# Patient Record
Sex: MALE | Race: WHITE | Employment: UNEMPLOYED | URBAN - METROPOLITAN AREA
[De-identification: names, ages, dates, MRNs, and addresses within clinical notes are randomized per-mention and may not be internally consistent; named-entity substitution may affect disease eponyms.]

---

## 2019-09-11 RX ORDER — ESCITALOPRAM OXALATE 10 MG/1
10 TABLET ORAL EVERY MORNING
COMMUNITY

## 2019-09-11 RX ORDER — LISINOPRIL AND HYDROCHLOROTHIAZIDE 12.5; 2 MG/1; MG/1
1 TABLET ORAL 2 TIMES DAILY
COMMUNITY

## 2019-09-11 RX ORDER — PROCHLORPERAZINE MALEATE 10 MG
10 TABLET ORAL EVERY 6 HOURS PRN
Status: ON HOLD | COMMUNITY
End: 2019-09-12

## 2019-09-11 RX ORDER — ONDANSETRON 8 MG/1
8 TABLET, ORALLY DISINTEGRATING ORAL EVERY 8 HOURS PRN
COMMUNITY

## 2019-09-11 RX ORDER — HYDROXYCHLOROQUINE SULFATE 200 MG/1
200 TABLET, FILM COATED ORAL 2 TIMES DAILY
Status: ON HOLD | COMMUNITY
End: 2019-09-12

## 2019-09-11 RX ORDER — BUPROPION HYDROCHLORIDE 75 MG/1
75 TABLET ORAL 2 TIMES DAILY
COMMUNITY

## 2019-09-11 RX ORDER — LISINOPRIL 20 MG/1
20 TABLET ORAL DAILY
Status: ON HOLD | COMMUNITY
End: 2019-09-12

## 2019-09-11 RX ORDER — PREGABALIN 200 MG/1
200 CAPSULE ORAL 3 TIMES DAILY
Status: ON HOLD | COMMUNITY
End: 2019-09-12

## 2019-09-11 RX ORDER — ALBUTEROL SULFATE 90 UG/1
2 AEROSOL, METERED RESPIRATORY (INHALATION) 4 TIMES DAILY PRN
COMMUNITY

## 2019-09-12 ENCOUNTER — ANESTHESIA EVENT (OUTPATIENT)
Dept: SURGERY | Facility: CLINIC | Age: 49
DRG: 040 | End: 2019-09-12
Payer: OTHER MISCELLANEOUS

## 2019-09-12 ENCOUNTER — TRANSFERRED RECORDS (OUTPATIENT)
Dept: HEALTH INFORMATION MANAGEMENT | Facility: CLINIC | Age: 49
End: 2019-09-12

## 2019-09-12 ENCOUNTER — HOSPITAL ENCOUNTER (INPATIENT)
Facility: CLINIC | Age: 49
LOS: 14 days | Discharge: HOME HEALTH | DRG: 040 | End: 2019-09-26
Attending: ORTHOPAEDIC SURGERY | Admitting: ORTHOPAEDIC SURGERY
Payer: OTHER MISCELLANEOUS

## 2019-09-12 ENCOUNTER — ANESTHESIA (OUTPATIENT)
Dept: SURGERY | Facility: CLINIC | Age: 49
DRG: 040 | End: 2019-09-12
Payer: OTHER MISCELLANEOUS

## 2019-09-12 ENCOUNTER — APPOINTMENT (OUTPATIENT)
Dept: GENERAL RADIOLOGY | Facility: CLINIC | Age: 49
DRG: 040 | End: 2019-09-12
Attending: ORTHOPAEDIC SURGERY
Payer: OTHER MISCELLANEOUS

## 2019-09-12 DIAGNOSIS — D86.9 SARCOIDOSIS: ICD-10-CM

## 2019-09-12 DIAGNOSIS — B99.9 INFECTION: ICD-10-CM

## 2019-09-12 LAB
CREAT SERPL-MCNC: 0.96 MG/DL (ref 0.66–1.25)
GFR SERPL CREATININE-BSD FRML MDRD: >90 ML/MIN/{1.73_M2}
HGB BLD-MCNC: 15 G/DL (ref 13.3–17.7)
POTASSIUM SERPL-SCNC: 3.9 MMOL/L (ref 3.4–5.3)

## 2019-09-12 PROCEDURE — 25000128 H RX IP 250 OP 636: Performed by: ORTHOPAEDIC SURGERY

## 2019-09-12 PROCEDURE — 36000056 ZZH SURGERY LEVEL 3 1ST 30 MIN: Performed by: ORTHOPAEDIC SURGERY

## 2019-09-12 PROCEDURE — 71000012 ZZH RECOVERY PHASE 1 LEVEL 1 FIRST HR: Performed by: ORTHOPAEDIC SURGERY

## 2019-09-12 PROCEDURE — 36415 COLL VENOUS BLD VENIPUNCTURE: CPT | Performed by: ANESTHESIOLOGY

## 2019-09-12 PROCEDURE — 25000125 ZZHC RX 250: Performed by: NURSE ANESTHETIST, CERTIFIED REGISTERED

## 2019-09-12 PROCEDURE — 85018 HEMOGLOBIN: CPT | Performed by: ANESTHESIOLOGY

## 2019-09-12 PROCEDURE — 88307 TISSUE EXAM BY PATHOLOGIST: CPT | Mod: 26 | Performed by: ORTHOPAEDIC SURGERY

## 2019-09-12 PROCEDURE — 25000128 H RX IP 250 OP 636: Performed by: ANESTHESIOLOGY

## 2019-09-12 PROCEDURE — 25800030 ZZH RX IP 258 OP 636: Performed by: PHYSICIAN ASSISTANT

## 2019-09-12 PROCEDURE — 25800030 ZZH RX IP 258 OP 636: Performed by: NURSE ANESTHETIST, CERTIFIED REGISTERED

## 2019-09-12 PROCEDURE — 27210794 ZZH OR GENERAL SUPPLY STERILE: Performed by: ORTHOPAEDIC SURGERY

## 2019-09-12 PROCEDURE — 25000566 ZZH SEVOFLURANE, EA 15 MIN: Performed by: ORTHOPAEDIC SURGERY

## 2019-09-12 PROCEDURE — 37000009 ZZH ANESTHESIA TECHNICAL FEE, EACH ADDTL 15 MIN: Performed by: ORTHOPAEDIC SURGERY

## 2019-09-12 PROCEDURE — 25000125 ZZHC RX 250: Performed by: ORTHOPAEDIC SURGERY

## 2019-09-12 PROCEDURE — 0Y6J0Z3 DETACHMENT AT LEFT LOWER LEG, LOW, OPEN APPROACH: ICD-10-PCS | Performed by: ORTHOPAEDIC SURGERY

## 2019-09-12 PROCEDURE — 25000128 H RX IP 250 OP 636: Performed by: NURSE ANESTHETIST, CERTIFIED REGISTERED

## 2019-09-12 PROCEDURE — 37000008 ZZH ANESTHESIA TECHNICAL FEE, 1ST 30 MIN: Performed by: ORTHOPAEDIC SURGERY

## 2019-09-12 PROCEDURE — 12000000 ZZH R&B MED SURG/OB

## 2019-09-12 PROCEDURE — 84132 ASSAY OF SERUM POTASSIUM: CPT | Performed by: ANESTHESIOLOGY

## 2019-09-12 PROCEDURE — 25000128 H RX IP 250 OP 636: Performed by: PHYSICIAN ASSISTANT

## 2019-09-12 PROCEDURE — 40000170 ZZH STATISTIC PRE-PROCEDURE ASSESSMENT II: Performed by: ORTHOPAEDIC SURGERY

## 2019-09-12 PROCEDURE — 25800030 ZZH RX IP 258 OP 636: Performed by: ANESTHESIOLOGY

## 2019-09-12 PROCEDURE — 82565 ASSAY OF CREATININE: CPT | Performed by: ANESTHESIOLOGY

## 2019-09-12 PROCEDURE — 25000132 ZZH RX MED GY IP 250 OP 250 PS 637: Performed by: ANESTHESIOLOGY

## 2019-09-12 PROCEDURE — 88307 TISSUE EXAM BY PATHOLOGIST: CPT | Performed by: ORTHOPAEDIC SURGERY

## 2019-09-12 PROCEDURE — 36000058 ZZH SURGERY LEVEL 3 EA 15 ADDTL MIN: Performed by: ORTHOPAEDIC SURGERY

## 2019-09-12 PROCEDURE — 25000132 ZZH RX MED GY IP 250 OP 250 PS 637: Performed by: PHYSICIAN ASSISTANT

## 2019-09-12 PROCEDURE — 40000985 XR KNEE PORT LT 1/2 VW: Mod: LT

## 2019-09-12 RX ORDER — FENTANYL CITRATE 50 UG/ML
50-100 INJECTION, SOLUTION INTRAMUSCULAR; INTRAVENOUS
Status: DISCONTINUED | OUTPATIENT
Start: 2019-09-12 | End: 2019-09-12 | Stop reason: HOSPADM

## 2019-09-12 RX ORDER — PRAVASTATIN SODIUM 10 MG
10 TABLET ORAL EVERY EVENING
Status: DISCONTINUED | OUTPATIENT
Start: 2019-09-12 | End: 2019-09-16

## 2019-09-12 RX ORDER — HYDROMORPHONE HYDROCHLORIDE 1 MG/ML
.3-.5 INJECTION, SOLUTION INTRAMUSCULAR; INTRAVENOUS; SUBCUTANEOUS
Status: DISCONTINUED | OUTPATIENT
Start: 2019-09-12 | End: 2019-09-26 | Stop reason: HOSPADM

## 2019-09-12 RX ORDER — CEFAZOLIN SODIUM 1 G/3ML
1 INJECTION, POWDER, FOR SOLUTION INTRAMUSCULAR; INTRAVENOUS EVERY 8 HOURS
Status: DISCONTINUED | OUTPATIENT
Start: 2019-09-12 | End: 2019-09-12

## 2019-09-12 RX ORDER — ESCITALOPRAM OXALATE 10 MG/1
10 TABLET ORAL EVERY MORNING
Status: DISCONTINUED | OUTPATIENT
Start: 2019-09-13 | End: 2019-09-16

## 2019-09-12 RX ORDER — HYDROXYZINE HYDROCHLORIDE 25 MG/1
25 TABLET, FILM COATED ORAL EVERY 6 HOURS PRN
Status: DISCONTINUED | OUTPATIENT
Start: 2019-09-12 | End: 2019-09-13

## 2019-09-12 RX ORDER — PROCHLORPERAZINE MALEATE 10 MG
10 TABLET ORAL EVERY 6 HOURS PRN
Status: DISCONTINUED | OUTPATIENT
Start: 2019-09-12 | End: 2019-09-26 | Stop reason: HOSPADM

## 2019-09-12 RX ORDER — LIDOCAINE 40 MG/G
CREAM TOPICAL
Status: DISCONTINUED | OUTPATIENT
Start: 2019-09-12 | End: 2019-09-17

## 2019-09-12 RX ORDER — FENTANYL CITRATE 50 UG/ML
25-50 INJECTION, SOLUTION INTRAMUSCULAR; INTRAVENOUS
Status: DISCONTINUED | OUTPATIENT
Start: 2019-09-12 | End: 2019-09-12 | Stop reason: HOSPADM

## 2019-09-12 RX ORDER — NALOXONE HYDROCHLORIDE 0.4 MG/ML
.1-.4 INJECTION, SOLUTION INTRAMUSCULAR; INTRAVENOUS; SUBCUTANEOUS
Status: DISCONTINUED | OUTPATIENT
Start: 2019-09-12 | End: 2019-09-16

## 2019-09-12 RX ORDER — ACETAMINOPHEN 325 MG/1
650 TABLET ORAL EVERY 4 HOURS PRN
Status: DISCONTINUED | OUTPATIENT
Start: 2019-09-15 | End: 2019-09-16

## 2019-09-12 RX ORDER — BUPROPION HYDROCHLORIDE 75 MG/1
75 TABLET ORAL 2 TIMES DAILY
Status: DISCONTINUED | OUTPATIENT
Start: 2019-09-12 | End: 2019-09-16

## 2019-09-12 RX ORDER — ONDANSETRON 2 MG/ML
INJECTION INTRAMUSCULAR; INTRAVENOUS PRN
Status: DISCONTINUED | OUTPATIENT
Start: 2019-09-12 | End: 2019-09-12

## 2019-09-12 RX ORDER — CEFAZOLIN SODIUM 2 G/100ML
2 INJECTION, SOLUTION INTRAVENOUS EVERY 8 HOURS
Status: COMPLETED | OUTPATIENT
Start: 2019-09-12 | End: 2019-09-13

## 2019-09-12 RX ORDER — ALBUTEROL SULFATE 90 UG/1
2 AEROSOL, METERED RESPIRATORY (INHALATION) 4 TIMES DAILY PRN
Status: DISCONTINUED | OUTPATIENT
Start: 2019-09-12 | End: 2019-09-26 | Stop reason: HOSPADM

## 2019-09-12 RX ORDER — METOCLOPRAMIDE HYDROCHLORIDE 5 MG/ML
10 INJECTION INTRAMUSCULAR; INTRAVENOUS EVERY 6 HOURS PRN
Status: DISCONTINUED | OUTPATIENT
Start: 2019-09-12 | End: 2019-09-26 | Stop reason: HOSPADM

## 2019-09-12 RX ORDER — KETAMINE HYDROCHLORIDE 10 MG/ML
INJECTION, SOLUTION INTRAMUSCULAR; INTRAVENOUS PRN
Status: DISCONTINUED | OUTPATIENT
Start: 2019-09-12 | End: 2019-09-12

## 2019-09-12 RX ORDER — ONDANSETRON 2 MG/ML
4 INJECTION INTRAMUSCULAR; INTRAVENOUS EVERY 6 HOURS PRN
Status: DISCONTINUED | OUTPATIENT
Start: 2019-09-12 | End: 2019-09-26 | Stop reason: HOSPADM

## 2019-09-12 RX ORDER — DEXAMETHASONE SODIUM PHOSPHATE 4 MG/ML
INJECTION, SOLUTION INTRA-ARTICULAR; INTRALESIONAL; INTRAMUSCULAR; INTRAVENOUS; SOFT TISSUE PRN
Status: DISCONTINUED | OUTPATIENT
Start: 2019-09-12 | End: 2019-09-12

## 2019-09-12 RX ORDER — OXYCODONE HYDROCHLORIDE 5 MG/1
5-10 TABLET ORAL
Status: DISCONTINUED | OUTPATIENT
Start: 2019-09-12 | End: 2019-09-13

## 2019-09-12 RX ORDER — AMOXICILLIN 250 MG
2 CAPSULE ORAL 2 TIMES DAILY
Status: DISCONTINUED | OUTPATIENT
Start: 2019-09-12 | End: 2019-09-16

## 2019-09-12 RX ORDER — ACETAMINOPHEN 325 MG/1
975 TABLET ORAL EVERY 8 HOURS
Status: COMPLETED | OUTPATIENT
Start: 2019-09-12 | End: 2019-09-15

## 2019-09-12 RX ORDER — ASPIRIN 325 MG
325 TABLET ORAL DAILY
Status: DISCONTINUED | OUTPATIENT
Start: 2019-09-13 | End: 2019-09-16

## 2019-09-12 RX ORDER — PROPOFOL 10 MG/ML
INJECTION, EMULSION INTRAVENOUS PRN
Status: DISCONTINUED | OUTPATIENT
Start: 2019-09-12 | End: 2019-09-12

## 2019-09-12 RX ORDER — LISINOPRIL AND HYDROCHLOROTHIAZIDE 12.5; 2 MG/1; MG/1
1 TABLET ORAL 2 TIMES DAILY
Status: DISCONTINUED | OUTPATIENT
Start: 2019-09-12 | End: 2019-09-16

## 2019-09-12 RX ORDER — NALOXONE HYDROCHLORIDE 0.4 MG/ML
.1-.4 INJECTION, SOLUTION INTRAMUSCULAR; INTRAVENOUS; SUBCUTANEOUS
Status: DISCONTINUED | OUTPATIENT
Start: 2019-09-12 | End: 2019-09-12

## 2019-09-12 RX ORDER — METOCLOPRAMIDE 5 MG/1
10 TABLET ORAL EVERY 6 HOURS PRN
Status: DISCONTINUED | OUTPATIENT
Start: 2019-09-12 | End: 2019-09-26 | Stop reason: HOSPADM

## 2019-09-12 RX ORDER — HYDROMORPHONE HYDROCHLORIDE 1 MG/ML
.3-.5 INJECTION, SOLUTION INTRAMUSCULAR; INTRAVENOUS; SUBCUTANEOUS EVERY 5 MIN PRN
Status: DISCONTINUED | OUTPATIENT
Start: 2019-09-12 | End: 2019-09-12 | Stop reason: HOSPADM

## 2019-09-12 RX ORDER — ASCORBIC ACID 500 MG
500 TABLET ORAL 2 TIMES DAILY
Status: DISCONTINUED | OUTPATIENT
Start: 2019-09-12 | End: 2019-09-16

## 2019-09-12 RX ORDER — ONDANSETRON 4 MG/1
4 TABLET, ORALLY DISINTEGRATING ORAL EVERY 30 MIN PRN
Status: DISCONTINUED | OUTPATIENT
Start: 2019-09-12 | End: 2019-09-12 | Stop reason: HOSPADM

## 2019-09-12 RX ORDER — CEFAZOLIN SODIUM IN 0.9 % NACL 3 G/100 ML
INTRAVENOUS SOLUTION, PIGGYBACK (ML) INTRAVENOUS PRN
Status: DISCONTINUED | OUTPATIENT
Start: 2019-09-12 | End: 2019-09-12

## 2019-09-12 RX ORDER — ONDANSETRON 2 MG/ML
4 INJECTION INTRAMUSCULAR; INTRAVENOUS EVERY 30 MIN PRN
Status: DISCONTINUED | OUTPATIENT
Start: 2019-09-12 | End: 2019-09-12 | Stop reason: HOSPADM

## 2019-09-12 RX ORDER — BUPIVACAINE HYDROCHLORIDE 5 MG/ML
INJECTION, SOLUTION PERINEURAL PRN
Status: DISCONTINUED | OUTPATIENT
Start: 2019-09-12 | End: 2019-09-12 | Stop reason: HOSPADM

## 2019-09-12 RX ORDER — GABAPENTIN 300 MG/1
300 CAPSULE ORAL 3 TIMES DAILY
Status: COMPLETED | OUTPATIENT
Start: 2019-09-12 | End: 2019-09-15

## 2019-09-12 RX ORDER — ONDANSETRON 4 MG/1
4 TABLET, ORALLY DISINTEGRATING ORAL EVERY 6 HOURS PRN
Status: DISCONTINUED | OUTPATIENT
Start: 2019-09-12 | End: 2019-09-26 | Stop reason: HOSPADM

## 2019-09-12 RX ORDER — ACETAMINOPHEN 325 MG/1
975 TABLET ORAL ONCE
Status: COMPLETED | OUTPATIENT
Start: 2019-09-12 | End: 2019-09-12

## 2019-09-12 RX ORDER — CEFAZOLIN SODIUM 2 G/100ML
2 INJECTION, SOLUTION INTRAVENOUS
Status: DISCONTINUED | OUTPATIENT
Start: 2019-09-12 | End: 2019-09-12

## 2019-09-12 RX ORDER — LAMOTRIGINE 100 MG/1
100 TABLET ORAL 2 TIMES DAILY
Status: DISCONTINUED | OUTPATIENT
Start: 2019-09-12 | End: 2019-09-16

## 2019-09-12 RX ORDER — MULTIVIT WITH MINERALS/LUTEIN
1000 TABLET ORAL DAILY
COMMUNITY

## 2019-09-12 RX ORDER — KETOROLAC TROMETHAMINE 30 MG/ML
30 INJECTION, SOLUTION INTRAMUSCULAR; INTRAVENOUS EVERY 6 HOURS PRN
Status: DISCONTINUED | OUTPATIENT
Start: 2019-09-12 | End: 2019-09-16

## 2019-09-12 RX ORDER — CEFAZOLIN SODIUM IN 0.9 % NACL 3 G/100 ML
3 INTRAVENOUS SOLUTION, PIGGYBACK (ML) INTRAVENOUS
Status: COMPLETED | OUTPATIENT
Start: 2019-09-12 | End: 2019-09-12

## 2019-09-12 RX ORDER — SODIUM CHLORIDE, SODIUM LACTATE, POTASSIUM CHLORIDE, CALCIUM CHLORIDE 600; 310; 30; 20 MG/100ML; MG/100ML; MG/100ML; MG/100ML
INJECTION, SOLUTION INTRAVENOUS CONTINUOUS
Status: DISCONTINUED | OUTPATIENT
Start: 2019-09-12 | End: 2019-09-12 | Stop reason: HOSPADM

## 2019-09-12 RX ORDER — SODIUM CHLORIDE 9 MG/ML
INJECTION, SOLUTION INTRAVENOUS CONTINUOUS
Status: DISCONTINUED | OUTPATIENT
Start: 2019-09-12 | End: 2019-09-16

## 2019-09-12 RX ORDER — AMOXICILLIN 250 MG
1 CAPSULE ORAL 2 TIMES DAILY
Status: DISCONTINUED | OUTPATIENT
Start: 2019-09-12 | End: 2019-09-16

## 2019-09-12 RX ORDER — SUMATRIPTAN 20 MG/1
1 SPRAY NASAL
Status: DISCONTINUED | OUTPATIENT
Start: 2019-09-12 | End: 2019-09-16

## 2019-09-12 RX ORDER — MAGNESIUM HYDROXIDE 1200 MG/15ML
LIQUID ORAL PRN
Status: DISCONTINUED | OUTPATIENT
Start: 2019-09-12 | End: 2019-09-12 | Stop reason: HOSPADM

## 2019-09-12 RX ORDER — CEFAZOLIN SODIUM 1 G/3ML
1 INJECTION, POWDER, FOR SOLUTION INTRAMUSCULAR; INTRAVENOUS SEE ADMIN INSTRUCTIONS
Status: DISCONTINUED | OUTPATIENT
Start: 2019-09-12 | End: 2019-09-12 | Stop reason: HOSPADM

## 2019-09-12 RX ADMIN — PHENYLEPHRINE HYDROCHLORIDE 100 MCG: 10 INJECTION INTRAVENOUS at 12:31

## 2019-09-12 RX ADMIN — MIDAZOLAM 2 MG: 1 INJECTION INTRAMUSCULAR; INTRAVENOUS at 11:40

## 2019-09-12 RX ADMIN — Medication 3 G: at 11:51

## 2019-09-12 RX ADMIN — HYDROMORPHONE HYDROCHLORIDE 0.5 MG: 1 INJECTION, SOLUTION INTRAMUSCULAR; INTRAVENOUS; SUBCUTANEOUS at 22:34

## 2019-09-12 RX ADMIN — PHENYLEPHRINE HYDROCHLORIDE 200 MCG: 10 INJECTION INTRAVENOUS at 12:45

## 2019-09-12 RX ADMIN — GABAPENTIN 300 MG: 300 CAPSULE ORAL at 16:04

## 2019-09-12 RX ADMIN — Medication 3 G: at 11:24

## 2019-09-12 RX ADMIN — HYDROXYZINE HYDROCHLORIDE 25 MG: 25 TABLET ORAL at 16:04

## 2019-09-12 RX ADMIN — SODIUM CHLORIDE: 9 INJECTION, SOLUTION INTRAVENOUS at 16:04

## 2019-09-12 RX ADMIN — OXYCODONE HYDROCHLORIDE 5 MG: 5 TABLET ORAL at 17:07

## 2019-09-12 RX ADMIN — ACETAMINOPHEN 975 MG: 325 TABLET, FILM COATED ORAL at 10:55

## 2019-09-12 RX ADMIN — DEXMEDETOMIDINE HYDROCHLORIDE 20 MCG: 100 INJECTION, SOLUTION INTRAVENOUS at 11:59

## 2019-09-12 RX ADMIN — PROPOFOL 100 MG: 10 INJECTION, EMULSION INTRAVENOUS at 11:46

## 2019-09-12 RX ADMIN — MILNACIPRAN HYDROCHLORIDE 50 MG: 50 TABLET, FILM COATED ORAL at 20:34

## 2019-09-12 RX ADMIN — PRAVASTATIN SODIUM 10 MG: 10 TABLET ORAL at 19:34

## 2019-09-12 RX ADMIN — LAMOTRIGINE 100 MG: 100 TABLET ORAL at 20:34

## 2019-09-12 RX ADMIN — OXYCODONE HYDROCHLORIDE 5 MG: 5 TABLET ORAL at 18:10

## 2019-09-12 RX ADMIN — MIDAZOLAM HYDROCHLORIDE 4 MG: 1 INJECTION, SOLUTION INTRAMUSCULAR; INTRAVENOUS at 10:55

## 2019-09-12 RX ADMIN — HYDROXYZINE HYDROCHLORIDE 25 MG: 25 TABLET ORAL at 22:34

## 2019-09-12 RX ADMIN — DEXMEDETOMIDINE HYDROCHLORIDE 20 MCG: 100 INJECTION, SOLUTION INTRAVENOUS at 11:52

## 2019-09-12 RX ADMIN — CEFAZOLIN SODIUM 2 G: 2 INJECTION, SOLUTION INTRAVENOUS at 21:22

## 2019-09-12 RX ADMIN — GABAPENTIN 300 MG: 300 CAPSULE ORAL at 21:21

## 2019-09-12 RX ADMIN — PHENYLEPHRINE HYDROCHLORIDE 200 MCG: 10 INJECTION INTRAVENOUS at 12:42

## 2019-09-12 RX ADMIN — SENNOSIDES AND DOCUSATE SODIUM 2 TABLET: 8.6; 5 TABLET ORAL at 20:34

## 2019-09-12 RX ADMIN — PHENYLEPHRINE HYDROCHLORIDE 200 MCG: 10 INJECTION INTRAVENOUS at 13:00

## 2019-09-12 RX ADMIN — LISINOPRIL AND HYDROCHLOROTHIAZIDE 1 TABLET: 12.5; 2 TABLET ORAL at 20:34

## 2019-09-12 RX ADMIN — PROPOFOL 200 MG: 10 INJECTION, EMULSION INTRAVENOUS at 11:43

## 2019-09-12 RX ADMIN — SODIUM CHLORIDE, POTASSIUM CHLORIDE, SODIUM LACTATE AND CALCIUM CHLORIDE: 600; 310; 30; 20 INJECTION, SOLUTION INTRAVENOUS at 11:23

## 2019-09-12 RX ADMIN — HYDROMORPHONE HYDROCHLORIDE 0.5 MG: 1 INJECTION, SOLUTION INTRAMUSCULAR; INTRAVENOUS; SUBCUTANEOUS at 19:34

## 2019-09-12 RX ADMIN — ONDANSETRON 4 MG: 2 INJECTION INTRAMUSCULAR; INTRAVENOUS at 13:10

## 2019-09-12 RX ADMIN — ACETAMINOPHEN 975 MG: 325 TABLET ORAL at 18:11

## 2019-09-12 RX ADMIN — SODIUM CHLORIDE, POTASSIUM CHLORIDE, SODIUM LACTATE AND CALCIUM CHLORIDE: 600; 310; 30; 20 INJECTION, SOLUTION INTRAVENOUS at 12:52

## 2019-09-12 RX ADMIN — OXYCODONE HYDROCHLORIDE AND ACETAMINOPHEN 500 MG: 500 TABLET ORAL at 20:34

## 2019-09-12 RX ADMIN — OXYCODONE HYDROCHLORIDE 10 MG: 5 TABLET ORAL at 20:34

## 2019-09-12 RX ADMIN — Medication 30 MG: at 11:43

## 2019-09-12 RX ADMIN — DEXAMETHASONE SODIUM PHOSPHATE 8 MG: 4 INJECTION, SOLUTION INTRA-ARTICULAR; INTRALESIONAL; INTRAMUSCULAR; INTRAVENOUS; SOFT TISSUE at 11:52

## 2019-09-12 RX ADMIN — Medication 20 MG: at 12:49

## 2019-09-12 RX ADMIN — PHENYLEPHRINE HYDROCHLORIDE 100 MCG: 10 INJECTION INTRAVENOUS at 12:33

## 2019-09-12 RX ADMIN — BUPROPION HYDROCHLORIDE 75 MG: 75 TABLET, FILM COATED ORAL at 20:34

## 2019-09-12 ASSESSMENT — ACTIVITIES OF DAILY LIVING (ADL)
ADLS_ACUITY_SCORE: 15
ADLS_ACUITY_SCORE: 15

## 2019-09-12 ASSESSMENT — MIFFLIN-ST. JEOR: SCORE: 2138.18

## 2019-09-12 NOTE — ANESTHESIA PROCEDURE NOTES
Peripheral nerve/Neuraxial procedure note : Adductor canal (targeting saphenous nerve)  Pre-Procedure  Performed by Cali Avendaño MD  Location: pre-op      Pre-Anesthestic Checklist: patient identified, IV checked, site marked, risks and benefits discussed, informed consent, monitors and equipment checked, pre-op evaluation, at physician/surgeon's request and post-op pain management    Timeout  Correct Patient: Yes   Correct Procedure: Yes   Correct Site: Yes   Correct Laterality: Yes   Correct Position: Yes   Site Marked: Yes   .   Procedure Documentation    .    Procedure:  left  Adductor canal (targeting saphenous nerve).  Local skin infiltrated with 1 mL of 1% lidocaine.     Ultrasound used to identify targeted nerve, plexus, or vascular marker and placed a needle adjacent to it., Ultrasound was used to visualize the spread of the anesthetic in close proximity to the above stated nerve. A permanent image is entered into the patient's record.  Patient Prep;chlorhexidine gluconate and isopropyl alcohol.  .  Needle: insulated Needle Gauge: 21.  Needle Length (millimeters) 100  Insertion Method: Single Shot.       Assessment/Narrative  Paresthesias: No.  .  The placement was negative for: blood aspirated, painful injection and site bleeding.  Bolus given via needle..   Secured via.   Complications: none. Comments:  Bolus via needle, 15 ml of 0.5% bupivacaine with 1:400,000 epinephrine.  Patient tolerated well, was mildly sedated but communicative throughout the procedure.   The surgeon has given a verbal order transferring care of this patient to me for the performance of regional analgesia block for post op pain control. It is requested of me because I am uniquely trained and qualified to perform this block and the surgeon is neither trained nor qualified to perform this procedure.

## 2019-09-12 NOTE — ANESTHESIA PREPROCEDURE EVALUATION
Anesthesia Pre-Procedure Evaluation    Patient: Juancho Mohan   MRN: [id number] : 1970          Preoperative Diagnosis: left chronic foot and ankle pain, complex regional pain syndrome    Procedure(s):  LEFT BELOW KNEE AMPUTATION    Past Medical History:   Diagnosis Date     Alcohol abuse, episodic      Attention deficit disorder with hyperactivity(314.01)      Congenital spondylolisthesis     L5-S1     Depressive disorder, not elsewhere classified      Dyspnea on exertion      Essential hypertension, benign      Gastro-oesophageal reflux disease      Generalized anxiety disorder      Migraine, unspecified, with intractable migraine, so stated, without mention of status migrainosus 05    acts like a stroke, hospitalized, saw neurology     Pneumonia, organism [phone] 08    Admit. Discharged 08-[facility]     Reflux esophagitis      Renal disease     kidney stone     RSD (reflex sympathetic dystrophy)      Sarcoidoses 12    EBUS- FNA. Unable to do special stains     Sleep apnea 2009    no c pap usage     Past Surgical History:   Procedure Laterality Date     ARTHRODESIS ANKLE  2013    Procedure: ARTHRODESIS ANKLE;  LEFT SAPHENOUS NERVE BLOCK, LEFT REVISION SUBTALAR FUSION, POSTERIOR BONE BLOCK, TENDOACHILLES LENGTHENING, PLANTAR CALCANEAL EXCOSTECTOMY, LEFT SURAL NEURECTOMY  (FEMORAL HEAD ALLOGRAFT, ALLOSTEM STRIPS, SYNTHES 6.5 MM HEADLESS COMPRESSION SCREWS) ;  Surgeon: Amauri Mccoy MD;  Location:  OR     ARTHROSCOPY ANKLE Left 7/15/2015    Procedure: ARTHROSCOPY ANKLE;  Surgeon: Amauri Mccoy MD;  Location:  OR     BRONCHOSCOPY FLEXIBLE  2012    Procedure: BRONCHOSCOPY FLEXIBLE;  Flexible Bronchoscopy, Endobronchial Ultrasound with Biopsies;  Surgeon: Justice Dos Santos MD;  Location: [facility] OR     C LUMBAR SPINE FUSION,ANTER APPRCH       C NONSPECIFIC PROCEDURE      Multiple ortho injuries after 38 foot fall -  Fx x 3 right foot, Tibial plateau fx - with ORIF; Left heel fx - plate/screws.     EXCISE EXOSTOSIS FOOT  12/31/2013    Procedure: EXCISE EXOSTOSIS FOOT;  PLANTAR CALCANEOUS EXOSTECOMY;  Surgeon: Amauri Mccoy MD;  Location:  OR     EXERCISE STRESS TEST NL  Jan 2010    normal     GRAFT BONE FROM ILIAC CREST  12/31/2013    Procedure: GRAFT BONE FROM ILIAC CREST;  BONE MARROW  ASPIRATION FROM RIGHTILIAC CREST;  Surgeon: Amauri Mccoy MD;  Location: [facility] OR     HC ARTHROTOMY/EXPLORE/TREAT KNEE JOINT      x3 left; x1 right     HC REMOVAL DEEP IMPLANT  6/16/2009    Left foot. Open wedge osteotomy through the calcaneus. Fusion of left calcaneocuboid joint.     HC REPAIR OF NASAL SEPTUM  12/02/08    [facility]     IRRIGATION AND DEBRIDEMENT FOOT, COMBINED Left 7/15/2015    Procedure: COMBINED IRRIGATION AND DEBRIDEMENT FOOT;  Surgeon: Amauri Mccoy MD;  Location:  OR     IRRIGATION AND DEBRIDEMENT LOWER EXTREMITY, COMBINED Left 7/10/2015    Procedure: COMBINED IRRIGATION AND DEBRIDEMENT LOWER EXTREMITY;  Surgeon: Kirt Godwin MD;  Location:  OR     LENGTHEN TENDON ACHILLES  12/31/2013    Procedure: LENGTHEN TENDON ACHILLES;;  Surgeon: Amauri Mccoy MD;  Location:  OR       Anesthesia Evaluation     .             ROS/MED HX    ENT/Pulmonary:     (+)sleep apnea, , . .    Neurologic:     (+)migraines,     Cardiovascular:     (+) hypertension----. : . . . :. .       METS/Exercise Tolerance:     Hematologic:         Musculoskeletal:         GI/Hepatic:     (+) GERD       Renal/Genitourinary:     (+) chronic renal disease,       Endo:     (+) Obesity, .      Psychiatric:     (+) psychiatric history anxiety and depression      Infectious Disease:         Malignancy:         Other:    (+) H/O Chronic Pain,H/O chronic opiod use ,                         Physical Exam      Airway   Mallampati: II  TM distance: >3 FB  Neck ROM: full    Dental     Cardiovascular        Pulmonary             Lab Results   Component Value Date    WBC 7.5 08/11/2015    HGB 15.0 09/12/2019    HCT 37.5 (L) 08/11/2015     08/11/2015    CRP 69.0 (H) 08/10/2015    SED 49 (H) 08/10/2015     08/05/2015    POTASSIUM 3.9 08/05/2015    CHLORIDE 103 08/05/2015    CO2 25 08/05/2015    BUN 14 08/10/2015    CR 1.15 08/10/2015    GLC 91 08/05/2015    QUE 9.3 08/05/2015    PHOS 4.4 01/01/2014    MAG 2.0 01/01/2014    ALBUMIN 3.0 (L) 08/01/2015    PROTTOTAL 8.0 08/01/2015    ALT 23 08/01/2015    AST 19 08/10/2015    GGT 32 12/19/2002    ALKPHOS 235 (H) 08/01/2015    BILITOTAL 0.3 08/01/2015    BILIDIRECT 0.1 12/20/2002    LIPASE 76 05/18/2012    AMYLASE 49 01/03/2010    INR 0.99 06/06/2012    TSH 3.36 06/06/2008    T4 0.78 06/06/2008       Preop Vitals  BP Readings from Last 3 Encounters:   08/11/15 142/84   08/01/15 (!) 177/99   07/18/15 124/88    Pulse Readings from Last 3 Encounters:   08/01/15 84   07/17/15 102   10/12/12 65      Resp Readings from Last 3 Encounters:   08/01/15 20   07/18/15 16   01/02/14 16    SpO2 Readings from Last 3 Encounters:   08/11/15 100%   08/01/15 98%   07/18/15 95%      Temp Readings from Last 1 Encounters:   08/01/15 37.6  C (99.6  F) (Oral)    Ht Readings from Last 1 Encounters:   09/12/19 1.829 m (6')      Wt Readings from Last 1 Encounters:   09/12/19 122 kg (269 lb)    Estimated body mass index is 36.48 kg/m  as calculated from the following:    Height as of this encounter: 1.829 m (6').    Weight as of this encounter: 122 kg (269 lb).       Anesthesia Plan      History & Physical Review  History and physical reviewed and following examination; no interval change.    ASA Status:  3 .    NPO Status:  > 8 hours    Plan for General, Periph. Nerve Block for postop pain and LMA with Propofol induction. Maintenance will be Balanced.    PONV prophylaxis:  Ondansetron (or other 5HT-3) and Dexamethasone or Solumedrol       Postoperative Care  Postoperative pain  management:  IV analgesics.      Consents  Anesthetic plan, risks, benefits and alternatives discussed with:  Patient..                 Cali Avendaño MD

## 2019-09-12 NOTE — ANESTHESIA POSTPROCEDURE EVALUATION
Patient: Juancho Mohan    Procedure(s):  LEFT BELOW KNEE AMPUTATION    Diagnosis:left chronic foot and ankle pain, complex regional pain syndrome  Diagnosis Additional Information: No value filed.    Anesthesia Type:  General, Periph. Nerve Block for postop pain, LMA    Note:  Anesthesia Post Evaluation    Patient location during evaluation: PACU  Patient participation: Able to fully participate in evaluation  Level of consciousness: awake and alert  Pain management: adequate  Airway patency: patent  Cardiovascular status: acceptable and hemodynamically stable  Respiratory status: acceptable  Hydration status: euvolemic  PONV: none     Anesthetic complications: None          Last vitals:  Vitals:    09/12/19 1545 09/12/19 1600 09/12/19 1615   BP: 123/85  (!) 135/94   Pulse:      Resp: 15 16 16   Temp:      SpO2: 96%  96%         Electronically Signed By: Cali Avendaño MD  September 12, 2019  4:42 PM

## 2019-09-12 NOTE — ANESTHESIA PROCEDURE NOTES
Peripheral nerve/Neuraxial procedure note : Popliteal  Pre-Procedure  Performed by Cali Avendaño MD  Location: pre-op      Pre-Anesthestic Checklist: patient identified, IV checked, site marked, risks and benefits discussed, informed consent, monitors and equipment checked, pre-op evaluation, at physician/surgeon's request and post-op pain management    Timeout  Correct Patient: Yes   Correct Procedure: Yes   Correct Site: Yes   Correct Laterality: Yes   Correct Position: Yes   Site Marked: Yes   .   Procedure Documentation    .    Procedure:  left  Popliteal.  Local skin infiltrated with 1 mL of 1% lidocaine.     Ultrasound used to identify targeted nerve, plexus, or vascular marker and placed a needle adjacent to it., Ultrasound was used to visualize the spread of the anesthetic in close proximity to the above stated nerve. A permanent image is entered into the patient's record.  Patient Prep;povidone-iodine 7.5% surgical scrub.  .  Needle: insulated Needle Gauge: 21.    Needle Length (Inches) 3.13  Insertion Method: Single Shot.       Assessment/Narrative  Paresthesias: No.  .  The placement was negative for: blood aspirated, painful injection and site bleeding.  Bolus given via needle. No blood aspirated via catheter.   Secured via.   Complications:. Comments:  Bolus via needle, 25 ml of 0.5% bupivacaine with 1:400,000 epinephrine.  Patient tolerated well, was mildly sedated but communicative throughout the procedure.   The surgeon has given a verbal order transferring care of this patient to me for the performance of regional analgesia block for post op pain control. It is requested of me because I am uniquely trained and qualified to perform this block and the surgeon is neither trained nor qualified to perform this procedure.

## 2019-09-12 NOTE — PROGRESS NOTES
Admission medication history interview status for the 9/12/2019  admission is complete. See EPIC admission navigator for prior to admission medications     Medication history source reliability:Moderate    Medication history interview source(s):Patient    Medication history resources (including written lists, pill bottles, clinic record): written list    Primary pharmacy. [facility]    Additional medication history information not noted on PTA med list :None    Time spent in this activity: 45 mins    Prior to Admission medications    Medication Sig Last Dose Taking? Auth Provider   acetaminophen (TYLENOL) 325 MG tablet Take 2 tablets (650 mg) by mouth every 4 hours as needed 8/22/2019 at prn Yes Amauri Mccoy MD   albuterol (PROAIR HFA/PROVENTIL HFA/VENTOLIN HFA) 108 (90 Base) MCG/ACT inhaler Inhale 2 puffs into the lungs 4 times daily as needed for shortness of breath / dyspnea or wheezing 9/10/2019 at prn Yes Reported, Patient   buPROPion (WELLBUTRIN) 75 MG tablet Take 75 mg by mouth 2 times daily 9/11/2019 at 2130 Yes Reported, Patient   escitalopram (LEXAPRO) 10 MG tablet Take 10 mg by mouth every morning 9/11/2019 at 2130 Yes Reported, Patient   fluticasone-vilanterol (BREO ELLIPTA) 100-25 MCG/INH inhaler Inhale 1 puff into the lungs daily 9/12/2019 at 0700 Yes Reported, Patient   IMITREX 20 MG/ACT nasal spray SPRAY 1 SPRAY IN NOSTRIL AS NEEDED FOR MIGRAINE (MAY REPEAT AFTER 2 HRS IF NEEDED, MAX 2 SPRAYS IN 24 HRS.). 8/29/2019 at prn Yes Trish Krishnan MD   lamoTRIgine (LAMICTAL) 200 MG tablet Take 100 mg by mouth 2 times daily  9/11/2019 at 2130 Yes Reported, Patient   lisinopril-hydrochlorothiazide (PRINZIDE/ZESTORETIC) 20-12.5 MG tablet Take 1 tablet by mouth 2 times daily 9/11/2019 at 2130 Yes Reported, Patient   milnacipran (SAVELLA) 50 MG TABS Take 50 mg by mouth 2 times daily. 9/11/2019 at 2130 Yes Reported, Patient   ondansetron (ZOFRAN-ODT) 8 MG ODT tab Take 8 mg by mouth  every 8 hours as needed for nausea 9/5/2019 at prn Yes Reported, Patient   pravastatin (PRAVACHOL) 10 MG tablet Take 10 mg by mouth every evening  9/11/2019 at 2130 Yes Unknown, Entered By History   vitamin C (ASCORBIC ACID) 1000 MG TABS Take 1,000 mg by mouth daily 9/11/2019 at 2130 Yes Reported, Patient   ORDER FOR DME Equipment being ordered: Walker Wheels (), Walker () and Crutches ()  Treatment Diagnosis: Decreased functional mobility   Amauri Mccoy MD   ORDER FOR DME Equipment being ordered: Walker Wheels (), Walker () and Crutches ()  Treatment Diagnosis: decreased functional mobility   Amauri Mccoy MD

## 2019-09-12 NOTE — ANESTHESIA CARE TRANSFER NOTE
Patient: Juancho Mohan    Procedure(s):  LEFT BELOW KNEE AMPUTATION    Diagnosis: left chronic foot and ankle pain, complex regional pain syndrome  Diagnosis Additional Information: No value filed.    Anesthesia Type:   General, Periph. Nerve Block for postop pain, LMA     Note:  Airway :Face Mask  Patient transferred to:PACU  Handoff Report: Identifed the Patient, Identified the Reponsible Provider, Reviewed the pertinent medical history, Discussed the surgical course, Reviewed Intra-OP anesthesia mangement and issues during anesthesia, Set expectations for post-procedure period and Allowed opportunity for questions and acknowledgement of understanding      Vitals: (Last set prior to Anesthesia Care Transfer)    CRNA VITALS  9/12/2019 1304 - 9/12/2019 1340      9/12/2019             SpO2:  94 %    Resp Rate (observed):      Resp Rate (set):  10                Electronically Signed By: JAVIER Brothers CRNA  September 12, 2019  1:40 PM

## 2019-09-12 NOTE — OP NOTE
PREOPERATIVE DIAGNOSIS: L chronic ankle and hindfoot pain, CRPS, s/p prior subtalar bone-block arthrodesis.    POSTOPERATIVE DIAGNOSIS: L chronic ankle and hindfoot pain, CRPS, s/p prior subtalar bone-block arthrodesis.    PROCEDURE(S): L below knee amputation.    ATTENDING SURGEON: Dr. Amauri Mccoy.    ASSISTANT SURGEON: Cali Tate PA-C.    ANESTHESIA: General w/ regional nerve block.    EBL: 25mL.    TOURNIQUET TIME: 30min.    SPECIMENS: L leg sent for permanent pathology.    COMPLICATIONS: None apparent.    A skilled surgical assistant, Cali Tate PA-C, was necessary and utilized during the case to assist with patient positioning, prepping and draping, completing the soft tissue dissection and bone cuts, wound closure, and dressing and splint application.  The assistant was utilized throughout the entire case.    INDICATIONS: Juancho is a pleasant 48 year-old gentleman well-known to my clinic after undergoing multiple procedures for his L foot.  The patient had sustained a calcaneus fracture in the past and ultimately developed chronic hindfoot pain, CRPS, and posttraumatic subtalar DJD.  The patient ultimately underwent a subtalar bone-block arthrodesis and subsequent hardware removal without substantial improvement in his chronic pain.  The patient has undergone extensive conservative treatment for his chronic pain with the [facility].  The benefits and risks of a below-knee amputation were reviewed in depth with the patient and the patient also underwent counseling with the prosthetics team.  The patient provided informed consent to proceed.    The patient was identified in the pre-operative holding area on the date of surgery.  The operative site was marked with indelible marker and the patient was brought back to the operating room and transferred to the operating table in a supine position.  All bony prominences were well-padded.  Anesthesia was administered without complication.  The left  lower extremity was prepped and draped in standard sterile fashion.  A pre-operative timeout was performed identifying the correct patient, procedure, operative site, antibiotic administration, and equipment necessary for the procedure.    Esmarch exsanguination was utilized proximal to the ankle and an upper thigh tourniquet was inflated.  A standard below-knee amputation incision was created leaving a generous posterior flap.  Soft tissue dissection was carefully carried down maintaining excellent hemostasis.  All four muscle compartments were identified and dissection was carried down through each of the muscle bellies with Bovie electrocautery to maintain hemostasis.  The saphenous, sural, and superficial peroneal nerves were identified and infiltrated with 0.5% Marcaine prior to transecting the nerves proximally within the proximal soft tissues.  Hemostasis was achieved of the associated vascular structures.  The anterior tibial artery and deep peroneal nerve were also identified.  The deep peroneal nerve was infiltrated with 0.5% Marcaine and then transected proximally within the soft tissues.  The anterior tibialis artery was cauterized and secured with a silk tie prior to transecting the artery.  An oscillating saw was utilized to transect the distal tibia and fibula.  An appropriate level of amputation was confirmed under fluoroscopic imaging.  A bevel was created across the anterior tibia to limit bony prominence at the end of the residual limb.  The amputation was then completed through the posterior compartments and the leg was sent off for permanent pathology.  The tibial nerve and posterior tibial artery were identified.  The tibial nerve was infiltrated with 0.5% Marcaine and then transected proximally within the soft tissues.  The posterior tibial artery was coagulated and secured with a silk tie prior to transecting the artery.  The tourniquet was released and excellent hemostasis was achieved.  The  wound was copiously irrigated.  Two drill holes were made through the tibial cortex and the deep fascia of the posterior compartment was secured to the tibia with #2 Ethibond suture.  The gastrocnemius fascia was reapproximated to the deep fascia of the anterior leg with interrupted 2-0 Vicryl suture.  Subcutaneous tissues and skin were reapproximated with interrupted 3-0 Monocryl and a running 3-0 nylon suture respectively, taking care to eliminate any dog ears along the ends of the incision.  Xeroform and sterile dressings were applied.  The patient was placed in a long leg splint maintaining the knee in full extension.  The patient was extubated and brought to the PACU in stable condition for further postoperative care.    Postoperatively the patient will remain strictly nonweightbearing on the left lower extremity.  The patient will be placed on ASA for DVT prophylaxis.  The patient will undergo a dressing change in approximately three days with conversion to a [employer] brace.  The patient will return to clinic for follow-up as an outpatient at three weeks postoperatively for wound check and suture removal.    Of note, all counts were correct at the conclusion of the case.

## 2019-09-13 ENCOUNTER — APPOINTMENT (OUTPATIENT)
Dept: PHYSICAL THERAPY | Facility: CLINIC | Age: 49
DRG: 040 | End: 2019-09-13
Attending: PHYSICIAN ASSISTANT
Payer: OTHER MISCELLANEOUS

## 2019-09-13 LAB — GLUCOSE SERPL-MCNC: 209 MG/DL (ref 70–99)

## 2019-09-13 PROCEDURE — 25000128 H RX IP 250 OP 636: Performed by: ORTHOPAEDIC SURGERY

## 2019-09-13 PROCEDURE — 97110 THERAPEUTIC EXERCISES: CPT | Mod: GP | Performed by: PHYSICAL THERAPIST

## 2019-09-13 PROCEDURE — 12000000 ZZH R&B MED SURG/OB

## 2019-09-13 PROCEDURE — 97116 GAIT TRAINING THERAPY: CPT | Mod: GP | Performed by: PHYSICAL THERAPIST

## 2019-09-13 PROCEDURE — 25000128 H RX IP 250 OP 636: Performed by: PHYSICIAN ASSISTANT

## 2019-09-13 PROCEDURE — 25000132 ZZH RX MED GY IP 250 OP 250 PS 637: Performed by: ORTHOPAEDIC SURGERY

## 2019-09-13 PROCEDURE — 36415 COLL VENOUS BLD VENIPUNCTURE: CPT | Performed by: ORTHOPAEDIC SURGERY

## 2019-09-13 PROCEDURE — 97530 THERAPEUTIC ACTIVITIES: CPT | Mod: GP | Performed by: PHYSICAL THERAPIST

## 2019-09-13 PROCEDURE — 82947 ASSAY GLUCOSE BLOOD QUANT: CPT | Performed by: ORTHOPAEDIC SURGERY

## 2019-09-13 PROCEDURE — 25000132 ZZH RX MED GY IP 250 OP 250 PS 637: Performed by: PHYSICIAN ASSISTANT

## 2019-09-13 PROCEDURE — 97161 PT EVAL LOW COMPLEX 20 MIN: CPT | Mod: GP | Performed by: PHYSICAL THERAPIST

## 2019-09-13 RX ORDER — DIPHENHYDRAMINE HCL 25 MG
25 CAPSULE ORAL EVERY 6 HOURS PRN
Status: DISCONTINUED | OUTPATIENT
Start: 2019-09-13 | End: 2019-09-26 | Stop reason: HOSPADM

## 2019-09-13 RX ORDER — CYCLOBENZAPRINE HCL 10 MG
10 TABLET ORAL 3 TIMES DAILY PRN
Status: DISCONTINUED | OUTPATIENT
Start: 2019-09-13 | End: 2019-09-26 | Stop reason: HOSPADM

## 2019-09-13 RX ORDER — HYDROMORPHONE HYDROCHLORIDE 2 MG/1
2-4 TABLET ORAL
Status: DISCONTINUED | OUTPATIENT
Start: 2019-09-13 | End: 2019-09-26 | Stop reason: HOSPADM

## 2019-09-13 RX ORDER — CYCLOBENZAPRINE HCL 10 MG
10 TABLET ORAL 3 TIMES DAILY
Status: DISCONTINUED | OUTPATIENT
Start: 2019-09-13 | End: 2019-09-13

## 2019-09-13 RX ADMIN — LAMOTRIGINE 100 MG: 100 TABLET ORAL at 19:54

## 2019-09-13 RX ADMIN — HYDROMORPHONE HYDROCHLORIDE 0.5 MG: 1 INJECTION, SOLUTION INTRAMUSCULAR; INTRAVENOUS; SUBCUTANEOUS at 07:14

## 2019-09-13 RX ADMIN — PRAVASTATIN SODIUM 10 MG: 10 TABLET ORAL at 19:55

## 2019-09-13 RX ADMIN — KETOROLAC TROMETHAMINE 30 MG: 30 INJECTION, SOLUTION INTRAMUSCULAR at 19:59

## 2019-09-13 RX ADMIN — OXYCODONE HYDROCHLORIDE 10 MG: 5 TABLET ORAL at 00:13

## 2019-09-13 RX ADMIN — LISINOPRIL AND HYDROCHLOROTHIAZIDE 1 TABLET: 12.5; 2 TABLET ORAL at 19:54

## 2019-09-13 RX ADMIN — BUPROPION HYDROCHLORIDE 75 MG: 75 TABLET, FILM COATED ORAL at 08:13

## 2019-09-13 RX ADMIN — HYDROMORPHONE HYDROCHLORIDE 4 MG: 2 TABLET ORAL at 11:24

## 2019-09-13 RX ADMIN — GABAPENTIN 300 MG: 300 CAPSULE ORAL at 08:13

## 2019-09-13 RX ADMIN — ASPIRIN 325 MG ORAL TABLET 325 MG: 325 PILL ORAL at 08:13

## 2019-09-13 RX ADMIN — BUPROPION HYDROCHLORIDE 75 MG: 75 TABLET, FILM COATED ORAL at 19:55

## 2019-09-13 RX ADMIN — HYDROMORPHONE HYDROCHLORIDE 4 MG: 2 TABLET ORAL at 17:44

## 2019-09-13 RX ADMIN — HYDROMORPHONE HYDROCHLORIDE 0.5 MG: 1 INJECTION, SOLUTION INTRAMUSCULAR; INTRAVENOUS; SUBCUTANEOUS at 01:14

## 2019-09-13 RX ADMIN — HYDROMORPHONE HYDROCHLORIDE 0.5 MG: 1 INJECTION, SOLUTION INTRAMUSCULAR; INTRAVENOUS; SUBCUTANEOUS at 03:19

## 2019-09-13 RX ADMIN — CYCLOBENZAPRINE HYDROCHLORIDE 10 MG: 10 TABLET, FILM COATED ORAL at 22:56

## 2019-09-13 RX ADMIN — ACETAMINOPHEN 975 MG: 325 TABLET ORAL at 02:46

## 2019-09-13 RX ADMIN — SENNOSIDES AND DOCUSATE SODIUM 1 TABLET: 8.6; 5 TABLET ORAL at 19:54

## 2019-09-13 RX ADMIN — CYCLOBENZAPRINE HYDROCHLORIDE 10 MG: 10 TABLET, FILM COATED ORAL at 06:09

## 2019-09-13 RX ADMIN — HYDROMORPHONE HYDROCHLORIDE 4 MG: 2 TABLET ORAL at 04:38

## 2019-09-13 RX ADMIN — KETOROLAC TROMETHAMINE 30 MG: 30 INJECTION, SOLUTION INTRAMUSCULAR at 00:13

## 2019-09-13 RX ADMIN — SENNOSIDES AND DOCUSATE SODIUM 1 TABLET: 8.6; 5 TABLET ORAL at 08:13

## 2019-09-13 RX ADMIN — ACETAMINOPHEN 975 MG: 325 TABLET ORAL at 11:24

## 2019-09-13 RX ADMIN — OXYCODONE HYDROCHLORIDE AND ACETAMINOPHEN 500 MG: 500 TABLET ORAL at 19:54

## 2019-09-13 RX ADMIN — HYDROMORPHONE HYDROCHLORIDE 4 MG: 2 TABLET ORAL at 08:13

## 2019-09-13 RX ADMIN — OXYCODONE HYDROCHLORIDE AND ACETAMINOPHEN 500 MG: 500 TABLET ORAL at 08:13

## 2019-09-13 RX ADMIN — GABAPENTIN 300 MG: 300 CAPSULE ORAL at 19:54

## 2019-09-13 RX ADMIN — HYDROMORPHONE HYDROCHLORIDE 4 MG: 2 TABLET ORAL at 14:24

## 2019-09-13 RX ADMIN — HYDROMORPHONE HYDROCHLORIDE 4 MG: 2 TABLET ORAL at 21:13

## 2019-09-13 RX ADMIN — DIPHENHYDRAMINE HYDROCHLORIDE 25 MG: 25 CAPSULE ORAL at 21:13

## 2019-09-13 RX ADMIN — MILNACIPRAN HYDROCHLORIDE 50 MG: 50 TABLET, FILM COATED ORAL at 19:53

## 2019-09-13 RX ADMIN — LISINOPRIL AND HYDROCHLOROTHIAZIDE 1 TABLET: 12.5; 2 TABLET ORAL at 08:13

## 2019-09-13 RX ADMIN — HYDROMORPHONE HYDROCHLORIDE 0.5 MG: 1 INJECTION, SOLUTION INTRAMUSCULAR; INTRAVENOUS; SUBCUTANEOUS at 19:59

## 2019-09-13 RX ADMIN — LAMOTRIGINE 100 MG: 100 TABLET ORAL at 08:14

## 2019-09-13 RX ADMIN — KETOROLAC TROMETHAMINE 30 MG: 30 INJECTION, SOLUTION INTRAMUSCULAR at 12:16

## 2019-09-13 RX ADMIN — CEFAZOLIN SODIUM 2 G: 2 INJECTION, SOLUTION INTRAVENOUS at 04:40

## 2019-09-13 RX ADMIN — FLUTICASONE FUROATE AND VILANTEROL TRIFENATATE 1 PUFF: 100; 25 POWDER RESPIRATORY (INHALATION) at 08:17

## 2019-09-13 RX ADMIN — CYCLOBENZAPRINE HYDROCHLORIDE 10 MG: 10 TABLET, FILM COATED ORAL at 14:24

## 2019-09-13 RX ADMIN — ACETAMINOPHEN 975 MG: 325 TABLET ORAL at 19:55

## 2019-09-13 RX ADMIN — ESCITALOPRAM OXALATE 10 MG: 10 TABLET ORAL at 08:13

## 2019-09-13 RX ADMIN — HYDROMORPHONE HYDROCHLORIDE 0.5 MG: 1 INJECTION, SOLUTION INTRAMUSCULAR; INTRAVENOUS; SUBCUTANEOUS at 13:23

## 2019-09-13 RX ADMIN — KETOROLAC TROMETHAMINE 30 MG: 30 INJECTION, SOLUTION INTRAMUSCULAR at 06:09

## 2019-09-13 RX ADMIN — GABAPENTIN 300 MG: 300 CAPSULE ORAL at 16:27

## 2019-09-13 RX ADMIN — MILNACIPRAN HYDROCHLORIDE 50 MG: 50 TABLET, FILM COATED ORAL at 08:13

## 2019-09-13 ASSESSMENT — ACTIVITIES OF DAILY LIVING (ADL)
ADLS_ACUITY_SCORE: 15
ADLS_ACUITY_SCORE: 17

## 2019-09-13 NOTE — PROGRESS NOTES
09/13/19 1300   Quick Adds   Type of Visit Initial PT Evaluation   Living Environment   Lives With spouse;child(andrew), dependent   Living Arrangements house   Home Accessibility stairs to enter home   Number of Stairs, Main Entrance 2   Stair Railings, Main Entrance railings safe and in good condition   Transportation Anticipated family or friend will provide   Self-Care   Usual Activity Tolerance good   Current Activity Tolerance poor   Regular Exercise Yes   Activity/Exercise Type strength training   Exercise Amount/Frequency 3-5 times/wk   Equipment Currently Used at Home cane, straight  (uses occassionally)   Functional Level Prior   Ambulation 0-->independent   Transferring 0-->independent   Fall history within last six months no   Which of the above functional risks had a recent onset or change? ambulation   General Information   Onset of Illness/Injury or Date of Surgery - Date 09/12/19   Referring Physician Prabhu Mccoy MD   Patient/Family Goals Statement Return home   Pertinent History of Current Problem (include personal factors and/or comorbidities that impact the POC) POD #1 L BKA due to L chronic ankle and hindfoot pain, CRPS s/p prior subtalar bone block arthrodesis.    Precautions/Limitations fall precautions   Weight-Bearing Status - LLE nonweight-bearing   Weight-Bearing Status - RLE full weight-bearing   General Observations Supine in bed with L LE elevated.   Cognitive Status Examination   Orientation orientation to person, place and time   Level of Consciousness alert   Follows Commands and Answers Questions 100% of the time   Personal Safety and Judgment intact   Pain Assessment   Patient Currently in Pain Yes, see Vital Sign flowsheet  (8/10 L LE)   Integumentary/Edema   Integumentary/Edema Comments L LE surgical site covered with ace wrap and bandages, CDI.   Posture    Posture Forward head position;Protracted shoulders   Range of Motion (ROM)   ROM Comment R LE WFL. L LE hip flexion  "WFL, unable to assess further due to brace.   Strength   Strength Comments R LE WFL. L LE did not formally assess, able to perform 10 jorge lifts.   Bed Mobility   Bed Mobility Comments IND in all bed mobility including supine<>sit.   Transfer Skills   Transfer Comments Sit<>stand with FWW and CGA.   Gait   Gait Comments Pt ambulated with FWW in room for 10' with hop to gait pattern.   Balance   Balance Comments Seated balance unsupported at EOB with SBA. Standing balance with FWW and CGA.   Sensory Examination   Sensory Perception Comments Pt states feeling ankle and foot sensation in amputated L LE.   General Therapy Interventions   Planned Therapy Interventions balance training;fine motor coordination training;neuromuscular re-education;ROM;strengthening;transfer training   Clinical Impression   Criteria for Skilled Therapeutic Intervention yes, treatment indicated   PT Diagnosis Impaired gait   Influenced by the following impairments POD #1 L BKA   Functional limitations due to impairments Decreased independence with transfers and ambulation.   Clinical Presentation Stable/Uncomplicated   Clinical Presentation Rationale Medically stable   Clinical Decision Making (Complexity) Low complexity   Therapy Frequency Daily   Predicted Duration of Therapy Intervention (days/wks) 3 days   Anticipated Equipment Needs at Discharge wheelchair   Anticipated Discharge Disposition Home with Assist   Risk & Benefits of therapy have been explained Yes   Patient, Family & other staff in agreement with plan of care Yes   [facility] AM-PAC TM \"6 Clicks\"   2016, Trustees of [facility], under license to [employer].  All rights reserved.   6 Clicks Short Forms Basic Mobility Inpatient Short Form   [facility] AM-PAC  \"6 Clicks\" V.2 Basic Mobility Inpatient Short Form   1. Turning from your back to your side while in a flat bed without using bedrails? 4 - None   2. Moving from lying on your back to sitting on the " side of a flat bed without using bedrails? 4 - None   3. Moving to and from a bed to a chair (including a wheelchair)? 3 - A Little   4. Standing up from a chair using your arms (e.g., wheelchair, or bedside chair)? 3 - A Little   5. To walk in hospital room? 3 - A Little   6. Climbing 3-5 steps with a railing? 2 - A Lot   Basic Mobility Raw Score (Score out of 24.Lower scores equate to lower levels of function) 19   Total Evaluation Time   Total Evaluation Time (Minutes) 12

## 2019-09-13 NOTE — CONSULTS
Care Transition Initial Assessment - KRISTA     Met with: Patient and spouse    Active Problems:    Below knee amputation status, left (H)       DATA  Lives With: spouse   Living Arrangements: house  Quality of Family Relationships: involved, supportive  Description of Support System: Involved, Supportive  Who is your support system?: Wife  Support Assessment: Adequate family and caregiver support.   Identified issues/concerns regarding health management: Pt's goal is discharge home. Wife has taken the next week off work to care for him. He has a work comp claim through the [place] [employer]. His worker is Chance Pretty, [phone], fax [phone]. Pt has pre-arranged with Chance for a knee scooter and wheelchair. After discussion with PT, pt would also like a bath chair and 3 garb bars for his bathroom. KRISTA called [facility] and spoke with Merna, [phone], fax [phone]. Ordered the WC, bath chair and grab bars. PT will order the knee scooter and issue to pt at discharge. Orders, face sheet and H&P faxed to [facility]. KRISTA spoke with Chance. He will also call [facility] and give them billing information and prescription for WC. Orders faxed to Chance for bath chair and grab bars. Pt has a friend that can install the garb bars for him. Discussed home care and offered choice of agency. Pt would like to use [facility]. Referral emailed by RN CM.      Quality of Family Relationships: involved, supportive  Transportation Anticipated: family or friend will provide    ASSESSMENT  Cognitive Status: Appears alert and oriented.  Concerns to be addressed: On-going discharge planning.     PLAN  Financial costs for the patient includes: None.  Patient given options and choices for discharge: Yes.  Patient/family is agreeable to the plan? YES  Patient Goals and Preferences: discharge home.  Patient anticipates discharging to: home with home care.    MILAGRO Lubin,  [facility]  [phone]

## 2019-09-13 NOTE — PLAN OF CARE
Came from PACU around 1500.  VSS, RA.  CMS intact.  Dressing CDI, elevated on pillow.  Pain managed with oxy and atarax.  Voiding adequate amount in urinal.  Due to dangle.

## 2019-09-13 NOTE — PLAN OF CARE
Discharge Planner PT   Patient plan for discharge: Home with family  Current status: Orders received, eval completed, treatment initiated. Pt is a 48 year old male POD #1 L BKA with history of L chronic ankle and hindfoot pain, CRPS, s/p prior subtalar bone block arthrodesis. Pt is worker's comp. Pt supine in bed on arrival with supportive family present. Requesting information on issuing w/c and knee scooter, pt educated knee scooter would not be appropriate for around 6 weeks as he is not to WB on L LE. Pt insists on getting all possible assistive equipment while here in hospital to go home with to reduce future hassle. SW and unit care coordinator consulted by PT. Pt is IND with all bed mobility. Sit<>stand with FWW and CGA. Pt ambulated 150' (75' FWW and 75' crutches) and CGA. Pt performs with hop to pattern and is very comfortable on crutches. Pt participated in BKA HEP and given handout for home use.    Barriers to return to prior living situation: Stairs not yet done.  Recommendations for discharge: Home with assist from spouse and sons for mobility, especially longer distances, and including stairs and transportation. Recommend w/c for longer distances. Patient already has crutches and a ww.   Rationale for recommendations: Pt will benefit from returning home with assist from family for above activities. Anticipate patient will progress towards independence with functional mobility. Order placed for knee scooter as well. Discussed this with patient and educated him he would not be able to use the knee scooter now due NWB on left. He still wants the knee scooter now as he feels he plans on having it purchased and plans on using it later on if his residual limb gets infected and for some reason can't use his prosthesis.        Entered by: Kirt Fine 09/13/2019 3:23 PM

## 2019-09-13 NOTE — PLAN OF CARE
Patient and oriented, stable vitals, stump clean, dry and intact. Patient had pain issues, iv dilaudid, oxycodone, sched Tylenol, and Atarax with no good effect, I called on call MD, get order for Toradol 30mg every 6 hours, pt continue to report pain, staff called MD for order to change oxycodone to po dilaudid, and change atarax to flexeril. Patient rates pain 7-9 out of 10. Voids adequately per urinal. Will continue to monitor.

## 2019-09-13 NOTE — PROGRESS NOTES
[facility]  Orthopaedics/Foot and Ankle Surgery  Daily Post-Op Note    09/13/2019          Assessment and Plan:    Assessment:   Post-operative day #1  Procedure(s):  LEFT BELOW KNEE AMPUTATION (Left)     No immediate surgical complications identified.  No excessive bleeding      Plan:   1. NWB LLE.  May keep elevated while at rest to limit swelling and pain.  2. Continue with updated pain regimen.  Under appropriate control at this time.  3. Benadryl added to help with itching and sleep, as hydroxyzine was D/C'd.  4. PT/OT to eval  5. ASA, SCDs for DVT prophy.  6. Plan d/c home pending PT eval and continue pain control in 2-3 days, following application of FloTech brace.              Interval History:   Stable.  Doing well.  Improving slowly.  Pain is reasonably controlled since switch from oxycodone to PO dilaudid, addition of Toradol and change from hydroxyzine to flexeril.  No fevers. The patient states that he was not able to sleep through the night. The patient states that he had done well previously with utilization of benadryl at night for sleep.              Physical Exam:   Blood pressure 114/64, pulse 108, temperature 98.2  F (36.8  C), temperature source Oral, resp. rate 16, height 1.829 m (6'), weight 122 kg (269 lb), SpO2 91 %.  I/O last 3 completed shifts:  In: 1640 [P.O.:240; I.V.:1400]  Out: 3875 [Urine:3850; Blood:25]    Dressing is clean dry and intact. Splint in in appropriate position with knee placed in full extension. Denies phantom pain in his left lower extremity.            Data:   All laboratory data related to this surgery reviewed  Recent Labs   Lab Test 09/12/19  1013 08/11/15  1408 08/10/15  1215 08/01/15  2150 07/29/15  1020   HGB 15.0 12.6* 8.3* 12.0* 12.0*     Recent Labs   Lab Test 06/06/12  0853   INR 0.99      Recent Labs   Lab Test 09/12/19  1013 08/11/15  1408   WBC  --  7.5   PLT  --  225   POTASSIUM 3.9  --    CR 0.96  --

## 2019-09-13 NOTE — PLAN OF CARE
Pain issues throughout  the day.  Po dilaudid, toradol, flexeril for pain.  Benedryl ordered for sleep.  Voiding in urinal.  LLE elevated.

## 2019-09-14 ENCOUNTER — APPOINTMENT (OUTPATIENT)
Dept: PHYSICAL THERAPY | Facility: CLINIC | Age: 49
DRG: 040 | End: 2019-09-14
Attending: ORTHOPAEDIC SURGERY
Payer: OTHER MISCELLANEOUS

## 2019-09-14 LAB
GLUCOSE BLDC GLUCOMTR-MCNC: 172 MG/DL (ref 70–99)
GLUCOSE BLDC GLUCOMTR-MCNC: 95 MG/DL (ref 70–99)
LACTATE BLD-SCNC: 1.3 MMOL/L (ref 0.7–2)

## 2019-09-14 PROCEDURE — 36415 COLL VENOUS BLD VENIPUNCTURE: CPT | Performed by: ORTHOPAEDIC SURGERY

## 2019-09-14 PROCEDURE — 00000146 ZZHCL STATISTIC GLUCOSE BY METER IP

## 2019-09-14 PROCEDURE — 25000132 ZZH RX MED GY IP 250 OP 250 PS 637: Performed by: ORTHOPAEDIC SURGERY

## 2019-09-14 PROCEDURE — 83605 ASSAY OF LACTIC ACID: CPT | Performed by: ORTHOPAEDIC SURGERY

## 2019-09-14 PROCEDURE — 25000128 H RX IP 250 OP 636: Performed by: ORTHOPAEDIC SURGERY

## 2019-09-14 PROCEDURE — 97116 GAIT TRAINING THERAPY: CPT | Mod: GP | Performed by: PHYSICAL THERAPIST

## 2019-09-14 PROCEDURE — 12000000 ZZH R&B MED SURG/OB

## 2019-09-14 PROCEDURE — 25000128 H RX IP 250 OP 636: Performed by: PHYSICIAN ASSISTANT

## 2019-09-14 PROCEDURE — 25000132 ZZH RX MED GY IP 250 OP 250 PS 637: Performed by: PHYSICIAN ASSISTANT

## 2019-09-14 RX ORDER — ASPIRIN 325 MG
325 TABLET ORAL DAILY
Qty: 42 TABLET | Refills: 0 | Status: SHIPPED | OUTPATIENT
Start: 2019-09-15

## 2019-09-14 RX ORDER — SENNOSIDES 8.6 MG
2 TABLET ORAL 2 TIMES DAILY PRN
Qty: 30 TABLET | Refills: 0 | Status: SHIPPED | OUTPATIENT
Start: 2019-09-14

## 2019-09-14 RX ORDER — CYCLOBENZAPRINE HCL 10 MG
10 TABLET ORAL 3 TIMES DAILY PRN
Qty: 30 TABLET | Refills: 0 | Status: SHIPPED | OUTPATIENT
Start: 2019-09-14

## 2019-09-14 RX ORDER — POLYETHYLENE GLYCOL 3350 17 G/17G
1 POWDER, FOR SOLUTION ORAL DAILY PRN
COMMUNITY
Start: 2019-09-14

## 2019-09-14 RX ORDER — DIPHENHYDRAMINE HCL 25 MG
25 CAPSULE ORAL EVERY 6 HOURS PRN
COMMUNITY
Start: 2019-09-14

## 2019-09-14 RX ORDER — HYDROMORPHONE HYDROCHLORIDE 2 MG/1
2-4 TABLET ORAL
Qty: 30 TABLET | Refills: 0 | Status: SHIPPED | OUTPATIENT
Start: 2019-09-14

## 2019-09-14 RX ADMIN — ACETAMINOPHEN 975 MG: 325 TABLET ORAL at 03:17

## 2019-09-14 RX ADMIN — HYDROMORPHONE HYDROCHLORIDE 4 MG: 2 TABLET ORAL at 21:21

## 2019-09-14 RX ADMIN — HYDROMORPHONE HYDROCHLORIDE 0.5 MG: 1 INJECTION, SOLUTION INTRAMUSCULAR; INTRAVENOUS; SUBCUTANEOUS at 02:02

## 2019-09-14 RX ADMIN — LAMOTRIGINE 100 MG: 100 TABLET ORAL at 08:04

## 2019-09-14 RX ADMIN — ACETAMINOPHEN 975 MG: 325 TABLET ORAL at 10:00

## 2019-09-14 RX ADMIN — HYDROMORPHONE HYDROCHLORIDE 0.5 MG: 1 INJECTION, SOLUTION INTRAMUSCULAR; INTRAVENOUS; SUBCUTANEOUS at 08:52

## 2019-09-14 RX ADMIN — ACETAMINOPHEN 975 MG: 325 TABLET ORAL at 18:28

## 2019-09-14 RX ADMIN — KETOROLAC TROMETHAMINE 30 MG: 30 INJECTION, SOLUTION INTRAMUSCULAR at 15:50

## 2019-09-14 RX ADMIN — HYDROMORPHONE HYDROCHLORIDE 4 MG: 2 TABLET ORAL at 13:38

## 2019-09-14 RX ADMIN — HYDROMORPHONE HYDROCHLORIDE 0.5 MG: 1 INJECTION, SOLUTION INTRAMUSCULAR; INTRAVENOUS; SUBCUTANEOUS at 19:38

## 2019-09-14 RX ADMIN — BUPROPION HYDROCHLORIDE 75 MG: 75 TABLET, FILM COATED ORAL at 20:22

## 2019-09-14 RX ADMIN — ASPIRIN 325 MG ORAL TABLET 325 MG: 325 PILL ORAL at 08:03

## 2019-09-14 RX ADMIN — SENNOSIDES AND DOCUSATE SODIUM 2 TABLET: 8.6; 5 TABLET ORAL at 08:03

## 2019-09-14 RX ADMIN — OXYCODONE HYDROCHLORIDE AND ACETAMINOPHEN 500 MG: 500 TABLET ORAL at 20:22

## 2019-09-14 RX ADMIN — GABAPENTIN 300 MG: 300 CAPSULE ORAL at 15:44

## 2019-09-14 RX ADMIN — HYDROMORPHONE HYDROCHLORIDE 4 MG: 2 TABLET ORAL at 00:09

## 2019-09-14 RX ADMIN — OXYCODONE HYDROCHLORIDE AND ACETAMINOPHEN 500 MG: 500 TABLET ORAL at 08:04

## 2019-09-14 RX ADMIN — HYDROMORPHONE HYDROCHLORIDE 4 MG: 2 TABLET ORAL at 03:17

## 2019-09-14 RX ADMIN — GABAPENTIN 300 MG: 300 CAPSULE ORAL at 08:04

## 2019-09-14 RX ADMIN — SENNOSIDES AND DOCUSATE SODIUM 2 TABLET: 8.6; 5 TABLET ORAL at 20:22

## 2019-09-14 RX ADMIN — GABAPENTIN 300 MG: 300 CAPSULE ORAL at 21:21

## 2019-09-14 RX ADMIN — ESCITALOPRAM OXALATE 10 MG: 10 TABLET ORAL at 08:04

## 2019-09-14 RX ADMIN — PRAVASTATIN SODIUM 10 MG: 10 TABLET ORAL at 20:22

## 2019-09-14 RX ADMIN — DIPHENHYDRAMINE HYDROCHLORIDE 25 MG: 25 CAPSULE ORAL at 18:28

## 2019-09-14 RX ADMIN — HYDROMORPHONE HYDROCHLORIDE 4 MG: 2 TABLET ORAL at 10:00

## 2019-09-14 RX ADMIN — KETOROLAC TROMETHAMINE 30 MG: 30 INJECTION, SOLUTION INTRAMUSCULAR at 22:26

## 2019-09-14 RX ADMIN — DIPHENHYDRAMINE HYDROCHLORIDE 25 MG: 25 CAPSULE ORAL at 12:21

## 2019-09-14 RX ADMIN — HYDROMORPHONE HYDROCHLORIDE 4 MG: 2 TABLET ORAL at 06:16

## 2019-09-14 RX ADMIN — LAMOTRIGINE 100 MG: 100 TABLET ORAL at 20:22

## 2019-09-14 RX ADMIN — KETOROLAC TROMETHAMINE 30 MG: 30 INJECTION, SOLUTION INTRAMUSCULAR at 08:00

## 2019-09-14 RX ADMIN — KETOROLAC TROMETHAMINE 30 MG: 30 INJECTION, SOLUTION INTRAMUSCULAR at 02:02

## 2019-09-14 RX ADMIN — LISINOPRIL AND HYDROCHLOROTHIAZIDE 1 TABLET: 12.5; 2 TABLET ORAL at 20:22

## 2019-09-14 RX ADMIN — FLUTICASONE FUROATE AND VILANTEROL TRIFENATATE 1 PUFF: 100; 25 POWDER RESPIRATORY (INHALATION) at 08:04

## 2019-09-14 RX ADMIN — BUPROPION HYDROCHLORIDE 75 MG: 75 TABLET, FILM COATED ORAL at 08:03

## 2019-09-14 RX ADMIN — LISINOPRIL AND HYDROCHLOROTHIAZIDE 1 TABLET: 12.5; 2 TABLET ORAL at 08:04

## 2019-09-14 RX ADMIN — CYCLOBENZAPRINE HYDROCHLORIDE 10 MG: 10 TABLET, FILM COATED ORAL at 08:00

## 2019-09-14 RX ADMIN — MILNACIPRAN HYDROCHLORIDE 50 MG: 50 TABLET, FILM COATED ORAL at 08:02

## 2019-09-14 RX ADMIN — CYCLOBENZAPRINE HYDROCHLORIDE 10 MG: 10 TABLET, FILM COATED ORAL at 15:44

## 2019-09-14 RX ADMIN — MILNACIPRAN HYDROCHLORIDE 50 MG: 50 TABLET, FILM COATED ORAL at 20:22

## 2019-09-14 RX ADMIN — HYDROMORPHONE HYDROCHLORIDE 4 MG: 2 TABLET ORAL at 16:41

## 2019-09-14 ASSESSMENT — ACTIVITIES OF DAILY LIVING (ADL)
ADLS_ACUITY_SCORE: 17
ADLS_ACUITY_SCORE: 17
ADLS_ACUITY_SCORE: 16
ADLS_ACUITY_SCORE: 17
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
## 2019-09-14 NOTE — PLAN OF CARE
Pt rates pain between 7-8 pre and post pain medication on (L) leg. Is on tylenol, po and IV dilaudid, toradol, and flexeril. C/O numbness and tingling on (L) leg. Up with one assist. Is slightly tachy, low 100's.  Dressing on (L) leg is CDI. Will cont to monitor.

## 2019-09-14 NOTE — PLAN OF CARE
Pt A&O X4. VSS on RA. CMS intact, dressing CDI. Up with A1, voiding adequately in urinal. Managing pain with PO dilaudid, flexeril, and toradol, IV dilaudid X1 for breakthrough pain. Possibly discharging tomorrow if pain managed. Continue to monitor.

## 2019-09-14 NOTE — PLAN OF CARE
Discharge Planner PT   Patient plan for discharge: Home with wife  Current status: Patient able to demonstrate independence with bed mobility, sit to stand and amb with crutches on level surface 150 feet times 2. Able to go up and down 3 steps with one rail and one crutch without assist. Discussed needs for home. Noted SW ordered w/c,, shower chair and grab bars for bathroom. Therapist placed order for roll-a-bout and called [facility] to place the order.   Barriers to return to prior living situation: none  Recommendations for discharge: Home with HHPT and OT to address needs in home setting.   Rationale for recommendations: Patient is demonstrating independence in hospital setting but with new amputation would benefit from HHPT and OT eval to address needs in home setting. Anticipate they will only see him 1-2 times.     Physical Therapy Discharge Summary    Reason for therapy discharge:    All goals and outcomes met, no further needs identified.    Progress towards therapy goal(s). See goals on Care Plan in [facility] electronic health record for goal details.  Goals met    Therapy recommendation(s):    Continued therapy is recommended.  Rationale/Recommendations:  Patient would benefit from home PT and OT to address needs in home setting due to patient being a new amputee. .           Entered by: Snow Cordero 09/14/2019 3:56 PM

## 2019-09-14 NOTE — PLAN OF CARE
Pt A&O x4. VSS on Ra. Dressing to left leg CDI. Numbness and tingling to stump. Rating pain 7-8/10. Taking po and iv dilaudid, flexeril, toradol, and tylenol for pain. Slightly tachy, low 100s. Up w/ A1. Voiding in urinal. Will continue to monitor.

## 2019-09-15 PROCEDURE — L5999 LOWR EXTREMITY PROSTHES NOS: HCPCS

## 2019-09-15 PROCEDURE — L5688 BK WAIST BELT WEBBING: HCPCS

## 2019-09-15 PROCEDURE — 25000132 ZZH RX MED GY IP 250 OP 250 PS 637: Performed by: ORTHOPAEDIC SURGERY

## 2019-09-15 PROCEDURE — 25000132 ZZH RX MED GY IP 250 OP 250 PS 637: Performed by: PHYSICIAN ASSISTANT

## 2019-09-15 PROCEDURE — L8420 PROSTHETIC SOCK MULTI PLY BK: HCPCS

## 2019-09-15 PROCEDURE — L5450 POSTOP APP NON-WGT BEAR DSG: HCPCS

## 2019-09-15 PROCEDURE — 25000128 H RX IP 250 OP 636: Performed by: PHYSICIAN ASSISTANT

## 2019-09-15 PROCEDURE — 12000000 ZZH R&B MED SURG/OB

## 2019-09-15 PROCEDURE — L8440 SHRINKER BELOW KNEE: HCPCS

## 2019-09-15 PROCEDURE — 25000128 H RX IP 250 OP 636: Performed by: ORTHOPAEDIC SURGERY

## 2019-09-15 RX ORDER — GABAPENTIN 300 MG/1
300 CAPSULE ORAL 3 TIMES DAILY
Status: DISCONTINUED | OUTPATIENT
Start: 2019-09-15 | End: 2019-09-16

## 2019-09-15 RX ADMIN — ESCITALOPRAM OXALATE 10 MG: 10 TABLET ORAL at 08:15

## 2019-09-15 RX ADMIN — DIPHENHYDRAMINE HYDROCHLORIDE 25 MG: 25 CAPSULE ORAL at 20:26

## 2019-09-15 RX ADMIN — LAMOTRIGINE 100 MG: 100 TABLET ORAL at 20:19

## 2019-09-15 RX ADMIN — SENNOSIDES AND DOCUSATE SODIUM 2 TABLET: 8.6; 5 TABLET ORAL at 20:19

## 2019-09-15 RX ADMIN — HYDROMORPHONE HYDROCHLORIDE 4 MG: 2 TABLET ORAL at 10:36

## 2019-09-15 RX ADMIN — ASPIRIN 325 MG ORAL TABLET 325 MG: 325 PILL ORAL at 08:14

## 2019-09-15 RX ADMIN — HYDROMORPHONE HYDROCHLORIDE 0.5 MG: 1 INJECTION, SOLUTION INTRAMUSCULAR; INTRAVENOUS; SUBCUTANEOUS at 20:19

## 2019-09-15 RX ADMIN — ACETAMINOPHEN 975 MG: 325 TABLET ORAL at 10:36

## 2019-09-15 RX ADMIN — GABAPENTIN 300 MG: 300 CAPSULE ORAL at 16:24

## 2019-09-15 RX ADMIN — HYDROMORPHONE HYDROCHLORIDE 4 MG: 2 TABLET ORAL at 22:36

## 2019-09-15 RX ADMIN — PRAVASTATIN SODIUM 10 MG: 10 TABLET ORAL at 20:18

## 2019-09-15 RX ADMIN — GABAPENTIN 300 MG: 300 CAPSULE ORAL at 22:36

## 2019-09-15 RX ADMIN — KETOROLAC TROMETHAMINE 30 MG: 30 INJECTION, SOLUTION INTRAMUSCULAR at 17:38

## 2019-09-15 RX ADMIN — BUPROPION HYDROCHLORIDE 75 MG: 75 TABLET, FILM COATED ORAL at 08:15

## 2019-09-15 RX ADMIN — LISINOPRIL AND HYDROCHLOROTHIAZIDE 1 TABLET: 12.5; 2 TABLET ORAL at 20:18

## 2019-09-15 RX ADMIN — HYDROMORPHONE HYDROCHLORIDE 4 MG: 2 TABLET ORAL at 19:22

## 2019-09-15 RX ADMIN — HYDROMORPHONE HYDROCHLORIDE 4 MG: 2 TABLET ORAL at 00:22

## 2019-09-15 RX ADMIN — OXYCODONE HYDROCHLORIDE AND ACETAMINOPHEN 500 MG: 500 TABLET ORAL at 20:18

## 2019-09-15 RX ADMIN — MILNACIPRAN HYDROCHLORIDE 50 MG: 50 TABLET, FILM COATED ORAL at 08:15

## 2019-09-15 RX ADMIN — GABAPENTIN 300 MG: 300 CAPSULE ORAL at 08:15

## 2019-09-15 RX ADMIN — HYDROMORPHONE HYDROCHLORIDE 0.5 MG: 1 INJECTION, SOLUTION INTRAMUSCULAR; INTRAVENOUS; SUBCUTANEOUS at 18:08

## 2019-09-15 RX ADMIN — OXYCODONE HYDROCHLORIDE AND ACETAMINOPHEN 500 MG: 500 TABLET ORAL at 08:15

## 2019-09-15 RX ADMIN — HYDROMORPHONE HYDROCHLORIDE 4 MG: 2 TABLET ORAL at 07:41

## 2019-09-15 RX ADMIN — HYDROMORPHONE HYDROCHLORIDE 0.5 MG: 1 INJECTION, SOLUTION INTRAMUSCULAR; INTRAVENOUS; SUBCUTANEOUS at 08:50

## 2019-09-15 RX ADMIN — HYDROMORPHONE HYDROCHLORIDE 4 MG: 2 TABLET ORAL at 16:25

## 2019-09-15 RX ADMIN — FLUTICASONE FUROATE AND VILANTEROL TRIFENATATE 1 PUFF: 100; 25 POWDER RESPIRATORY (INHALATION) at 08:16

## 2019-09-15 RX ADMIN — DIPHENHYDRAMINE HYDROCHLORIDE 25 MG: 25 CAPSULE ORAL at 13:38

## 2019-09-15 RX ADMIN — ACETAMINOPHEN 975 MG: 325 TABLET ORAL at 04:47

## 2019-09-15 RX ADMIN — LAMOTRIGINE 100 MG: 100 TABLET ORAL at 08:15

## 2019-09-15 RX ADMIN — BUPROPION HYDROCHLORIDE 75 MG: 75 TABLET, FILM COATED ORAL at 20:18

## 2019-09-15 RX ADMIN — KETOROLAC TROMETHAMINE 30 MG: 30 INJECTION, SOLUTION INTRAMUSCULAR at 05:22

## 2019-09-15 RX ADMIN — HYDROMORPHONE HYDROCHLORIDE 4 MG: 2 TABLET ORAL at 13:34

## 2019-09-15 RX ADMIN — KETOROLAC TROMETHAMINE 30 MG: 30 INJECTION, SOLUTION INTRAMUSCULAR at 11:19

## 2019-09-15 RX ADMIN — CYCLOBENZAPRINE HYDROCHLORIDE 10 MG: 10 TABLET, FILM COATED ORAL at 00:22

## 2019-09-15 RX ADMIN — LISINOPRIL AND HYDROCHLOROTHIAZIDE 1 TABLET: 12.5; 2 TABLET ORAL at 08:15

## 2019-09-15 RX ADMIN — CYCLOBENZAPRINE HYDROCHLORIDE 10 MG: 10 TABLET, FILM COATED ORAL at 16:24

## 2019-09-15 RX ADMIN — SENNOSIDES AND DOCUSATE SODIUM 2 TABLET: 8.6; 5 TABLET ORAL at 08:14

## 2019-09-15 RX ADMIN — CYCLOBENZAPRINE HYDROCHLORIDE 10 MG: 10 TABLET, FILM COATED ORAL at 07:41

## 2019-09-15 RX ADMIN — HYDROMORPHONE HYDROCHLORIDE 4 MG: 2 TABLET ORAL at 04:46

## 2019-09-15 RX ADMIN — MILNACIPRAN HYDROCHLORIDE 50 MG: 50 TABLET, FILM COATED ORAL at 20:18

## 2019-09-15 ASSESSMENT — ACTIVITIES OF DAILY LIVING (ADL)
ADLS_ACUITY_SCORE: 13
ADLS_ACUITY_SCORE: 14
ADLS_ACUITY_SCORE: 14
ADLS_ACUITY_SCORE: 13

## 2019-09-15 NOTE — PLAN OF CARE
A&Ox4. VSS on RA ex tachy. IV SL. Pain managed with IV and PO dilaudid, toradol, flexeril and sched tylenol. CMS intact. BS+, +gas, no BM. Voiding adequately in urinal or BR. Up A1/SBA with walker or crutches. Dressing CDI. Denies nausea, SOB. Continue to monitor and manage pain.

## 2019-09-15 NOTE — PLAN OF CARE
Pt A&O X4, anxious at times. VSS on RA. CMS intact, stump dressing CDI. Voiding in BR. Up with standby assist, pt walked with wife in hallway 2x this shift. Pt C/o 8/10 pain most of the day. Pt taking Po dilaudid, flexeril, toradol and IV dilaudid X1. Orthotics removed splint and fitted pt for FloTech brace. Plan to discharge tomorrow if pain managed. Continue to monitor.

## 2019-09-15 NOTE — PROGRESS NOTES
SW:  D:  Patient is interested in a roll about scooter.  Patient states he is wanting this for later when his weight bearing restrictions are lifted.  At that time patient may no longer need the roll about.  Will follow up with ortho and the care coordinator tomorrow regarding the roll about as it is difficult to address roll abouts on the weekend and it is ortho on-call.  P:  Will continue to follow.

## 2019-09-16 ENCOUNTER — APPOINTMENT (OUTPATIENT)
Dept: ULTRASOUND IMAGING | Facility: CLINIC | Age: 49
DRG: 040 | End: 2019-09-16
Attending: NURSE PRACTITIONER
Payer: OTHER MISCELLANEOUS

## 2019-09-16 ENCOUNTER — APPOINTMENT (OUTPATIENT)
Dept: GENERAL RADIOLOGY | Facility: CLINIC | Age: 49
DRG: 040 | End: 2019-09-16
Attending: NURSE PRACTITIONER
Payer: OTHER MISCELLANEOUS

## 2019-09-16 ENCOUNTER — ANESTHESIA EVENT (OUTPATIENT)
Dept: INTENSIVE CARE | Facility: CLINIC | Age: 49
DRG: 040 | End: 2019-09-16
Payer: OTHER MISCELLANEOUS

## 2019-09-16 ENCOUNTER — APPOINTMENT (OUTPATIENT)
Dept: CARDIOLOGY | Facility: CLINIC | Age: 49
DRG: 040 | End: 2019-09-16
Attending: INTERNAL MEDICINE
Payer: OTHER MISCELLANEOUS

## 2019-09-16 LAB
ALBUMIN SERPL-MCNC: 1.4 G/DL (ref 3.4–5)
ALBUMIN SERPL-MCNC: 2.8 G/DL (ref 3.4–5)
ALP SERPL-CCNC: 100 U/L (ref 40–150)
ALP SERPL-CCNC: 50 U/L (ref 40–150)
ALT SERPL W P-5'-P-CCNC: 14 U/L (ref 0–70)
ALT SERPL W P-5'-P-CCNC: 30 U/L (ref 0–70)
ANION GAP SERPL CALCULATED.3IONS-SCNC: 16 MMOL/L (ref 3–14)
ANION GAP SERPL CALCULATED.3IONS-SCNC: 5 MMOL/L (ref 3–14)
AST SERPL W P-5'-P-CCNC: 20 U/L (ref 0–45)
AST SERPL W P-5'-P-CCNC: 46 U/L (ref 0–45)
BASE DEFICIT BLDA-SCNC: 0.3 MMOL/L
BASE EXCESS BLDA CALC-SCNC: 0.8 MMOL/L
BASOPHILS # BLD AUTO: 0 10E9/L (ref 0–0.2)
BASOPHILS NFR BLD AUTO: 0.4 %
BILIRUB SERPL-MCNC: 0.5 MG/DL (ref 0.2–1.3)
BILIRUB SERPL-MCNC: 1 MG/DL (ref 0.2–1.3)
BUN SERPL-MCNC: 16 MG/DL (ref 7–30)
BUN SERPL-MCNC: 8 MG/DL (ref 7–30)
CALCIUM SERPL-MCNC: 7.5 MG/DL (ref 8.5–10.1)
CALCIUM SERPL-MCNC: 8.5 MG/DL (ref 8.5–10.1)
CHLORIDE SERPL-SCNC: 104 MMOL/L (ref 94–109)
CHLORIDE SERPL-SCNC: 107 MMOL/L (ref 94–109)
CO2 SERPL-SCNC: 17 MMOL/L (ref 20–32)
CO2 SERPL-SCNC: 31 MMOL/L (ref 20–32)
COPATH REPORT: NORMAL
CREAT SERPL-MCNC: 0.73 MG/DL (ref 0.66–1.25)
CREAT SERPL-MCNC: 1.77 MG/DL (ref 0.66–1.25)
DIFFERENTIAL METHOD BLD: ABNORMAL
EOSINOPHIL # BLD AUTO: 0.3 10E9/L (ref 0–0.7)
EOSINOPHIL NFR BLD AUTO: 4.1 %
ERYTHROCYTE [DISTWIDTH] IN BLOOD BY AUTOMATED COUNT: 12.8 % (ref 10–15)
GFR SERPL CREATININE-BSD FRML MDRD: 45 ML/MIN/{1.73_M2}
GFR SERPL CREATININE-BSD FRML MDRD: >90 ML/MIN/{1.73_M2}
GLUCOSE BLDC GLUCOMTR-MCNC: 100 MG/DL (ref 70–99)
GLUCOSE BLDC GLUCOMTR-MCNC: 125 MG/DL (ref 70–99)
GLUCOSE BLDC GLUCOMTR-MCNC: 94 MG/DL (ref 70–99)
GLUCOSE SERPL-MCNC: 108 MG/DL (ref 70–99)
GLUCOSE SERPL-MCNC: 67 MG/DL (ref 70–99)
HCO3 BLD-SCNC: 27 MMOL/L (ref 21–28)
HCO3 BLD-SCNC: 28 MMOL/L (ref 21–28)
HCT VFR BLD AUTO: 27.7 % (ref 40–53)
HGB BLD-MCNC: 9.2 G/DL (ref 13.3–17.7)
IMM GRANULOCYTES # BLD: 0 10E9/L (ref 0–0.4)
IMM GRANULOCYTES NFR BLD: 0.6 %
LACTATE BLD-SCNC: 2.7 MMOL/L (ref 0.7–2)
LACTATE BLD-SCNC: 3.1 MMOL/L (ref 0.7–2)
LACTATE BLD-SCNC: 3.6 MMOL/L (ref 0.7–2)
LYMPHOCYTES # BLD AUTO: 0.2 10E9/L (ref 0.8–5.3)
LYMPHOCYTES NFR BLD AUTO: 3.2 %
MAGNESIUM SERPL-MCNC: 0.7 MG/DL (ref 1.6–2.3)
MAGNESIUM SERPL-MCNC: 1.8 MG/DL (ref 1.6–2.3)
MCH RBC QN AUTO: 29.1 PG (ref 26.5–33)
MCHC RBC AUTO-ENTMCNC: 33.2 G/DL (ref 31.5–36.5)
MCV RBC AUTO: 88 FL (ref 78–100)
MONOCYTES # BLD AUTO: 0.3 10E9/L (ref 0–1.3)
MONOCYTES NFR BLD AUTO: 3.6 %
NEUTROPHILS # BLD AUTO: 6.1 10E9/L (ref 1.6–8.3)
NEUTROPHILS NFR BLD AUTO: 88.1 %
NRBC # BLD AUTO: 0 10*3/UL
NRBC BLD AUTO-RTO: 0 /100
O2/TOTAL GAS SETTING VFR VENT: ABNORMAL %
OXYHGB MFR BLD: 81 % (ref 92–100)
OXYHGB MFR BLD: 97 % (ref 92–100)
PCO2 BLD: 51 MM HG (ref 35–45)
PCO2 BLD: 52 MM HG (ref 35–45)
PH BLD: 7.33 PH (ref 7.35–7.45)
PH BLD: 7.34 PH (ref 7.35–7.45)
PHOSPHATE SERPL-MCNC: 1.3 MG/DL (ref 2.5–4.5)
PHOSPHATE SERPL-MCNC: 4.2 MG/DL (ref 2.5–4.5)
PLATELET # BLD AUTO: 195 10E9/L (ref 150–450)
PO2 BLD: 107 MM HG (ref 80–105)
PO2 BLD: 49 MM HG (ref 80–105)
POTASSIUM SERPL-SCNC: 4.1 MMOL/L (ref 3.4–5.3)
POTASSIUM SERPL-SCNC: 4.4 MMOL/L (ref 3.4–5.3)
PROCALCITONIN SERPL-MCNC: 93.56 NG/ML
PROT SERPL-MCNC: 3 G/DL (ref 6.8–8.8)
PROT SERPL-MCNC: 6.3 G/DL (ref 6.8–8.8)
RBC # BLD AUTO: 3.16 10E12/L (ref 4.4–5.9)
SODIUM SERPL-SCNC: 140 MMOL/L (ref 133–144)
SODIUM SERPL-SCNC: 140 MMOL/L (ref 133–144)
TROPONIN I SERPL-MCNC: 0.02 UG/L (ref 0–0.04)
WBC # BLD AUTO: 6.9 10E9/L (ref 4–11)

## 2019-09-16 PROCEDURE — 36415 COLL VENOUS BLD VENIPUNCTURE: CPT | Performed by: NURSE PRACTITIONER

## 2019-09-16 PROCEDURE — 84484 ASSAY OF TROPONIN QUANT: CPT | Performed by: NURSE PRACTITIONER

## 2019-09-16 PROCEDURE — 99292 CRITICAL CARE ADDL 30 MIN: CPT | Mod: GC | Performed by: INTERNAL MEDICINE

## 2019-09-16 PROCEDURE — 40000983 ZZH STATISTIC HFNC ADULT NON-CPAP

## 2019-09-16 PROCEDURE — 82805 BLOOD GASES W/O2 SATURATION: CPT | Performed by: NURSE PRACTITIONER

## 2019-09-16 PROCEDURE — 25000128 H RX IP 250 OP 636: Performed by: STUDENT IN AN ORGANIZED HEALTH CARE EDUCATION/TRAINING PROGRAM

## 2019-09-16 PROCEDURE — 25800030 ZZH RX IP 258 OP 636: Performed by: ORTHOPAEDIC SURGERY

## 2019-09-16 PROCEDURE — 27210338 ZZH CIRCUIT HUMID FACE/TRACH MSK

## 2019-09-16 PROCEDURE — 25000131 ZZH RX MED GY IP 250 OP 636 PS 637: Performed by: PHYSICIAN ASSISTANT

## 2019-09-16 PROCEDURE — 20000003 ZZH R&B ICU

## 2019-09-16 PROCEDURE — 93321 DOPPLER ECHO F-UP/LMTD STD: CPT | Mod: 26 | Performed by: INTERNAL MEDICINE

## 2019-09-16 PROCEDURE — 25000128 H RX IP 250 OP 636

## 2019-09-16 PROCEDURE — 99291 CRITICAL CARE FIRST HOUR: CPT | Performed by: INTERNAL MEDICINE

## 2019-09-16 PROCEDURE — 93005 ELECTROCARDIOGRAM TRACING: CPT

## 2019-09-16 PROCEDURE — 93325 DOPPLER ECHO COLOR FLOW MAPG: CPT | Mod: 26 | Performed by: INTERNAL MEDICINE

## 2019-09-16 PROCEDURE — 40000257 ZZH STATISTIC CONSULT NO CHARGE VASC ACCESS

## 2019-09-16 PROCEDURE — L8440 SHRINKER BELOW KNEE: HCPCS

## 2019-09-16 PROCEDURE — 87186 SC STD MICRODIL/AGAR DIL: CPT | Performed by: NURSE PRACTITIONER

## 2019-09-16 PROCEDURE — 87040 BLOOD CULTURE FOR BACTERIA: CPT | Performed by: NURSE PRACTITIONER

## 2019-09-16 PROCEDURE — 25000132 ZZH RX MED GY IP 250 OP 250 PS 637: Performed by: PHYSICIAN ASSISTANT

## 2019-09-16 PROCEDURE — 82805 BLOOD GASES W/O2 SATURATION: CPT | Performed by: INTERNAL MEDICINE

## 2019-09-16 PROCEDURE — 25000128 H RX IP 250 OP 636: Performed by: NURSE ANESTHETIST, CERTIFIED REGISTERED

## 2019-09-16 PROCEDURE — 87077 CULTURE AEROBIC IDENTIFY: CPT | Performed by: NURSE PRACTITIONER

## 2019-09-16 PROCEDURE — 25000132 ZZH RX MED GY IP 250 OP 250 PS 637: Performed by: ORTHOPAEDIC SURGERY

## 2019-09-16 PROCEDURE — L5688 BK WAIST BELT WEBBING: HCPCS

## 2019-09-16 PROCEDURE — 94660 CPAP INITIATION&MGMT: CPT

## 2019-09-16 PROCEDURE — 27210339 ZZH CIRCUIT HUMIDITY W/CPAP BIP

## 2019-09-16 PROCEDURE — 25000132 ZZH RX MED GY IP 250 OP 250 PS 637: Performed by: NURSE PRACTITIONER

## 2019-09-16 PROCEDURE — 80053 COMPREHEN METABOLIC PANEL: CPT | Performed by: NURSE PRACTITIONER

## 2019-09-16 PROCEDURE — 99291 CRITICAL CARE FIRST HOUR: CPT | Performed by: NURSE PRACTITIONER

## 2019-09-16 PROCEDURE — 36415 COLL VENOUS BLD VENIPUNCTURE: CPT | Performed by: ORTHOPAEDIC SURGERY

## 2019-09-16 PROCEDURE — 83605 ASSAY OF LACTIC ACID: CPT | Performed by: NURSE PRACTITIONER

## 2019-09-16 PROCEDURE — 00000146 ZZHCL STATISTIC GLUCOSE BY METER IP

## 2019-09-16 PROCEDURE — L5999 LOWR EXTREMITY PROSTHES NOS: HCPCS

## 2019-09-16 PROCEDURE — 85025 COMPLETE CBC W/AUTO DIFF WBC: CPT | Performed by: NURSE PRACTITIONER

## 2019-09-16 PROCEDURE — 5A1935Z RESPIRATORY VENTILATION, LESS THAN 24 CONSECUTIVE HOURS: ICD-10-PCS | Performed by: ORTHOPAEDIC SURGERY

## 2019-09-16 PROCEDURE — 40000008 ZZH STATISTIC AIRWAY CARE

## 2019-09-16 PROCEDURE — 25000125 ZZHC RX 250: Performed by: NURSE PRACTITIONER

## 2019-09-16 PROCEDURE — 93010 ELECTROCARDIOGRAM REPORT: CPT | Performed by: INTERNAL MEDICINE

## 2019-09-16 PROCEDURE — 36569 INSJ PICC 5 YR+ W/O IMAGING: CPT

## 2019-09-16 PROCEDURE — 83605 ASSAY OF LACTIC ACID: CPT | Performed by: ORTHOPAEDIC SURGERY

## 2019-09-16 PROCEDURE — 36600 WITHDRAWAL OF ARTERIAL BLOOD: CPT

## 2019-09-16 PROCEDURE — 25800030 ZZH RX IP 258 OP 636: Performed by: NURSE PRACTITIONER

## 2019-09-16 PROCEDURE — 84145 PROCALCITONIN (PCT): CPT | Performed by: NURSE PRACTITIONER

## 2019-09-16 PROCEDURE — L5450 POSTOP APP NON-WGT BEAR DSG: HCPCS

## 2019-09-16 PROCEDURE — 93970 EXTREMITY STUDY: CPT

## 2019-09-16 PROCEDURE — 84100 ASSAY OF PHOSPHORUS: CPT | Performed by: NURSE PRACTITIONER

## 2019-09-16 PROCEDURE — 94002 VENT MGMT INPAT INIT DAY: CPT

## 2019-09-16 PROCEDURE — L8420 PROSTHETIC SOCK MULTI PLY BK: HCPCS

## 2019-09-16 PROCEDURE — 36415 COLL VENOUS BLD VENIPUNCTURE: CPT | Performed by: STUDENT IN AN ORGANIZED HEALTH CARE EDUCATION/TRAINING PROGRAM

## 2019-09-16 PROCEDURE — 25000128 H RX IP 250 OP 636: Performed by: ORTHOPAEDIC SURGERY

## 2019-09-16 PROCEDURE — 83605 ASSAY OF LACTIC ACID: CPT | Performed by: STUDENT IN AN ORGANIZED HEALTH CARE EDUCATION/TRAINING PROGRAM

## 2019-09-16 PROCEDURE — 93308 TTE F-UP OR LMTD: CPT

## 2019-09-16 PROCEDURE — 40000986 XR CHEST PORT 1 VW

## 2019-09-16 PROCEDURE — 25000128 H RX IP 250 OP 636: Performed by: NURSE PRACTITIONER

## 2019-09-16 PROCEDURE — 25000125 ZZHC RX 250: Performed by: NURSE ANESTHETIST, CERTIFIED REGISTERED

## 2019-09-16 PROCEDURE — 83735 ASSAY OF MAGNESIUM: CPT | Performed by: NURSE PRACTITIONER

## 2019-09-16 PROCEDURE — 40000275 ZZH STATISTIC RCP TIME EA 10 MIN

## 2019-09-16 PROCEDURE — 93308 TTE F-UP OR LMTD: CPT | Mod: 26 | Performed by: INTERNAL MEDICINE

## 2019-09-16 PROCEDURE — 3E043XZ INTRODUCTION OF VASOPRESSOR INTO CENTRAL VEIN, PERCUTANEOUS APPROACH: ICD-10-PCS | Performed by: ORTHOPAEDIC SURGERY

## 2019-09-16 PROCEDURE — 40000671 ZZH STATISTIC ANESTHESIA CASE

## 2019-09-16 PROCEDURE — 27210222 ZZH KIT SHRLOCK 6FR POWER PICC

## 2019-09-16 PROCEDURE — 87800 DETECT AGNT MULT DNA DIREC: CPT | Performed by: NURSE PRACTITIONER

## 2019-09-16 PROCEDURE — 31500 INSERT EMERGENCY AIRWAY: CPT

## 2019-09-16 PROCEDURE — 25800030 ZZH RX IP 258 OP 636: Performed by: NURSE ANESTHETIST, CERTIFIED REGISTERED

## 2019-09-16 RX ORDER — GABAPENTIN 300 MG/1
300 CAPSULE ORAL 3 TIMES DAILY
Qty: 60 CAPSULE | Refills: 0 | Status: SHIPPED | OUTPATIENT
Start: 2019-09-16 | End: 2019-09-19

## 2019-09-16 RX ORDER — AMOXICILLIN 250 MG
2 CAPSULE ORAL 2 TIMES DAILY
Status: DISCONTINUED | OUTPATIENT
Start: 2019-09-16 | End: 2019-09-26 | Stop reason: HOSPADM

## 2019-09-16 RX ORDER — HEPARIN SODIUM 5000 [USP'U]/.5ML
5000 INJECTION, SOLUTION INTRAVENOUS; SUBCUTANEOUS EVERY 8 HOURS
Status: DISCONTINUED | OUTPATIENT
Start: 2019-09-16 | End: 2019-09-26 | Stop reason: HOSPADM

## 2019-09-16 RX ORDER — ETOMIDATE 2 MG/ML
INJECTION INTRAVENOUS PRN
Status: DISCONTINUED | OUTPATIENT
Start: 2019-09-16 | End: 2019-09-16

## 2019-09-16 RX ORDER — NALOXONE HYDROCHLORIDE 0.4 MG/ML
.1-.4 INJECTION, SOLUTION INTRAMUSCULAR; INTRAVENOUS; SUBCUTANEOUS
Status: DISCONTINUED | OUTPATIENT
Start: 2019-09-16 | End: 2019-09-26 | Stop reason: HOSPADM

## 2019-09-16 RX ORDER — ESCITALOPRAM OXALATE 10 MG/1
10 TABLET ORAL EVERY MORNING
Status: DISCONTINUED | OUTPATIENT
Start: 2019-09-17 | End: 2019-09-16

## 2019-09-16 RX ORDER — AMOXICILLIN 250 MG
1 CAPSULE ORAL 2 TIMES DAILY
Status: DISCONTINUED | OUTPATIENT
Start: 2019-09-16 | End: 2019-09-26 | Stop reason: HOSPADM

## 2019-09-16 RX ORDER — NOREPINEPHRINE BITARTRATE 0.06 MG/ML
0.03-0.4 INJECTION, SOLUTION INTRAVENOUS CONTINUOUS
Status: DISCONTINUED | OUTPATIENT
Start: 2019-09-16 | End: 2019-09-17

## 2019-09-16 RX ORDER — CHLORHEXIDINE GLUCONATE ORAL RINSE 1.2 MG/ML
15 SOLUTION DENTAL EVERY 12 HOURS
Status: DISCONTINUED | OUTPATIENT
Start: 2019-09-16 | End: 2019-09-17

## 2019-09-16 RX ORDER — ASPIRIN 325 MG
325 TABLET ORAL DAILY
Status: DISCONTINUED | OUTPATIENT
Start: 2019-09-17 | End: 2019-09-16

## 2019-09-16 RX ORDER — PROPOFOL 10 MG/ML
10-20 INJECTION, EMULSION INTRAVENOUS EVERY 30 MIN PRN
Status: DISCONTINUED | OUTPATIENT
Start: 2019-09-16 | End: 2019-09-17

## 2019-09-16 RX ORDER — POLYETHYLENE GLYCOL 3350 17 G/17G
17 POWDER, FOR SOLUTION ORAL DAILY
Status: DISCONTINUED | OUTPATIENT
Start: 2019-09-16 | End: 2019-09-16

## 2019-09-16 RX ORDER — PIPERACILLIN SODIUM, TAZOBACTAM SODIUM 4; .5 G/20ML; G/20ML
4.5 INJECTION, POWDER, LYOPHILIZED, FOR SOLUTION INTRAVENOUS EVERY 6 HOURS
Status: DISCONTINUED | OUTPATIENT
Start: 2019-09-16 | End: 2019-09-19

## 2019-09-16 RX ORDER — PROPOFOL 10 MG/ML
5-75 INJECTION, EMULSION INTRAVENOUS CONTINUOUS
Status: DISCONTINUED | OUTPATIENT
Start: 2019-09-16 | End: 2019-09-17

## 2019-09-16 RX ORDER — ACETAMINOPHEN 325 MG/1
650 TABLET ORAL EVERY 4 HOURS PRN
Status: DISCONTINUED | OUTPATIENT
Start: 2019-09-16 | End: 2019-09-20

## 2019-09-16 RX ORDER — PROPOFOL 10 MG/ML
INJECTION, EMULSION INTRAVENOUS
Status: COMPLETED
Start: 2019-09-16 | End: 2019-09-16

## 2019-09-16 RX ADMIN — ESCITALOPRAM OXALATE 10 MG: 10 TABLET ORAL at 08:37

## 2019-09-16 RX ADMIN — HYDROMORPHONE HYDROCHLORIDE 4 MG: 2 TABLET ORAL at 08:38

## 2019-09-16 RX ADMIN — KETOROLAC TROMETHAMINE 30 MG: 30 INJECTION, SOLUTION INTRAMUSCULAR at 00:45

## 2019-09-16 RX ADMIN — SODIUM CHLORIDE, POTASSIUM CHLORIDE, SODIUM LACTATE AND CALCIUM CHLORIDE 1000 ML: 600; 310; 30; 20 INJECTION, SOLUTION INTRAVENOUS at 14:17

## 2019-09-16 RX ADMIN — PROPOFOL 20 MCG/KG/MIN: 10 INJECTION, EMULSION INTRAVENOUS at 16:30

## 2019-09-16 RX ADMIN — LAMOTRIGINE 100 MG: 100 TABLET ORAL at 08:38

## 2019-09-16 RX ADMIN — HYDROMORPHONE HYDROCHLORIDE 4 MG: 2 TABLET ORAL at 11:55

## 2019-09-16 RX ADMIN — VANCOMYCIN HYDROCHLORIDE 1500 MG: 5 INJECTION, POWDER, LYOPHILIZED, FOR SOLUTION INTRAVENOUS at 22:55

## 2019-09-16 RX ADMIN — LISINOPRIL AND HYDROCHLOROTHIAZIDE 1 TABLET: 12.5; 2 TABLET ORAL at 08:38

## 2019-09-16 RX ADMIN — PHENYLEPHRINE HYDROCHLORIDE 100 MCG: 10 INJECTION INTRAVENOUS at 16:32

## 2019-09-16 RX ADMIN — GABAPENTIN 300 MG: 300 CAPSULE ORAL at 08:38

## 2019-09-16 RX ADMIN — PROPOFOL 50 MCG/KG/MIN: 10 INJECTION, EMULSION INTRAVENOUS at 23:35

## 2019-09-16 RX ADMIN — CHLORHEXIDINE GLUCONATE 15 ML: 1.2 RINSE ORAL at 20:24

## 2019-09-16 RX ADMIN — NOREPINEPHRINE BITARTRATE 0.03 MCG/KG/MIN: 1 INJECTION INTRAVENOUS at 14:14

## 2019-09-16 RX ADMIN — HYDROMORPHONE HYDROCHLORIDE 4 MG: 2 TABLET ORAL at 01:49

## 2019-09-16 RX ADMIN — BUPROPION HYDROCHLORIDE 75 MG: 75 TABLET, FILM COATED ORAL at 08:37

## 2019-09-16 RX ADMIN — DIPHENHYDRAMINE HYDROCHLORIDE 25 MG: 25 CAPSULE ORAL at 02:32

## 2019-09-16 RX ADMIN — POLYETHYLENE GLYCOL 3350 17 G: 17 POWDER, FOR SOLUTION ORAL at 08:38

## 2019-09-16 RX ADMIN — SENNOSIDES AND DOCUSATE SODIUM 2 TABLET: 8.6; 5 TABLET ORAL at 08:37

## 2019-09-16 RX ADMIN — KETOROLAC TROMETHAMINE 30 MG: 30 INJECTION, SOLUTION INTRAMUSCULAR at 06:59

## 2019-09-16 RX ADMIN — SODIUM CHLORIDE, POTASSIUM CHLORIDE, SODIUM LACTATE AND CALCIUM CHLORIDE 2000 ML: 600; 310; 30; 20 INJECTION, SOLUTION INTRAVENOUS at 16:11

## 2019-09-16 RX ADMIN — ETOMIDATE 8 MG: 2 INJECTION, SOLUTION INTRAVENOUS at 16:27

## 2019-09-16 RX ADMIN — SODIUM CHLORIDE, POTASSIUM CHLORIDE, SODIUM LACTATE AND CALCIUM CHLORIDE 1000 ML: 600; 310; 30; 20 INJECTION, SOLUTION INTRAVENOUS at 14:00

## 2019-09-16 RX ADMIN — PIPERACILLIN AND TAZOBACTAM 4.5 G: 4; .5 INJECTION, POWDER, FOR SOLUTION INTRAVENOUS at 16:06

## 2019-09-16 RX ADMIN — VANCOMYCIN HYDROCHLORIDE 1500 MG: 5 INJECTION, POWDER, LYOPHILIZED, FOR SOLUTION INTRAVENOUS at 16:05

## 2019-09-16 RX ADMIN — ONDANSETRON 4 MG: 4 TABLET, ORALLY DISINTEGRATING ORAL at 09:17

## 2019-09-16 RX ADMIN — DIPHENHYDRAMINE HYDROCHLORIDE 25 MG: 25 CAPSULE ORAL at 11:55

## 2019-09-16 RX ADMIN — CYCLOBENZAPRINE HYDROCHLORIDE 10 MG: 10 TABLET, FILM COATED ORAL at 00:45

## 2019-09-16 RX ADMIN — HEPARIN SODIUM 5000 UNITS: 5000 INJECTION, SOLUTION INTRAVENOUS; SUBCUTANEOUS at 20:10

## 2019-09-16 RX ADMIN — CYCLOBENZAPRINE HYDROCHLORIDE 10 MG: 10 TABLET, FILM COATED ORAL at 09:15

## 2019-09-16 RX ADMIN — SODIUM CHLORIDE, POTASSIUM CHLORIDE, SODIUM LACTATE AND CALCIUM CHLORIDE 1000 ML: 600; 310; 30; 20 INJECTION, SOLUTION INTRAVENOUS at 14:38

## 2019-09-16 RX ADMIN — PROPOFOL 40 MCG/KG/MIN: 10 INJECTION, EMULSION INTRAVENOUS at 20:09

## 2019-09-16 RX ADMIN — ETOMIDATE 8 MG: 2 INJECTION, SOLUTION INTRAVENOUS at 16:23

## 2019-09-16 RX ADMIN — FLUTICASONE FUROATE AND VILANTEROL TRIFENATATE 1 PUFF: 100; 25 POWDER RESPIRATORY (INHALATION) at 11:55

## 2019-09-16 RX ADMIN — HYDROMORPHONE HYDROCHLORIDE 4 MG: 2 TABLET ORAL at 05:30

## 2019-09-16 RX ADMIN — OXYCODONE HYDROCHLORIDE AND ACETAMINOPHEN 500 MG: 500 TABLET ORAL at 08:37

## 2019-09-16 RX ADMIN — MILNACIPRAN HYDROCHLORIDE 50 MG: 50 TABLET, FILM COATED ORAL at 08:37

## 2019-09-16 RX ADMIN — ASPIRIN 325 MG ORAL TABLET 325 MG: 325 PILL ORAL at 08:37

## 2019-09-16 RX ADMIN — PIPERACILLIN AND TAZOBACTAM 4.5 G: 4; .5 INJECTION, POWDER, FOR SOLUTION INTRAVENOUS at 20:10

## 2019-09-16 RX ADMIN — PHENYLEPHRINE HYDROCHLORIDE 200 MCG: 10 INJECTION INTRAVENOUS at 16:22

## 2019-09-16 RX ADMIN — HYDROCORTISONE SODIUM SUCCINATE 50 MG: 100 INJECTION, POWDER, FOR SOLUTION INTRAMUSCULAR; INTRAVENOUS at 20:10

## 2019-09-16 ASSESSMENT — ACTIVITIES OF DAILY LIVING (ADL)
ADLS_ACUITY_SCORE: 13
ADLS_ACUITY_SCORE: 13
ADLS_ACUITY_SCORE: 14
ADLS_ACUITY_SCORE: 14
ADLS_ACUITY_SCORE: 13
ADLS_ACUITY_SCORE: 13

## 2019-09-16 ASSESSMENT — MIFFLIN-ST. JEOR: SCORE: 2218

## 2019-09-16 NOTE — CODE/RAPID RESPONSE
"[facility]    RRT Note  9/16/2019   Time Called: 1321    Code Status: Full Code    I was called to evaluate Juancho Mohan, who is a 48 year old male who was admitted on 9/12/2019 for left BKA, now POD #4. PMH includes sarcoidosis, melanoma, opiate dependence.    Assessment & Plan   Acute Hypoxic Respiratory Failure   Suspect distributive vs obstructive shock 2/2 ?Surgical site infection vs PE: On initial exam, the patient is lethargic, skin is cold profoundly diaphoretic, RLE mottled, awakes to voice and is alert and oriented x 3 but immediately drifts to sleep. BP 70s/30s-80s/40s,  (SR w RBBB), oral temp 100.2 F, oxygen sats 82-83% on 6L (increased to 87% on 15L oxymask). Per nursing/family, the patient was at baseline until about 10am when nursing noted he was mildly diaphoretic. At this time, he was fortunately asymptomatic and actually reported he felt \"better\" compared to yesterday. He adamantly denies chest pain/pressure, nausea/vomiting, abnormal bleeding, is passing gas, up walking/ambualting until last night. He has been afebrile, denies any urinary symptoms, cough, fever or chills. He endorses some mild dizziness when lying flat. Minimal oral intake today, per chart review he is net negative 6+ L (nursing does not recall the pt urinating today). RLE incision site is erythematous, warm with mild swelling.     DDx: shock (distributive 2/2 obstructive vs hypovolemic); PE? Has some PE criteria on EKG, profoundly hypoxic, PNA seems less likely given his lack of respiratory symptoms; ACS less likely - no EKG changes, trop negative (echo pending);     INTERVENTIONS:  -CMP, CBC, troponin  -CT PE r/o   -CT head w/o contrast (AMS, hx of malignant melanoma)  -sepsis coverage- zosyn/vanco, 4L LR, repeat lactic acid   -ABG  -BiPAP 100% 15/8, adequate volume 500cc, RR ~15-17  -serial trops   -norepinephrine infusion  -bilateral LE ultrasounds   -glucose   -bernard   -EKG (no s wave in lead I, " q in lead III? Has known RBBB)  -intensivist consult   -discontinue lisinopril/HCTZ   -NPO     Discussed with and defer further cares to Dr. Grace (Intensivist) and ortho surgery Cali Martinez PA-C has been updated of change        JAVIER Correia CNP  Hospitalist Service  House Officer  Pager: [phone] (7a - 6p)      Physical Exam   Vital Signs with Ranges:  Temp:  [95.9  F (35.5  C)-100.6  F (38.1  C)] 100.6  F (38.1  C)  Pulse:  [104-147] 131  Heart Rate:  [] 135  Resp:  [16-36] 19  BP: ()/(54-89) 97/65  FiO2 (%):  [100 %] 100 %  SpO2:  [81 %-98 %] 96 %  I/O last 3 completed shifts:  In: 480 [P.O.:480]  Out: 1650 [Urine:1650]    Constitutional: lethargic, alert to voice, cooperative, no apparent distress.  Respiratory: diminished/clear to auscultation bilaterally, no crackles or wheezing. Shallow respirations  Cardiovascular: Irregular rate and regular rhythm, normal S1 and S2, and no murmur noted.  GI: Soft, non-distended, non-tender, normal bowel sounds.  Skin: pale, cold, diaphoretic, RLE mottled   Neurologic: oriented A & O x 3, drifts to sleep quickly,     Data     EKG:  Interpreted by JAVIER Correia CNP  Time reviewed: 1333  Symptoms at time of EKG: hypotension   Rhythm: sinus tachycardia  Rate: 140-150  Axis: normal Axis Deviation  Ectopy: none  Conduction: normal  ST Segments/ T Waves: No ST-T wave changes and No acute ischemic changes  Q Waves: none  Comparison to prior: Unchanged    Clinical Impression: no acute changes and non-specific EKG    ABG:  -  Recent Labs   Lab 09/16/19  1335   PH 7.34*   PCO2 52*   PO2 49*   HCO3 28     Troponin:    Recent Labs   Lab Test 09/16/19  1345   TROPI 0.022     CBC with Diff:  Recent Labs   Lab Test 09/16/19  1345  06/06/12  0853   WBC 6.9   < > 5.3   HGB 9.2*   < > 14.6   MCV 88   < > 81      < > 240   INR  --   --  0.99    < > = values in this interval not displayed.      Lactic Acid:    Lab Results   Component Value Date    LACT  2.7 09/16/2019         Lactate for Sepsis Protocol   Date Value Ref Range Status   09/14/2019 1.3 0.7 - 2.0 mmol/L Final     Comprehensive Metabolic Panel:  Recent Labs   Lab 09/16/19  1345      POTASSIUM 4.1   CHLORIDE 107   CO2 17*   ANIONGAP 16*   GLC 67*   BUN 8   CR 0.73   GFRESTIMATED >90   GFRESTBLACK >90   QUE 7.5*   PROTTOTAL 3.0*   ALBUMIN 1.4*   BILITOTAL 0.5   ALKPHOS 50   AST 20   ALT 14       Time Spent on this Encounter   I spent 60 minutes (1329 - 1421) of critical care time on the unit/floor managing the care of Juancho Mohan. Upon evaluation, this patient had a high probability of imminent or life-threatening deterioration due to acute respiratory failure and hemodynamic instability which required my direct attention, intervention, and personal management. 100% of my time was spent at the bedside counseling the patient and/or coordinating care regarding services listed in this note.

## 2019-09-16 NOTE — PROGRESS NOTES
Rapid response was called, pt was put on 15Litres Oxymask, Arterial Blood gas was drawn and collected.(PH 7.34,PACO2 52, PAO2 49, HCO3 28) Pt Transferred to ICU and currently on BIPAP IPAP14, EPAP 7,BUR 12, [id number]% Sats 96%, Meplix in place for skin Integrity.    Will continue to follow    Miller Radford. RT    09/16/19

## 2019-09-16 NOTE — PROGRESS NOTES
Patient transferred to ICU from 5th floor.  ABG drawn during RRT:  Recent Labs   Lab 09/16/19  1335   PH 7.34*   PCO2 52*   PO2 49*   HCO3 28     Placed on BiPAP 14/7 100% shortly after arriving to ICU.   Settings adjusted: IPAP increased to 15, EPAP increased to 8. SpO2 93%. Discussed with Dr. Grace.  Plan to repeat ABG in 30 to 60 minutes.

## 2019-09-16 NOTE — PLAN OF CARE
Patient is A&O, VSS on RA, CMS intact, regular diet, up with standby assist, and dressing CDI. He is taking anything available for pain, benadryl given for itchiness, and voiding using urinal. Will continue to monitor

## 2019-09-16 NOTE — PROGRESS NOTES
[facility]  Orthopaedics/Foot and Ankle Surgery  Daily Post-Op Note    09/16/2019          Assessment and Plan:    Assessment:   Post-operative day #4  Procedure(s):  LEFT BELOW KNEE AMPUTATION (Left)     No immediate surgical complications identified.  No excessive bleeding  Pain is controlled currently on oral pain meds.      Plan:   1. NWB LLE.  May keep elevated while at rest to limit swelling and pain.  2. Continue with updated pain regimen.  Under appropriate control at this time.  3. PT  4. ASA, SCDs for DVT prophy.  5. Plan d/c home today pending continued pain management.            Interval History:   Stable.  Doing well.  Improving.  Pain is controlled with PO dilaudid, Toradol and flexeril at this time. Patient states he had a flair of pain yesterday following dressing change, that required IV dilaudid.  No fevers. The patient states that he had done well previously with utilization of benadryl at night for sleep. The patient states that he has not had a bowel movement at this time, he denies any N/V or abdominal pain.               Physical Exam:   Blood pressure 129/76, pulse 106, temperature 98.9  F (37.2  C), temperature source Oral, resp. rate 16, height 1.829 m (6'), weight 122 kg (269 lb), SpO2 94 %.  I/O last 3 completed shifts:  In: 480 [P.O.:480]  Out: 1650 [Urine:1650]    Dressing is clean dry and intact. Flow tech brace is off. There is no surrounding erythema, drainage or concern for infection. Popliteal pulse is intact.            Data:   All laboratory data related to this surgery reviewed  Recent Labs   Lab Test 09/12/19  1013 08/11/15  1408 08/10/15  1215 08/01/15  2150 07/29/15  1020   HGB 15.0 12.6* 8.3* 12.0* 12.0*     Recent Labs   Lab Test 06/06/12  0853   INR 0.99      Recent Labs   Lab Test 09/12/19  1013 08/11/15  1408   WBC  --  7.5   PLT  --  225   POTASSIUM 3.9  --    CR 0.96  --

## 2019-09-16 NOTE — PLAN OF CARE
Pt A&O x4. VSS on Ra. Up w/ SBA and crutches. Voiding in urinal overnight. Pain managed w/ prn dilaudid, toradol, and flexeril. CMS intact. Dressing to stump CDI. Itching to stump, prn benadryl given. Plan to discharge to home today. Will continue to monitor.

## 2019-09-16 NOTE — PROGRESS NOTES
"9/15/19, 4PM, [place] [address] [facility] [id number], male, Height: 6' 0\", Weight: 269 lbs 0 oz, Ordered by: Amauri Mccoy MD, Dx: Below knee amputation status, left (H) (Z89.512); Chronic pain (G89.29); , MRN #: [id number].    S:  Patient was seen today at the hospital for the application of a Ishmael-Tech size #1821 rigid removable dressing for the left transtibial limb. Patient presents today as pleasant.  All aspects of the initial evaluation were completed including inspection of the condition of the residual limb and measurements.  Health History & Progression: Patient had a left BK amputation done recently. Patient had chronic pain in foot due to work comp injury that occurred 16 years ago.     O:  Plaster cast was removed to redress outer bandaging.  There was no undue heat, pain, redness, swelling, discharge at this time. The suture line appears healthy and vascularized.      A:  A rigid removable dressing with post op socks was applied to patient's residual limb with suspension belt.  The application procedure was explained in detail to the patient and their caregiver, including the importance of maintaining the proper position of the residual limb in relaxed extension throughout the entire application process.    All supplies were provided to the patient including 2 compression BKA socks grade level 3, Ishmael-Tech Belt, Donning Tube and 4 post-op socks for a fresh rotation to maintain hygiene. Instruction sheets were also provided.  There were no complications with the application of the device and patient acknowledged and signed for delivery of the device.    Patient signed the delivery ticket and was given the [place] receipt information sheet.    Goal:   Provide an environment for protecting the newly amputated residuum post-op with minimal compression until the wound is healed. The compression socks provided will assist in control of fluctuations in " edema and mobilizing residuum scar tissue for preparing for prosthetic device usage after the wound is closed and healed.    P:   Will follow-up with patient as PRN. Patient will be discharged from the hospital at a later time as determined by hospital staff. Hospital staff was made aware of the successful application of the device and all of their questions were answered as well.    Electronically signed by Beto Baez CPO, LPO, MSPO.

## 2019-09-16 NOTE — PHARMACY-VANCOMYCIN DOSING SERVICE
Pharmacy Vancomycin Initial Note  Date of Service 2019  Patient's  1970  48 year old, male    Indication: Sepsis, SSTI    Current estimated CrCl = Estimated Creatinine Clearance: 170.6 mL/min (based on SCr of 0.73 mg/dL).    Creatinine for last 3 days  2019:  1:45 PM Creatinine 0.73 mg/dL           Nephrotoxins and other renal medications (From now, onward)    Start     Dose/Rate Route Frequency Ordered Stop    19 1445  vancomycin 1500 mg in 0.9% NaCl 250 ml intermittent infusion 1,500 mg      1,500 mg  over 90 Minutes Intravenous EVERY 8 HOURS 19 1433      19 1430  piperacillin-tazobactam (ZOSYN) 4.5 g vial to attach to  mL bag      4.5 g  over 30 Minutes Intravenous EVERY 6 HOURS 19 1427      19 1415  norepinephrine (LEVOPHED) 16 mg in  mL infusion      0.03-0.4 mcg/kg/min × 122 kg  3.4-45.8 mL/hr  Intravenous CONTINUOUS 19 1407      19 2332  ketorolac (TORADOL) injection 30 mg      30 mg Intravenous EVERY 6 HOURS PRN 19 2337 19 2331          Contrast Orders - past 72 hours (72h ago, onward)    None                Plan:  1.  Start vancomycin  1500 mg IV q8h.   2.  Goal Trough Level: 15-20 mg/L   3.  Pharmacy will check trough levels as appropriate in 1-3 Days.    4. Serum creatinine levels will be ordered daily for the first week of therapy and at least twice weekly for subsequent weeks.    5. [place] method utilized to dose vancomycin therapy: Method 1    Grace Tellez, RositaD

## 2019-09-16 NOTE — H&P
[facility]    History and Physical  [facility] Intensive Care     Date of Admission:  2019    Assessment & Plan   Juancho Mohan is a 48 year old male who presents with hypotension & respiratory distress on POD 4 following left lower extremity below knee amputation.     Neurology/Pain/Sedation:  #Opioid dependence  #Acute pain post-operatively   #Lethargy   Plan:  Tylenol prn for now  Monitor neurologic status  Low threshold for intubation      Cardiovascular:  #Hypotension/Shock - distributive vs. Septic  EKG showing S wave in lead 1, Q in lead II with RBBB r/o PE; on ASA 325mg for DVT ppx   ECHO bedside showed normal RV function with trace tricusp regurg (official final reading pending)  Febrile at bedside Tm 38.8F, lactate 2.7, stump warm and erythematous r/o septic shock  Plan:  F/up ECHO report   30cc/kg IVF bolus given with improvement in MAP  PICC line for central access & antibiotics.   LE duplex ordered  Blood cultures ordered & sent; broad spectrum antibiotics started  Discontinue aspirin 325-mg  Will start subcutaneous heparin   Continuous cardiac monitoring     Pulmonary:        #Acute hypoxemic respiratory failure    #Reported hx of RODRIGO  #Obesity   Came on BiPAP with O2 Sat % on 100% FiO2  Repeat AB.33/51/107/27 on 100% FiO2  Plan:  Monitor mental status, low threshold for intubation   B/L LE duplex   Possible CT PE protocol if no improvement in hemodynamics     Addendum:  Patient unresponsive with normal MAP & Sats in 90s around 16:30h.   GCS 7/16 (E1V1M4)  Intubated by anesthesia      Renal:  #Acute Kidney Injury   BUN/Cr 16/1.77  UO 650cc upon bernard insertion; -6L hospital during stay   K+ 4.4  Ca 8.5  No LE edema noted in right LE   Plan:  Avoid nephrotoxic drugs  Monitor I/Os  Bernard for accurate output monitoring      ID:   Febrile, elevated lactate, somnolent, low normal WBC (88% PMNs)  Plan:  Vanco/Zosyn following blood/urine  cultures  Ortho  evaluation of LE wound      GI/Nutrition:  #NPO status for now   Plan:  Bowel regimen      Endocrine:  Normoglycemia  Plan:  Monitor glucose levels (Goal 140-180)     Heme/Onc:  #Mild Anemia 9.2/27.7  Plt 195  Plan:   Monitor H/H   DVT ppx w/ Heparin SQ     Other:  PT/OT - daily rehab     DVT Prophylaxis: Heparin subcutaneous  GI Prophylaxis: Not indicated    Restraints: Restraints for medical healing needed: Not Applicable    Family update by me today: Yes     Davila Efrenstanislavjan Borrero      Code Status   Full Code    Primary Care Physician   [facility]    Chief Complaint   Difficulty breathining    History is obtained from the patient    History of Present Illness   Juancho Mohan is a 48 year old male who presents with from surgical floor after being found to be lethargic, cold and diaphoretic. He had a left BKA for chronic pain/infection by orthopedic surgery group. His initial vitals on the floor were BP 70s/30s-80s/40s,  (SR w RBBB), oral temp 100.2 F, oxygen sats 82-83% on 6L (increased to 87% on 15L oxymask). Per nursing/family, the patient was at baseline until about 10am when nursing noted that he was mildly diaphoretic. At this time, he denied symptoms. Upon arrival to unit, he was on BiPAP machine and denied chest pain/pressure, nausea/vomiting, abdominal pain, or significant pain in left lower extremity. He has been afebrile post-op, denies any urinary symptoms, cough, fever or chills. He had minimal oral intake today and is approximately 6L net negative since his operation.     Past Medical History    I have reviewed this patient's medical history and updated it with pertinent information if needed.   Past Medical History:   Diagnosis Date     Alcohol abuse, episodic      Attention deficit disorder with hyperactivity(314.01)      Congenital spondylolisthesis     L5-S1     Depressive disorder, not elsewhere classified      Dyspnea on exertion      Essential hypertension,  benign      Gastro-oesophageal reflux disease      Generalized anxiety disorder      Migraine, unspecified, with intractable migraine, so stated, without mention of status migrainosus 8/01/05    acts like a stroke, hospitalized, saw neurology     Pneumonia, organism unspecified(486) 09/21/08    Admit. Discharged 09/22/08-[facility]     Reflux esophagitis      Renal disease     kidney stone     RSD (reflex sympathetic dystrophy)      Sarcoidoses 6/27/12    EBUS- FNA. Unable to do special stains     Sleep apnea 6/11/2009    no c pap usage       Past Surgical History   I have reviewed this patient's surgical history and updated it with pertinent information if needed.  Past Surgical History:   Procedure Laterality Date     AMPUTATE LEG BELOW KNEE Left 9/12/2019    Procedure: LEFT BELOW KNEE AMPUTATION;  Surgeon: Amauri Mccoy MD;  Location:  OR     ARTHRODESIS ANKLE  12/31/2013    Procedure: ARTHRODESIS ANKLE;  LEFT SAPHENOUS NERVE BLOCK, LEFT REVISION SUBTALAR FUSION, POSTERIOR BONE BLOCK, TENDOACHILLES LENGTHENING, PLANTAR CALCANEAL EXCOSTECTOMY, LEFT SURAL NEURECTOMY  (FEMORAL HEAD ALLOGRAFT, ALLOSTEM STRIPS, SYNTHES 6.5 MM HEADLESS COMPRESSION SCREWS) ;  Surgeon: Amauri Mccoy MD;  Location:  OR     ARTHROSCOPY ANKLE Left 7/15/2015    Procedure: ARTHROSCOPY ANKLE;  Surgeon: Amauri Mccoy MD;  Location:  OR     BRONCHOSCOPY FLEXIBLE  6/27/2012    Procedure: BRONCHOSCOPY FLEXIBLE;  Flexible Bronchoscopy, Endobronchial Ultrasound with Biopsies;  Surgeon: Justice Dos Santos MD;  Location: [facility] OR     C LUMBAR SPINE FUSION,ANTER APPRCH  12/00     C NONSPECIFIC PROCEDURE  2/03    Multiple ortho injuries after 38 foot fall - Fx x 3 right foot, Tibial plateau fx - with ORIF; Left heel fx - plate/screws.     EXCISE EXOSTOSIS FOOT  12/31/2013    Procedure: EXCISE EXOSTOSIS FOOT;  PLANTAR CALCANEOUS EXOSTECOMY;  Surgeon: Amauri Mccoy MD;  Location:  OR     EXERCISE  STRESS TEST NL  Jan 2010    normal     GRAFT BONE FROM ILIAC CREST  12/31/2013    Procedure: GRAFT BONE FROM ILIAC CREST;  BONE MARROW  ASPIRATION FROM RIGHTILIAC CREST;  Surgeon: Amauri Mccoy MD;  Location: [facility] OR     HC ARTHROTOMY/EXPLORE/TREAT KNEE JOINT      x3 left; x1 right     HC REMOVAL DEEP IMPLANT  6/16/2009    Left foot. Open wedge osteotomy through the calcaneus. Fusion of left calcaneocuboid joint.     HC REPAIR OF NASAL SEPTUM  12/02/08    [facility]     IRRIGATION AND DEBRIDEMENT FOOT, COMBINED Left 7/15/2015    Procedure: COMBINED IRRIGATION AND DEBRIDEMENT FOOT;  Surgeon: Amauri Mccoy MD;  Location:  OR     IRRIGATION AND DEBRIDEMENT LOWER EXTREMITY, COMBINED Left 7/10/2015    Procedure: COMBINED IRRIGATION AND DEBRIDEMENT LOWER EXTREMITY;  Surgeon: Kirt Godwin MD;  Location: [facility] OR     LENGTHEN TENDON ACHILLES  12/31/2013    Procedure: LENGTHEN TENDON ACHILLES;;  Surgeon: Amauri Mccoy MD;  Location:  OR       Prior to Admission Medications   Prior to Admission Medications   Prescriptions Last Dose Informant Patient Reported? Taking?   IMITREX 20 MG/ACT nasal spray 8/29/2019 at prn Self No Yes   Sig: SPRAY 1 SPRAY IN NOSTRIL AS NEEDED FOR MIGRAINE (MAY REPEAT AFTER 2 HRS IF NEEDED, MAX 2 SPRAYS IN 24 HRS.).   ORDER FOR DME  Self No No   Sig: Equipment being ordered: Walker Wheels (), Walker () and Crutches ()  Treatment Diagnosis: decreased functional mobility   ORDER FOR DME  Self No No   Sig: Equipment being ordered: Walker Wheels (), Walker () and Crutches ()  Treatment Diagnosis: Decreased functional mobility   acetaminophen (TYLENOL) 325 MG tablet 8/22/2019 at prn Self No Yes   Sig: Take 2 tablets (650 mg) by mouth every 4 hours as needed   albuterol (PROAIR HFA/PROVENTIL HFA/VENTOLIN HFA) 108 (90 Base) MCG/ACT inhaler 9/10/2019 at prn Self Yes Yes   Sig: Inhale 2 puffs into the lungs 4 times daily as needed for  shortness of breath / dyspnea or wheezing   buPROPion (WELLBUTRIN) 75 MG tablet 9/11/2019 at 2130 Self Yes Yes   Sig: Take 75 mg by mouth 2 times daily   escitalopram (LEXAPRO) 10 MG tablet 9/11/2019 at 2130 Self Yes Yes   Sig: Take 10 mg by mouth every morning   fluticasone-vilanterol (BREO ELLIPTA) 100-25 MCG/INH inhaler 9/12/2019 at 0700 Self Yes Yes   Sig: Inhale 1 puff into the lungs daily   lamoTRIgine (LAMICTAL) 200 MG tablet 9/11/2019 at 2130 Self Yes Yes   Sig: Take 100 mg by mouth 2 times daily    lisinopril-hydrochlorothiazide (PRINZIDE/ZESTORETIC) 20-12.5 MG tablet 9/11/2019 at 2130 Self Yes Yes   Sig: Take 1 tablet by mouth 2 times daily   milnacipran (SAVELLA) 50 MG TABS 9/11/2019 at 2130 Self Yes Yes   Sig: Take 50 mg by mouth 2 times daily.   ondansetron (ZOFRAN-ODT) 8 MG ODT tab 9/5/2019 at prn Self Yes Yes   Sig: Take 8 mg by mouth every 8 hours as needed for nausea   pravastatin (PRAVACHOL) 10 MG tablet 9/11/2019 at 2130 Self Yes Yes   Sig: Take 10 mg by mouth every evening    vitamin C (ASCORBIC ACID) 1000 MG TABS 9/11/2019 at 2130 Self Yes Yes   Sig: Take 1,000 mg by mouth daily      Facility-Administered Medications: None     Allergies   Allergies   Allergen Reactions     Lansoprazole      prevacid-arm rash     Steri Strips      blisters       Social History   I have reviewed this patient's social history and updated it with pertinent information if needed. Juancho Mohan  reports that he has never smoked. His smokeless tobacco use includes chew. He reports that he has current or past drug history. Drugs: Methamphetamines and Cocaine. Frequency: 2.00 times per week. He reports that he does not drink alcohol.    Family History   I have reviewed this patient's family history and updated it with pertinent information if needed.   Family History   Problem Relation Age of Onset     Diabetes Mother         age 50s     Hypertension Mother      C.A.D. Mother         age 50s     Cerebrovascular  Disease Mother      Heart Disease Mother      Obesity Mother      Diabetes Father      Obesity Sister        Review of Systems   A Full review of systems not obtained due to patient factors:  He denies chest pain, abdominal pain, nausea/vomiting.     Physical Exam   Temp: 101.7  F (38.7  C) Temp src: Bladder Temp  Min: 95.9  F (35.5  C)  Max: 101.7  F (38.7  C) BP: (!) 87/72 Pulse: 129 Heart Rate: 126 Resp: (!) 7 SpO2: 92 % O2 Device: BiPAP/CPAP Oxygen Delivery: 15 LPM  Vital Signs with Ranges  Temp:  [95.9  F (35.5  C)-101.7  F (38.7  C)] 101.7  F (38.7  C)  Pulse:  [104-145] 129  Heart Rate:  [] 126  Resp:  [7-36] 7  BP: ()/(54-99) 87/72  FiO2 (%):  [100 %] 100 %  SpO2:  [81 %-99 %] 92 %  269 lbs 0 oz    Constitutional: patient is oriented x 3 but drowsy, once stimulated, he appear coherent fatigued  Eyes: Lids and lashes normal, pupils equal, round and reactive to light, extra ocular muscles intact, sclera clear, conjunctiva normal  ENT: Normocephalic, without obvious abnormality, atraumatic, sinuses nontender on palpation, external ears without lesions, oral pharynx with moist mucous membranes, tonsils without erythema or exudates, gums normal and good dentition.  Hematologic / Lymphatic: no cervical lymphadenopathy and no supraclavicular lymphadenopathy  Respiratory: tachypneic on BiPAP but tolerating; chest clear bilaterally  Cardiovascular: tachycardic with regular rhythm, normal S1 and S2 and no murmur noted  GI: No scars, normal bowel sounds, soft, non-distended, non-tender, no masses palpated, no hepatosplenomegally  Genitounirinary: phallus meatus circumcised  Skin: mottled, cold and clammy skin in all extremities except BKA stump which is warm to touch and erythematous   Musculoskeletal: there is some redness, warmth, or swelling of the BKA stump; he denies significant pain with palpation     Data   Results for orders placed or performed during the hospital encounter of 09/12/19 (from the  past 24 hour(s))   Lactic acid whole blood   Result Value Ref Range    Lactic Acid 2.7 (H) 0.7 - 2.0 mmol/L   EKG 12-lead, tracing only   Result Value Ref Range    Interpretation ECG Click View Image link to view waveform and result    Blood gas arterial with oxyhemoglobin (1200)   Result Value Ref Range    pH Arterial 7.34 (L) 7.35 - 7.45 pH    pCO2 Arterial 52 (H) 35 - 45 mm Hg    pO2 Arterial 49 (L) 80 - 105 mm Hg    Bicarbonate Arterial 28 21 - 28 mmol/L    Oxyhemoglobin Arterial 81 (L) 92 - 100 %    Base Excess Art 0.8 mmol/L   Glucose by meter   Result Value Ref Range    Glucose 125 (H) 70 - 99 mg/dL   CBC with platelets differential   Result Value Ref Range    WBC 6.9 4.0 - 11.0 10e9/L    RBC Count 3.16 (L) 4.4 - 5.9 10e12/L    Hemoglobin 9.2 (L) 13.3 - 17.7 g/dL    Hematocrit 27.7 (L) 40.0 - 53.0 %    MCV 88 78 - 100 fl    MCH 29.1 26.5 - 33.0 pg    MCHC 33.2 31.5 - 36.5 g/dL    RDW 12.8 10.0 - 15.0 %    Platelet Count 195 150 - 450 10e9/L    Diff Method Automated Method     % Neutrophils 88.1 %    % Lymphocytes 3.2 %    % Monocytes 3.6 %    % Eosinophils 4.1 %    % Basophils 0.4 %    % Immature Granulocytes 0.6 %    Nucleated RBCs 0 0 /100    Absolute Neutrophil 6.1 1.6 - 8.3 10e9/L    Absolute Lymphocytes 0.2 (L) 0.8 - 5.3 10e9/L    Absolute Monocytes 0.3 0.0 - 1.3 10e9/L    Absolute Eosinophils 0.3 0.0 - 0.7 10e9/L    Absolute Basophils 0.0 0.0 - 0.2 10e9/L    Abs Immature Granulocytes 0.0 0 - 0.4 10e9/L    Absolute Nucleated RBC 0.0    Troponin I   Result Value Ref Range    Troponin I ES 0.022 0.000 - 0.045 ug/L   Comprehensive metabolic panel   Result Value Ref Range    Sodium 140 133 - 144 mmol/L    Potassium 4.1 3.4 - 5.3 mmol/L    Chloride 107 94 - 109 mmol/L    Carbon Dioxide 17 (L) 20 - 32 mmol/L    Anion Gap 16 (H) 3 - 14 mmol/L    Glucose 67 (L) 70 - 99 mg/dL    Urea Nitrogen 8 7 - 30 mg/dL    Creatinine 0.73 0.66 - 1.25 mg/dL    GFR Estimate >90 >60 mL/min/[1.73_m2]    GFR Estimate If Black >90  >60 mL/min/[1.73_m2]    Calcium 7.5 (L) 8.5 - 10.1 mg/dL    Bilirubin Total 0.5 0.2 - 1.3 mg/dL    Albumin 1.4 (L) 3.4 - 5.0 g/dL    Protein Total 3.0 (L) 6.8 - 8.8 g/dL    Alkaline Phosphatase 50 40 - 150 U/L    ALT 14 0 - 70 U/L    AST 20 0 - 45 U/L   Magnesium   Result Value Ref Range    Magnesium 0.7 (LL) 1.6 - 2.3 mg/dL   Phosphorus   Result Value Ref Range    Phosphorus 1.3 (L) 2.5 - 4.5 mg/dL   Glucose by meter   Result Value Ref Range    Glucose 100 (H) 70 - 99 mg/dL

## 2019-09-16 NOTE — PLAN OF CARE
Pt mildly diaphoretic this am but asymptomatic otherwise, around 1300 wife came out to desk and stated that she just got here and pt was very lethargic. Assessment showed pt to be extremely diaphoretic, lethargic and 74% on room air, pt was denying SOB or chest pain. Orders for lactic acid were put in STAT and RRT was called.

## 2019-09-16 NOTE — PLAN OF CARE
Patient decompensating on Station 55.  Brought  to ICU around 1400 with increased work of breathing, sweating/clammy/diaphoretic, and tachycardia and hypotension.  Patient worked up for sepsis.  5L LR given. Vancomycin and Zosyn given. Norepi initiated and remains at 0.13.  Intubated around 1630 and remains on low dose of propofol.  Lungs course. Patient not responsive to stimuli. Bilateral wrist restraints on for prevention of removal of ET tube. PICC placed. Fitzgerald placed with immediately 650cc output.

## 2019-09-17 ENCOUNTER — APPOINTMENT (OUTPATIENT)
Dept: CT IMAGING | Facility: CLINIC | Age: 49
DRG: 040 | End: 2019-09-17
Attending: NURSE PRACTITIONER
Payer: OTHER MISCELLANEOUS

## 2019-09-17 ENCOUNTER — APPOINTMENT (OUTPATIENT)
Dept: GENERAL RADIOLOGY | Facility: CLINIC | Age: 49
DRG: 040 | End: 2019-09-17
Attending: INTERNAL MEDICINE
Payer: OTHER MISCELLANEOUS

## 2019-09-17 ENCOUNTER — APPOINTMENT (OUTPATIENT)
Dept: CT IMAGING | Facility: CLINIC | Age: 49
DRG: 040 | End: 2019-09-17
Attending: ORTHOPAEDIC SURGERY
Payer: OTHER MISCELLANEOUS

## 2019-09-17 PROBLEM — N17.9 ACUTE KIDNEY FAILURE, UNSPECIFIED (H): Status: ACTIVE | Noted: 2019-09-17

## 2019-09-17 LAB
ALBUMIN SERPL-MCNC: 2.4 G/DL (ref 3.4–5)
ALP SERPL-CCNC: 73 U/L (ref 40–150)
ALT SERPL W P-5'-P-CCNC: 33 U/L (ref 0–70)
ANION GAP SERPL CALCULATED.3IONS-SCNC: 6 MMOL/L (ref 3–14)
AST SERPL W P-5'-P-CCNC: 46 U/L (ref 0–45)
BILIRUB SERPL-MCNC: 1 MG/DL (ref 0.2–1.3)
BUN SERPL-MCNC: 18 MG/DL (ref 7–30)
CALCIUM SERPL-MCNC: 7.6 MG/DL (ref 8.5–10.1)
CHLORIDE SERPL-SCNC: 102 MMOL/L (ref 94–109)
CO2 SERPL-SCNC: 30 MMOL/L (ref 20–32)
CREAT SERPL-MCNC: 1.32 MG/DL (ref 0.66–1.25)
ERYTHROCYTE [DISTWIDTH] IN BLOOD BY AUTOMATED COUNT: 13.3 % (ref 10–15)
GFR SERPL CREATININE-BSD FRML MDRD: 64 ML/MIN/{1.73_M2}
GLUCOSE BLDC GLUCOMTR-MCNC: 109 MG/DL (ref 70–99)
GLUCOSE BLDC GLUCOMTR-MCNC: 148 MG/DL (ref 70–99)
GLUCOSE SERPL-MCNC: 150 MG/DL (ref 70–99)
HCT VFR BLD AUTO: 35.7 % (ref 40–53)
HGB BLD-MCNC: 12 G/DL (ref 13.3–17.7)
LACTATE BLD-SCNC: 1.7 MMOL/L (ref 0.7–2)
LACTATE BLD-SCNC: 2 MMOL/L (ref 0.7–2)
MAGNESIUM SERPL-MCNC: 2 MG/DL (ref 1.6–2.3)
MCH RBC QN AUTO: 29.2 PG (ref 26.5–33)
MCHC RBC AUTO-ENTMCNC: 33.6 G/DL (ref 31.5–36.5)
MCV RBC AUTO: 87 FL (ref 78–100)
PHOSPHATE SERPL-MCNC: 4.2 MG/DL (ref 2.5–4.5)
PLATELET # BLD AUTO: 231 10E9/L (ref 150–450)
POTASSIUM SERPL-SCNC: 4.6 MMOL/L (ref 3.4–5.3)
PROT SERPL-MCNC: 5.9 G/DL (ref 6.8–8.8)
RBC # BLD AUTO: 4.11 10E12/L (ref 4.4–5.9)
SODIUM SERPL-SCNC: 138 MMOL/L (ref 133–144)
VANCOMYCIN SERPL-MCNC: 16 MG/L
WBC # BLD AUTO: 9.6 10E9/L (ref 4–11)

## 2019-09-17 PROCEDURE — 25000128 H RX IP 250 OP 636: Performed by: ORTHOPAEDIC SURGERY

## 2019-09-17 PROCEDURE — 25800030 ZZH RX IP 258 OP 636: Performed by: ORTHOPAEDIC SURGERY

## 2019-09-17 PROCEDURE — 25000128 H RX IP 250 OP 636: Performed by: PHYSICIAN ASSISTANT

## 2019-09-17 PROCEDURE — 40000008 ZZH STATISTIC AIRWAY CARE

## 2019-09-17 PROCEDURE — 40000281 ZZH STATISTIC TRANSPORT TIME EA 15 MIN

## 2019-09-17 PROCEDURE — 25800030 ZZH RX IP 258 OP 636: Performed by: INTERNAL MEDICINE

## 2019-09-17 PROCEDURE — 99291 CRITICAL CARE FIRST HOUR: CPT | Mod: GC | Performed by: INTERNAL MEDICINE

## 2019-09-17 PROCEDURE — 85027 COMPLETE CBC AUTOMATED: CPT | Performed by: STUDENT IN AN ORGANIZED HEALTH CARE EDUCATION/TRAINING PROGRAM

## 2019-09-17 PROCEDURE — 25000125 ZZHC RX 250: Performed by: INTERNAL MEDICINE

## 2019-09-17 PROCEDURE — 12000000 ZZH R&B MED SURG/OB

## 2019-09-17 PROCEDURE — 25000132 ZZH RX MED GY IP 250 OP 250 PS 637: Performed by: ORTHOPAEDIC SURGERY

## 2019-09-17 PROCEDURE — 00000146 ZZHCL STATISTIC GLUCOSE BY METER IP

## 2019-09-17 PROCEDURE — 71045 X-RAY EXAM CHEST 1 VIEW: CPT

## 2019-09-17 PROCEDURE — 40000239 ZZH STATISTIC VAT ROUNDS

## 2019-09-17 PROCEDURE — 25000128 H RX IP 250 OP 636: Performed by: INTERNAL MEDICINE

## 2019-09-17 PROCEDURE — 87040 BLOOD CULTURE FOR BACTERIA: CPT | Performed by: HOSPITALIST

## 2019-09-17 PROCEDURE — 12000047 ZZH R&B IMCU

## 2019-09-17 PROCEDURE — 99223 1ST HOSP IP/OBS HIGH 75: CPT | Performed by: HOSPITALIST

## 2019-09-17 PROCEDURE — 99207 ZZC CONSULT E&M CHANGED TO INITIAL LEVEL: CPT | Performed by: HOSPITALIST

## 2019-09-17 PROCEDURE — 80202 ASSAY OF VANCOMYCIN: CPT | Performed by: ORTHOPAEDIC SURGERY

## 2019-09-17 PROCEDURE — 80053 COMPREHEN METABOLIC PANEL: CPT | Performed by: STUDENT IN AN ORGANIZED HEALTH CARE EDUCATION/TRAINING PROGRAM

## 2019-09-17 PROCEDURE — 25000128 H RX IP 250 OP 636: Performed by: STUDENT IN AN ORGANIZED HEALTH CARE EDUCATION/TRAINING PROGRAM

## 2019-09-17 PROCEDURE — 71275 CT ANGIOGRAPHY CHEST: CPT

## 2019-09-17 PROCEDURE — 25800030 ZZH RX IP 258 OP 636: Performed by: NURSE PRACTITIONER

## 2019-09-17 PROCEDURE — 25000132 ZZH RX MED GY IP 250 OP 250 PS 637: Performed by: HOSPITALIST

## 2019-09-17 PROCEDURE — 74177 CT ABD & PELVIS W/CONTRAST: CPT

## 2019-09-17 PROCEDURE — 25000125 ZZHC RX 250: Performed by: ORTHOPAEDIC SURGERY

## 2019-09-17 PROCEDURE — 83735 ASSAY OF MAGNESIUM: CPT | Performed by: STUDENT IN AN ORGANIZED HEALTH CARE EDUCATION/TRAINING PROGRAM

## 2019-09-17 PROCEDURE — 25000128 H RX IP 250 OP 636: Performed by: NURSE PRACTITIONER

## 2019-09-17 PROCEDURE — 84100 ASSAY OF PHOSPHORUS: CPT | Performed by: STUDENT IN AN ORGANIZED HEALTH CARE EDUCATION/TRAINING PROGRAM

## 2019-09-17 PROCEDURE — 70450 CT HEAD/BRAIN W/O DYE: CPT

## 2019-09-17 PROCEDURE — 36415 COLL VENOUS BLD VENIPUNCTURE: CPT | Performed by: HOSPITALIST

## 2019-09-17 PROCEDURE — 83605 ASSAY OF LACTIC ACID: CPT | Performed by: STUDENT IN AN ORGANIZED HEALTH CARE EDUCATION/TRAINING PROGRAM

## 2019-09-17 PROCEDURE — 94003 VENT MGMT INPAT SUBQ DAY: CPT

## 2019-09-17 PROCEDURE — 25000132 ZZH RX MED GY IP 250 OP 250 PS 637: Performed by: STUDENT IN AN ORGANIZED HEALTH CARE EDUCATION/TRAINING PROGRAM

## 2019-09-17 PROCEDURE — 40000275 ZZH STATISTIC RCP TIME EA 10 MIN

## 2019-09-17 PROCEDURE — 73700 CT LOWER EXTREMITY W/O DYE: CPT | Mod: LT

## 2019-09-17 PROCEDURE — 40000986 XR ABDOMEN PORT 1 VW

## 2019-09-17 RX ORDER — LIDOCAINE 40 MG/G
CREAM TOPICAL
Status: DISCONTINUED | OUTPATIENT
Start: 2019-09-17 | End: 2019-09-23 | Stop reason: CLARIF

## 2019-09-17 RX ORDER — BUPROPION HYDROCHLORIDE 75 MG/1
75 TABLET ORAL 2 TIMES DAILY
Status: DISCONTINUED | OUTPATIENT
Start: 2019-09-17 | End: 2019-09-17

## 2019-09-17 RX ORDER — PRAVASTATIN SODIUM 10 MG
10 TABLET ORAL EVERY EVENING
Status: DISCONTINUED | OUTPATIENT
Start: 2019-09-17 | End: 2019-09-17

## 2019-09-17 RX ORDER — BUPROPION HYDROCHLORIDE 75 MG/1
75 TABLET ORAL 2 TIMES DAILY
Status: DISCONTINUED | OUTPATIENT
Start: 2019-09-17 | End: 2019-09-26 | Stop reason: HOSPADM

## 2019-09-17 RX ORDER — LAMOTRIGINE 100 MG/1
100 TABLET ORAL 2 TIMES DAILY
Status: DISCONTINUED | OUTPATIENT
Start: 2019-09-17 | End: 2019-09-26 | Stop reason: HOSPADM

## 2019-09-17 RX ORDER — IOPAMIDOL 755 MG/ML
83 INJECTION, SOLUTION INTRAVASCULAR ONCE
Status: COMPLETED | OUTPATIENT
Start: 2019-09-17 | End: 2019-09-17

## 2019-09-17 RX ORDER — NITROGLYCERIN 0.4 MG/1
0.4 TABLET SUBLINGUAL EVERY 5 MIN PRN
Status: DISCONTINUED | OUTPATIENT
Start: 2019-09-17 | End: 2019-09-20

## 2019-09-17 RX ORDER — ESCITALOPRAM OXALATE 10 MG/1
10 TABLET ORAL DAILY
Status: DISCONTINUED | OUTPATIENT
Start: 2019-09-17 | End: 2019-09-17

## 2019-09-17 RX ORDER — PRAVASTATIN SODIUM 10 MG
10 TABLET ORAL EVERY EVENING
Status: DISCONTINUED | OUTPATIENT
Start: 2019-09-17 | End: 2019-09-26 | Stop reason: HOSPADM

## 2019-09-17 RX ORDER — POLYETHYLENE GLYCOL 3350 17 G/17G
17 POWDER, FOR SOLUTION ORAL 2 TIMES DAILY PRN
Status: DISCONTINUED | OUTPATIENT
Start: 2019-09-17 | End: 2019-09-26 | Stop reason: HOSPADM

## 2019-09-17 RX ORDER — HYDRALAZINE HYDROCHLORIDE 20 MG/ML
10 INJECTION INTRAMUSCULAR; INTRAVENOUS EVERY 4 HOURS PRN
Status: DISCONTINUED | OUTPATIENT
Start: 2019-09-17 | End: 2019-09-26 | Stop reason: HOSPADM

## 2019-09-17 RX ORDER — SODIUM CHLORIDE 9 MG/ML
INJECTION, SOLUTION INTRAVENOUS CONTINUOUS
Status: ACTIVE | OUTPATIENT
Start: 2019-09-17 | End: 2019-09-18

## 2019-09-17 RX ORDER — ESCITALOPRAM OXALATE 5 MG/1
10 TABLET ORAL EVERY MORNING
Status: DISCONTINUED | OUTPATIENT
Start: 2019-09-18 | End: 2019-09-26 | Stop reason: HOSPADM

## 2019-09-17 RX ADMIN — HEPARIN SODIUM 5000 UNITS: 5000 INJECTION, SOLUTION INTRAVENOUS; SUBCUTANEOUS at 04:18

## 2019-09-17 RX ADMIN — PRAVASTATIN SODIUM 10 MG: 10 TABLET ORAL at 21:26

## 2019-09-17 RX ADMIN — HYDROMORPHONE HYDROCHLORIDE 0.5 MG: 1 INJECTION, SOLUTION INTRAMUSCULAR; INTRAVENOUS; SUBCUTANEOUS at 04:25

## 2019-09-17 RX ADMIN — LAMOTRIGINE 100 MG: 100 TABLET ORAL at 21:26

## 2019-09-17 RX ADMIN — THIAMINE HYDROCHLORIDE 200 MG: 100 INJECTION, SOLUTION INTRAMUSCULAR; INTRAVENOUS at 11:48

## 2019-09-17 RX ADMIN — HYDROCORTISONE SODIUM SUCCINATE 50 MG: 100 INJECTION, POWDER, FOR SOLUTION INTRAMUSCULAR; INTRAVENOUS at 17:35

## 2019-09-17 RX ADMIN — THIAMINE HYDROCHLORIDE 200 MG: 100 INJECTION, SOLUTION INTRAMUSCULAR; INTRAVENOUS at 21:23

## 2019-09-17 RX ADMIN — FLUTICASONE FUROATE AND VILANTEROL TRIFENATATE 1 PUFF: 100; 25 POWDER RESPIRATORY (INHALATION) at 11:43

## 2019-09-17 RX ADMIN — HYDROMORPHONE HYDROCHLORIDE 2 MG: 2 TABLET ORAL at 14:23

## 2019-09-17 RX ADMIN — HYDROCORTISONE SODIUM SUCCINATE 50 MG: 100 INJECTION, POWDER, FOR SOLUTION INTRAMUSCULAR; INTRAVENOUS at 01:28

## 2019-09-17 RX ADMIN — ASCORBIC ACID 1500 MG: 500 INJECTION, SOLUTION INTRAMUSCULAR; INTRAVENOUS; SUBCUTANEOUS at 07:03

## 2019-09-17 RX ADMIN — HYDROMORPHONE HYDROCHLORIDE 4 MG: 2 TABLET ORAL at 11:34

## 2019-09-17 RX ADMIN — HYDROMORPHONE HYDROCHLORIDE 0.5 MG: 1 INJECTION, SOLUTION INTRAMUSCULAR; INTRAVENOUS; SUBCUTANEOUS at 09:02

## 2019-09-17 RX ADMIN — PROPOFOL 50 MCG/KG/MIN: 10 INJECTION, EMULSION INTRAVENOUS at 07:34

## 2019-09-17 RX ADMIN — SODIUM CHLORIDE 90 ML: 9 INJECTION, SOLUTION INTRAVENOUS at 05:06

## 2019-09-17 RX ADMIN — HYDROCORTISONE SODIUM SUCCINATE 50 MG: 100 INJECTION, POWDER, FOR SOLUTION INTRAMUSCULAR; INTRAVENOUS at 05:58

## 2019-09-17 RX ADMIN — PROPOFOL 50 MCG/KG/MIN: 10 INJECTION, EMULSION INTRAVENOUS at 05:15

## 2019-09-17 RX ADMIN — PIPERACILLIN AND TAZOBACTAM 4.5 G: 4; .5 INJECTION, POWDER, FOR SOLUTION INTRAVENOUS at 02:27

## 2019-09-17 RX ADMIN — ACETAMINOPHEN 650 MG: 325 TABLET, FILM COATED ORAL at 18:51

## 2019-09-17 RX ADMIN — IOPAMIDOL 83 ML: 755 INJECTION, SOLUTION INTRAVENOUS at 05:06

## 2019-09-17 RX ADMIN — PIPERACILLIN AND TAZOBACTAM 4.5 G: 4; .5 INJECTION, POWDER, FOR SOLUTION INTRAVENOUS at 13:38

## 2019-09-17 RX ADMIN — HYDROCORTISONE SODIUM SUCCINATE 50 MG: 100 INJECTION, POWDER, FOR SOLUTION INTRAMUSCULAR; INTRAVENOUS at 11:39

## 2019-09-17 RX ADMIN — CYCLOBENZAPRINE HYDROCHLORIDE 10 MG: 10 TABLET, FILM COATED ORAL at 21:47

## 2019-09-17 RX ADMIN — BUPROPION HYDROCHLORIDE 75 MG: 75 TABLET, FILM COATED ORAL at 21:26

## 2019-09-17 RX ADMIN — HYDROMORPHONE HYDROCHLORIDE 4 MG: 2 TABLET ORAL at 18:51

## 2019-09-17 RX ADMIN — ASCORBIC ACID 1500 MG: 500 INJECTION, SOLUTION INTRAMUSCULAR; INTRAVENOUS; SUBCUTANEOUS at 19:55

## 2019-09-17 RX ADMIN — HYDROMORPHONE HYDROCHLORIDE 4 MG: 2 TABLET ORAL at 21:47

## 2019-09-17 RX ADMIN — PIPERACILLIN AND TAZOBACTAM 4.5 G: 4; .5 INJECTION, POWDER, FOR SOLUTION INTRAVENOUS at 21:23

## 2019-09-17 RX ADMIN — HYDROMORPHONE HYDROCHLORIDE 0.5 MG: 1 INJECTION, SOLUTION INTRAMUSCULAR; INTRAVENOUS; SUBCUTANEOUS at 13:34

## 2019-09-17 RX ADMIN — MILNACIPRAN HYDROCHLORIDE 50 MG: 50 TABLET, FILM COATED ORAL at 21:26

## 2019-09-17 RX ADMIN — CHLORHEXIDINE GLUCONATE 15 ML: 1.2 RINSE ORAL at 10:10

## 2019-09-17 RX ADMIN — SENNOSIDES AND DOCUSATE SODIUM 1 TABLET: 8.6; 5 TABLET ORAL at 21:26

## 2019-09-17 RX ADMIN — HYDROMORPHONE HYDROCHLORIDE 0.5 MG: 1 INJECTION, SOLUTION INTRAMUSCULAR; INTRAVENOUS; SUBCUTANEOUS at 01:43

## 2019-09-17 RX ADMIN — HEPARIN SODIUM 5000 UNITS: 5000 INJECTION, SOLUTION INTRAVENOUS; SUBCUTANEOUS at 11:39

## 2019-09-17 RX ADMIN — PROPOFOL 50 MCG/KG/MIN: 10 INJECTION, EMULSION INTRAVENOUS at 02:25

## 2019-09-17 RX ADMIN — VANCOMYCIN HYDROCHLORIDE 2000 MG: 10 INJECTION, POWDER, LYOPHILIZED, FOR SOLUTION INTRAVENOUS at 19:55

## 2019-09-17 RX ADMIN — PIPERACILLIN AND TAZOBACTAM 4.5 G: 4; .5 INJECTION, POWDER, FOR SOLUTION INTRAVENOUS at 08:57

## 2019-09-17 RX ADMIN — ASCORBIC ACID 1500 MG: 500 INJECTION, SOLUTION INTRAMUSCULAR; INTRAVENOUS; SUBCUTANEOUS at 01:29

## 2019-09-17 RX ADMIN — ACETAMINOPHEN 650 MG: 325 TABLET, FILM COATED ORAL at 11:34

## 2019-09-17 RX ADMIN — HYDROMORPHONE HYDROCHLORIDE 0.5 MG: 1 INJECTION, SOLUTION INTRAMUSCULAR; INTRAVENOUS; SUBCUTANEOUS at 17:36

## 2019-09-17 RX ADMIN — HEPARIN SODIUM 5000 UNITS: 5000 INJECTION, SOLUTION INTRAVENOUS; SUBCUTANEOUS at 21:26

## 2019-09-17 RX ADMIN — ASCORBIC ACID 1500 MG: 500 INJECTION, SOLUTION INTRAMUSCULAR; INTRAVENOUS; SUBCUTANEOUS at 13:29

## 2019-09-17 RX ADMIN — VANCOMYCIN HYDROCHLORIDE 1500 MG: 5 INJECTION, POWDER, LYOPHILIZED, FOR SOLUTION INTRAVENOUS at 07:16

## 2019-09-17 ASSESSMENT — ACTIVITIES OF DAILY LIVING (ADL)
ADLS_ACUITY_SCORE: 15
ADLS_ACUITY_SCORE: 14
ADLS_ACUITY_SCORE: 15
ADLS_ACUITY_SCORE: 15
ADLS_ACUITY_SCORE: 14
ADLS_ACUITY_SCORE: 15

## 2019-09-17 ASSESSMENT — MIFFLIN-ST. JEOR: SCORE: 2215

## 2019-09-17 NOTE — H&P
[facility]    Hospitalist Consultation    Date of Admission:  9/12/2019    Assessment & Plan   Juancho Mohan is a 48 year old male who was admitted on 9/12/2019. I was asked to see the patient for transfer of care from ICU, orthopedic surgery remains primary.    S/p Elective left below knee amputation for chronic pain on 9/12/19  Septic shock- resolved  Metabolic encephalopathy secondary to sepsis with gram positive bacteremia- improved  Strep bacteremia (gram +), preliminary  Initially did well after OR, then had RRT 9/16 requiring initiation of sepsis protocol for likely infection. CT chest showed bilateral lower lobe opacities, blood cultures with gram + cocci in both bottles. Possible cellulitis developing along left lower extremity. He was encephalopathic initially, very unresponsive even after IVF, but has much improved today-does not remember most events of yesterday however. He is off all pressors and lactate normalized today. WBC yesterday was 6.9, today is 9.6. Lactate max was 3.6, now 1.7. Procalcitonin was 93.56. Tmax 102  - IMC status  - Post op wound care and pain management per ortho  - Repeat CT scan of left lower extremity pending for evaluation of knee effusion or abscess as his pain in uncontrolled  - Recheck blood cultures today  - Remove bernard in AM for good void trial  - Continue vitamin C and thiamine per protocols initiated in ICU, continue stress dose hydrocortisone (has hx steroid use)  - Continue vanc/zosyn, await sensitivities of blood cultures.  - Advance diet as tolerated  - PICC line placed on 9/16 while with gram + cocci in blood  - Trend fevers  - Recheck lactate 9/18    Acute hypoxic respiratory failure requiring intubation  Bilateral lower lobe opacities, atelectasis vs pneumonia  RRT called 9/16 for o2 sat 82-83% on 6L o2, was placed on bipap, but became unresponsive and required intubation. Transferred to ICU for ongoing cares. Extubated 9/17 AM  successfully to room air.  - Continue close monitoring on IMC status overnight with continuous pulse ox  - IV abx as above for possible pneumonia  - Encourage incentive spirometry    GERALD  Baseline creatinine near 0.7-0.9. Worsened to 1.77 on day of RRT due to shock. Had poor urine output, now improved after IVF resuscitation  - Continue IVF overnight, Cr improving to 1.32  - Repeat Cr in AM, hold nephrotoxic medications including combination lisinopril-hydrochlorothiazide.    Mild Anemia  Hgb 9.2 during RRT, improved to 12 this AM after fluid resuscitation. Did have 25ml EBL in OR   - Repeat CBC in AM    Hx Sarcoidosis  Possible Asthma  Continue PTA breo, prn albuterol inhaler    Hypertension  PTA lisinopril/hctz 20-12.5 BID, hold while with GERALD  - PRN hydralazine IV for SBP>160    Hyperlipidemia  PTA pravastatin 10mg, continue    Adjustment disorder with anxiety and depression  PTA wellbutrin, lamictal and lexapro, both continued while inpatient    Malignant melanoma of right thigh  Maintained on keytruda as outpatient    Hx migraines   Not currently with migraine, uses PRN imitrex at home.    Constipation  Has not had a BM all admission. Has senna-docusate scheduled, added PRN miralax BID    DVT Prophylaxis: Heparin SQ  Code Status: Full Code    Disposition: Expected discharge in per orthopedic surgery service    Karen Perez DO    Reason for Consult   Reason for consult: No longer requiring ICU care    Primary Care Physician   [facility]    Chief Complaint   Admitted    History is obtained from the patient and medical records    History of Present Illness   Juancho Mohan is a 48 year old male who presented on 9/12 for elective left below knee amputation with orthopedic surgery for chronic pain after workers comp injury many years ago. Initially was progressing well after surgery, had been transitioned to PO pain regimen and there were plans to set up home care. On 9/16 RRT was called and he  was found to be mottled, cool extremities, lethargic with fever, hypotension, tachycardia, and hypoxia on supplemental O2. He denied any complaints at that time other than some mild dizziness. He also had minimal urine output noted on 9/16. He was placed on bipap, transferred to ICU for monitoring and started on sepsis protocol with vanc/zosyn, IVF resuscitation. He was taken for CT head and CT PE chest to rule out obstructive shock with PE (no findings found). He was extubated this AM, weaned off levophed. He is now hypertensive and not requiring any supplemental O2. Blood cx found that he had gram + cocci in pairs and chains in both bottles, strep preliminarily.  Today he has no complaints other than pain in the left knee that is worse than it was the days prior. No chest pain, shortness of breath, nausea, vomiting, fevers, chills. Has not had a BM so far this admission.    Past Medical History    I have reviewed this patient's medical history and updated it with pertinent information if needed.   Past Medical History:   Diagnosis Date     Alcohol abuse, episodic      Attention deficit disorder with hyperactivity(314.01)      Congenital spondylolisthesis     L5-S1     Depressive disorder, not elsewhere classified      Dyspnea on exertion      Essential hypertension, benign      Gastro-oesophageal reflux disease      Generalized anxiety disorder      Migraine, unspecified, with intractable migraine, so stated, without mention of status migrainosus 8/01/05    acts like a stroke, hospitalized, saw neurology     Pneumonia, organism unspecified(486) 09/21/08    Admit. Discharged 09/22/08-[facility]     Reflux esophagitis      Renal disease     kidney stone     RSD (reflex sympathetic dystrophy)      Sarcoidoses 6/27/12    EBUS- FNA. Unable to do special stains     Sleep apnea 6/11/2009    no c pap usage       Past Surgical History   I have reviewed this patient's surgical history and updated it with pertinent  information if needed.  Past Surgical History:   Procedure Laterality Date     AMPUTATE LEG BELOW KNEE Left 9/12/2019    Procedure: LEFT BELOW KNEE AMPUTATION;  Surgeon: Amauri Mccoy MD;  Location:  OR     ARTHRODESIS ANKLE  12/31/2013    Procedure: ARTHRODESIS ANKLE;  LEFT SAPHENOUS NERVE BLOCK, LEFT REVISION SUBTALAR FUSION, POSTERIOR BONE BLOCK, TENDOACHILLES LENGTHENING, PLANTAR CALCANEAL EXCOSTECTOMY, LEFT SURAL NEURECTOMY  (FEMORAL HEAD ALLOGRAFT, ALLOSTEM STRIPS, SYNTHES 6.5 MM HEADLESS COMPRESSION SCREWS) ;  Surgeon: Amauri Mccoy MD;  Location:  OR     ARTHROSCOPY ANKLE Left 7/15/2015    Procedure: ARTHROSCOPY ANKLE;  Surgeon: Amauri Mccoy MD;  Location:  OR     BRONCHOSCOPY FLEXIBLE  6/27/2012    Procedure: BRONCHOSCOPY FLEXIBLE;  Flexible Bronchoscopy, Endobronchial Ultrasound with Biopsies;  Surgeon: Justice Dos Santos MD;  Location: [facility] OR     C LUMBAR SPINE FUSION,ANTER APPRCH  12/00     C NONSPECIFIC PROCEDURE  2/03    Multiple ortho injuries after 38 foot fall - Fx x 3 right foot, Tibial plateau fx - with ORIF; Left heel fx - plate/screws.     EXCISE EXOSTOSIS FOOT  12/31/2013    Procedure: EXCISE EXOSTOSIS FOOT;  PLANTAR CALCANEOUS EXOSTECOMY;  Surgeon: Amauri Mccoy MD;  Location:  OR     EXERCISE STRESS TEST NL  Jan 2010    normal     GRAFT BONE FROM ILIAC CREST  12/31/2013    Procedure: GRAFT BONE FROM ILIAC CREST;  BONE MARROW  ASPIRATION FROM RIGHTILIAC CREST;  Surgeon: Amauri Mccoy MD;  Location:  OR     HC ARTHROTOMY/EXPLORE/TREAT KNEE JOINT      x3 left; x1 right     HC REMOVAL DEEP IMPLANT  6/16/2009    Left foot. Open wedge osteotomy through the calcaneus. Fusion of left calcaneocuboid joint.     HC REPAIR OF NASAL SEPTUM  12/02/08    [facility]     IRRIGATION AND DEBRIDEMENT FOOT, COMBINED Left 7/15/2015    Procedure: COMBINED IRRIGATION AND DEBRIDEMENT FOOT;  Surgeon: Amauri Mccoy MD;  Location:   OR     IRRIGATION AND DEBRIDEMENT LOWER EXTREMITY, COMBINED Left 7/10/2015    Procedure: COMBINED IRRIGATION AND DEBRIDEMENT LOWER EXTREMITY;  Surgeon: Kirt Godwin MD;  Location: [facility] OR     LENGTHEN TENDON ACHILLES  12/31/2013    Procedure: LENGTHEN TENDON ACHILLES;;  Surgeon: Amauri Mccoy MD;  Location: [facility] OR       Prior to Admission Medications   Prior to Admission Medications   Prescriptions Last Dose Informant Patient Reported? Taking?   IMITREX 20 MG/ACT nasal spray 8/29/2019 at prn Self No Yes   Sig: SPRAY 1 SPRAY IN NOSTRIL AS NEEDED FOR MIGRAINE (MAY REPEAT AFTER 2 HRS IF NEEDED, MAX 2 SPRAYS IN 24 HRS.).   ORDER FOR DME  Self No No   Sig: Equipment being ordered: Walker Wheels (), Walker () and Crutches ()  Treatment Diagnosis: decreased functional mobility   ORDER FOR DME  Self No No   Sig: Equipment being ordered: Walker Wheels (), Walker () and Crutches ()  Treatment Diagnosis: Decreased functional mobility   acetaminophen (TYLENOL) 325 MG tablet 8/22/2019 at prn Self No Yes   Sig: Take 2 tablets (650 mg) by mouth every 4 hours as needed   albuterol (PROAIR HFA/PROVENTIL HFA/VENTOLIN HFA) 108 (90 Base) MCG/ACT inhaler 9/10/2019 at prn Self Yes Yes   Sig: Inhale 2 puffs into the lungs 4 times daily as needed for shortness of breath / dyspnea or wheezing   buPROPion (WELLBUTRIN) 75 MG tablet 9/11/2019 at 2130 Self Yes Yes   Sig: Take 75 mg by mouth 2 times daily   escitalopram (LEXAPRO) 10 MG tablet 9/11/2019 at 2130 Self Yes Yes   Sig: Take 10 mg by mouth every morning   fluticasone-vilanterol (BREO ELLIPTA) 100-25 MCG/INH inhaler 9/12/2019 at 0700 Self Yes Yes   Sig: Inhale 1 puff into the lungs daily   lamoTRIgine (LAMICTAL) 200 MG tablet 9/11/2019 at 2130 Self Yes Yes   Sig: Take 100 mg by mouth 2 times daily    lisinopril-hydrochlorothiazide (PRINZIDE/ZESTORETIC) 20-12.5 MG tablet 9/11/2019 at 2130 Self Yes Yes   Sig: Take 1 tablet by mouth 2 times daily    milnacipran (SAVELLA) 50 MG TABS 9/11/2019 at 2130 Self Yes Yes   Sig: Take 50 mg by mouth 2 times daily.   ondansetron (ZOFRAN-ODT) 8 MG ODT tab 9/5/2019 at prn Self Yes Yes   Sig: Take 8 mg by mouth every 8 hours as needed for nausea   pravastatin (PRAVACHOL) 10 MG tablet 9/11/2019 at 2130 Self Yes Yes   Sig: Take 10 mg by mouth every evening    vitamin C (ASCORBIC ACID) 1000 MG TABS 9/11/2019 at 2130 Self Yes Yes   Sig: Take 1,000 mg by mouth daily      Facility-Administered Medications: None     Allergies   Allergies   Allergen Reactions     Lansoprazole      prevacid-arm rash     Steri Strips      blisters       Social History   I have reviewed this patient's social history and updated it with pertinent information if needed. Juancho Mohan  reports that he has never smoked. His smokeless tobacco use includes chew. He reports that he has current or past drug history. Drugs: Methamphetamines and Cocaine. Frequency: 2.00 times per week. He reports that he does not drink alcohol.    Family History   I have reviewed this patient's family history and updated it with pertinent information if needed.   Family History   Problem Relation Age of Onset     Diabetes Mother         age 50s     Hypertension Mother      C.A.D. Mother         age 50s     Cerebrovascular Disease Mother      Heart Disease Mother      Obesity Mother      Diabetes Father      Obesity Sister        Review of Systems   The 10 point Review of Systems is negative other than noted in the HPI    Physical Exam   Temp: 98.42  F (36.9  C) Temp src: Bladder BP: (!) 157/95 Pulse: 100 Heart Rate: 104 Resp: 18 SpO2: 94 % O2 Device: None (Room air)    Vital Signs with Ranges  Temp:  [98.42  F (36.9  C)-102  F (38.9  C)] 98.42  F (36.9  C)  Pulse:  [] 100  Heart Rate:  [] 104  Resp:  [7-41] 18  BP: ()/(48-97) 157/95  FiO2 (%):  [40 %-70 %] 40 %  SpO2:  [89 %-100 %] 94 %  285 lbs 14.99 oz    Constitutional: Awake, alert, cooperative, no  apparent distress.  Eyes: Conjunctiva and pupils examined and normal.  HEENT: Moist mucous membranes, normal dentition.  Respiratory: Clear to auscultation bilaterally, no crackles or wheezing with exception of decreased breath sounds at bases. Deep breaths limited by pleuritic pain.  Cardiovascular: Tachycardic (near 100) regular rhythm, normal S1 and S2, and no murmur noted.  GI: Soft, non-distended, non-tender, normal bowel sounds.  Lymph/Hematologic: No anterior cervical or supraclavicular adenopathy.  Skin: No rashes, no cyanosis, no edema.  Musculoskeletal: Left BKA stump with incision intact, mild erythema surrounding with some erythema posteriorly extending up to thigh. Tenderness with any joint movement, pain with gripping posterior thigh to raise leg up.  Neurologic: Cranial nerves 2-12 intact, normal strength and sensation.  Psychiatric: Alert, oriented to person, place and time, no obvious anxiety or depression. Has no memory of events yesterday other than remembering a lot of people were around him.    Data   -Data reviewed today: All pertinent laboratory and imaging results from this encounter were reviewed. I personally reviewed CT head shows no hemorrhage or recent infarct. CT Chest PE study shows no PE, does show large bilateral lower lobe consolidations atelectasis vs pneumonia  Recent Labs   Lab 09/17/19  0605 09/16/19  1521 09/16/19  1345  09/12/19  1013   WBC 9.6  --  6.9  --   --    HGB 12.0*  --  9.2*  --  15.0   MCV 87  --  88  --   --      --  195  --   --     140 140  --   --    POTASSIUM 4.6 4.4 4.1  --  3.9   CHLORIDE 102 104 107  --   --    CO2 30 31 17*  --   --    BUN 18 16 8  --   --    CR 1.32* 1.77* 0.73  --  0.96   ANIONGAP 6 5 16*  --   --    QUE 7.6* 8.5 7.5*  --   --    * 108* 67*   < >  --    ALBUMIN 2.4* 2.8* 1.4*   < >  --    PROTTOTAL 5.9* 6.3* 3.0*   < >  --    BILITOTAL 1.0 1.0 0.5   < >  --    ALKPHOS 73 100 50   < >  --    ALT 33 30 14   < >  --     AST 46* 46* 20   < >  --    TROPI  --   --  0.022  --   --     < > = values in this interval not displayed.       Recent Results (from the past 24 hour(s))   XR Chest Port 1 View    Narrative    CHEST ONE VIEW PORTABLE   9/16/2019 5:00 PM     HISTORY: New intubation for increased lethargy    COMPARISON: 6/6/2012      Impression    IMPRESSION: Tip of the ET tube is just in the proximal right mainstem  bronchus and ET tube needs to be pulled back. There is left lower lung  opacity which could be atelectasis or infiltrate. Mild patchy opacity  right upper and lower lung. There is a coiled tube projecting over the  right side of the neck which is indeterminate. Enlarged cardiac  silhouette. Position of the ET tube and need to pull back the ET tube  was discussed by myself with patient's ICU nurse Gayle at 6:01 PM on  9/16/2019.    DENILSON NICHOLSON MD   XR Abdomen Port 1 View    Narrative    XR ABDOMEN PORT 1 VW   9/17/2019 12:25 AM     HISTORY: NG tube placement.    COMPARISON: None.       Impression    IMPRESSION: Nasoenteric feeding tube appears to be looped in the  distal esophagus and the tip extends into the right lung base. This  has since been removed on a follow-up chest x-ray.    ROSALINE DARLING MD   XR Chest Port 1 View    Narrative    XR CHEST PORT 1 VW   9/17/2019 12:35 AM     HISTORY: Inadvertent placement of NG tube.    COMPARISON: 9/16/2019.    FINDINGS: Upright portable chest. ET tube approximately 4 cm above the  barbara. Right PICC is in place with tip at the junction of right  atrium and SVC. No NG tube is seen. No pneumothorax. The heart size is  normal. There are bibasilar infiltrates similar to the previous exam.  Probable left pleural effusion.      Impression    IMPRESSION: Bibasilar infiltrates.    ROSALINE DARLING MD   CT Head w/o Contrast    Narrative    EXAM: CT HEAD W/O CONTRAST  LOCATION: [facility]  DATE/TIME: 9/17/2019 4:51 AM    INDICATION: Altered level of consciousness  (LOC), unexplained, altered mental status.  COMPARISON: None.  TECHNIQUE: Routine without IV contrast. Multiplanar reformats. Dose reduction techniques were used.    FINDINGS: Technically suboptimal secondary to streak artifact generated by external materials.  INTRACRANIAL CONTENTS: No intracranial hemorrhage, extraaxial collection, or mass effect.  No CT evidence of acute infarct. Normal parenchymal attenuation. Normal ventricles and sulci.     VISUALIZED ORBITS/SINUSES/MASTOIDS: No intraorbital abnormality. Pneumatized fluid in the right sphenoid locule. No middle ear or mastoid effusion.    BONES/SOFT TISSUES: No acute abnormality.      Impression    IMPRESSION:  1.  No CT evidence of acute intracranial hemorrhage, mass or recent infarct.  2.  Pneumatized fluid in the right sphenoid locule. Correlate for acute sinusitis.           CT Chest (PE) Abdomen Pelvis w Contrast    Narrative    CT CHEST PE ABDOMEN PELVIS W CONTRAST   9/17/2019 5:14 AM      HISTORY:  Shortness of breath; sepsis.    TECHNIQUE:  CT chest, abdomen and pelvis with intravenous contrast.  Radiation dose for this scan was reduced using automated exposure  control, adjustment of the mA and/or kV according to patient size, or  iterative reconstruction technique.  83mL Isouve-370      COMPARISON:  8/10/2012.    FINDINGS:    Chest:  Evaluation of the pulmonary arterial system is somewhat  limited by artifact of the patient's arms being at his sides. No  convincing pulmonary emboli. No aortic aneurysm or dissection. The  heart is at the upper limits of normal in size. No mediastinal, hilar  or axillary lymph node enlargement. ET tube in the mid trachea. Right  PICC in place. There are bilateral lower lobe consolidations,  atelectasis versus pneumonia. There are also consolidations  dependently within the lingula and right middle lobe. No pneumothorax  or pleural effusion.    Abdomen: The liver, spleen, gallbladder, pancreas, adrenal glands  and  kidneys are normal in appearance. There is no abdominal or pelvic  lymph node enlargement.    Pelvis: There is a catheter in the urinary bladder. No bowel  obstruction. Moderate amount of stool in the colon. No free  intraperitoneal gas or fluid. Postoperative changes in the lumbosacral  spine.      Impression    IMPRESSION:  1. There is no evidence of pulmonary embolus on this somewhat limited  study. No aortic aneurysm or dissection.  2. Large bilateral lower lobe consolidations, atelectasis versus  pneumonia.  3. No acute abdominal or pelvic abnormality.    ROSALINE DARLING MD

## 2019-09-17 NOTE — PROGRESS NOTES
ICU Note    Difficult time placing NG - inadvertent place in lung, subsequently removed. Still pressors dependent, will add vitamin C - 1.5 g q 6, discussed with family.       Jean Curiel MD

## 2019-09-17 NOTE — PROVIDER NOTIFICATION
Pt called RN to room reporting significantly worse and different pain with LE weakness . Right knee appears swollen red and warm to touch. LLE dressing taken down, LLE erythematous, hot to touch and painful. Kurtis Garza and [facility] ICU updated awaiting call back from surgeon.

## 2019-09-17 NOTE — PROGRESS NOTES
Medical ICU Cross Cover:    Notified that 1 of 2 blood Cx positive for Gm + cocci in pairs and chains, likely Streptococcus, contaminant vs bacteremia s/p BKA.  He presented with signs of septic shock with diaphoresis, tachycardia, fever to 102.4, hypotension, and procalcitonin of > 90 so bacteremia is possible.  Patient is currently on vancomycin as part of antibiotic coverage which should cover Streptococcus bacteremia and is clinically improving with decreased pressor requirements and decreased fever, so no changes to antibiotics were made.  Will continue to follow cultures.    Dominguez Barboza, MS4  The patient was staffed with Dr. Curiel.    Agree with above    Jean Curiel MD

## 2019-09-17 NOTE — PROGRESS NOTES
Spoke to care coordinator for Dr. Mccoy named Mona about status of patient.  Concerns about stump relayed to team.

## 2019-09-17 NOTE — PROGRESS NOTES
Patient evaluated in ICU.  No concern for infected stump or wound infection at this time.  Will order CT of the L leg to evaluate for abscess or knee effusion, but low concern.  No clear source of infection at this time.  Will continue to follow.  Formal post-op note to follow.

## 2019-09-17 NOTE — PHARMACY-VANCOMYCIN DOSING SERVICE
Pharmacy Vancomycin Note  Date of Service 2019  Patient's  1970   48 year old, male    Indication: Sepsis and Skin and Soft Tissue Infection  Goal Trough Level: 15-20 mg/L  Day of Therapy: 2  Current Vancomycin regimen:  1500 mg IV q8h    Current estimated CrCl = Estimated Creatinine Clearance: 97.3 mL/min (A) (based on SCr of 1.32 mg/dL (H)).    Creatinine for last 3 days  2019:  1:45 PM Creatinine 0.73 mg/dL;  3:21 PM Creatinine 1.77 mg/dL  2019:  6:05 AM Creatinine 1.32 mg/dL    Recent Vancomycin Levels (past 3 days)  2019:  3:40 PM Vancomycin Level 16.0 mg/L    Vancomycin IV Administrations (past 72 hours)                   vancomycin 1500 mg in 0.9% NaCl 250 ml intermittent infusion 1,500 mg (mg) 1,500 mg Given 19 0716     1,500 mg Given 19 2255     1,500 mg Given  1605                Nephrotoxins and other renal medications (From now, onward)    Start     Dose/Rate Route Frequency Ordered Stop    19 1700  vancomycin (VANCOCIN) 2,000 mg in sodium chloride 0.9 % 500 mL intermittent infusion      2,000 mg  over 2 Hours Intravenous EVERY 12 HOURS 19 1636      19 1430  piperacillin-tazobactam (ZOSYN) 4.5 g vial to attach to  mL bag      4.5 g  over 30 Minutes Intravenous EVERY 6 HOURS 19 1427               Contrast Orders - past 72 hours (72h ago, onward)    Start     Dose/Rate Route Frequency Ordered Stop    19 0445  iopamidol (ISOVUE-370) solution 83 mL      83 mL Intravenous ONCE 19 0441 19 0506          Interpretation of levels and current regimen:  Trough level is  Therapeutic, but was drawn 8.5 hours after dose  Has serum creatinine changed > 50% in last 72 hours: No, but did rise   Urine output:  diminished urine output  Renal Function: GERALD, improving    Plan:  1.  Decrease Dose to 2000mg IV q12hrs  2.  Pharmacy will check trough levels as appropriate in 3-4 doses.    3. Serum creatinine levels will be ordered  daily for the first week of therapy and at least twice weekly for subsequent weeks.      Ling Hernandez RPH        .

## 2019-09-17 NOTE — PROGRESS NOTES
Pt extubated per MD order after passing 30mins pressure support trial. Extubated at 1012 to room air spo2 93%. Lung sound clear no stridor noted.      Allen Connell, RT on 9/17/2019 at 10:28 AM

## 2019-09-17 NOTE — PROGRESS NOTES
[facility] Intensive Care Progress Note    Date of Service (when I saw the patient): 09/17/2019     Assessment & Plan   Juancho Mohan is a 48 year old male POD 5 s/p left BKA who was admitted on 9/12/2019 for septic shock secondary to GPCs, unclear source at present.    Neuro/Pain/Sedation:  #Acute pain post-operative  #Encephalopathy 2/2 sepsis (improved)  Plan:  Will defer to Orthopedics for post-op pain control  Dilaudid IVP for now  Restart gabapentin   Neurochecks q2h    Cardiovascular:  #Septic Shock  Off vasopressors  Lactate normalized, UO > 100cc/h, HR improved,   Not candidate for Vit C study but got Vitamin C & thiamine  Will d/w orthopedics about evaluating stump as source of sepsis  Plan:  Continue current management  Cardiac monitoring  Goal MAP > 65    Pulmonary:        #Acute Respiratory failure  #Loss of protective airway reflexes  Intubated overnight because became unresponsive on BIPAP  CT overnight negative for PE; showed bilateral infiltrates   Extubated this AM to room air O2 sats > 90%  Plan:  Monitor respiratory status    Supplemental O2 as needed    Renal:  #GERALD  Cr improving today  UO adequate  Electrolytes within normal limits  Plan:  Monitor UO  Daily electrolytes  Fitzgerald for accurate I/Os; remove later today once UO remains adequate    ID:       #Septic Shock 2/2 GPC - unclear source  Plan:  Continue Broad spectrum   Await final sensitivities; if MSSA will involve ID      GI/Nutrition:  #NPO status  #Stress gastritis PPx  Not candidate and no hx of GERD  Plan:  Intubated for < 24 hours - will advance diet as tolerated    Endocrine:  #hx of steroid use (PO post chemo & nebs)  Monitor FS (goal 140-180)  Plan:  Continue Hydrocortisone 50mg q6h stress dose    Heme/Onc:  #Mild post-op anemia   Normal plts  Plan:  Monitor H/H  Continue Heparin for DVT PPx    MSK/Other:  Rehab   Ortho follow up     DVT Prophylaxis: Heparin SQ  GI Prophylaxis: Not  indicated    Restraints: Restraints for medical healing needed: NO    Family update by me today: Yes     Giovanni Borrero    Main Plans for Today   Monitor respiratory status post-extuabtion  F/up cultures/sensitivities     Interval History   9/16/19: presented in septic shock, protocol initiated, intubated, cultures + for GPCs, on broad spectrum IV Abx, PICC line placed, lactate normalized, ECHO with normal LV/RV function, CT showing no PE   9/17/19: extubated around 9 AM, off vasopressors, oriented x 3, stable respiratory status, labs & UO improved    Physical Exam   Temp: 98.96  F (37.2  C) Temp src: Bladder Temp  Min: 95.9  F (35.5  C)  Max: 102  F (38.9  C) BP: (!) 140/78 Pulse: 81 Heart Rate: 106 Resp: 12 SpO2: 93 % O2 Device: None (Room air) Oxygen Delivery: 15 LPM  Vitals:    09/12/19 1013 09/16/19 1800 09/17/19 0515   Weight: 122 kg (269 lb) 130 kg (286 lb 9.6 oz) 129.7 kg (285 lb 15 oz)     I/O last 3 completed shifts:  In: 4989.51 [I.V.:989.51; IV Piggyback:4000]  Out: 2200 [Urine:2200]    Neurologic: GCS 15, oriented x3, amnesia to what happened following bernard insertion yesterday; moves all fours, no focal deficits  Cardiovascular: RRR, no murmurs  Respiratory: good air entry b/l, slightly decreased at bases  GI: abd soft, non-tender, non-distended, normoactive bowel sounds  Skin/Extremities: mottled appearance still present but improved; warm extremities in all fours; left BKA stump with persistent erythema, moderate tenderness, no fluid collections noted  Lines: No erythema or discharge at entry site for PICC Line  Current lines are required for patient management    Medications     norepinephrine Stopped (09/17/19 1013)       ascorbic acid intermittent infusion  1,500 mg Intravenous Q6H     chlorhexidine  15 mL Mouth/Throat Q12H     heparin  5,000 Units Subcutaneous Q8H     hydrocortisone sodium succinate PF  50 mg Intravenous Q6H     piperacillin-tazobactam  4.5 g Intravenous Q6H      senna-docusate  1 tablet Per NG tube BID    Or     senna-docusate  2 tablet Oral BID     sodium chloride (PF)  10 mL Intracatheter Q8H     sodium chloride (PF)  3 mL Intracatheter Q8H     thiamine  200 mg Intravenous BID     vancomycin (VANCOCIN) IV  1,500 mg Intravenous Q8H       Data   Recent Labs   Lab 09/17/19  0605 09/16/19  1521 09/16/19  1345  09/12/19  1013   WBC 9.6  --  6.9  --   --    HGB 12.0*  --  9.2*  --  15.0   MCV 87  --  88  --   --      --  195  --   --     140 140  --   --    POTASSIUM 4.6 4.4 4.1  --  3.9   CHLORIDE 102 104 107  --   --    CO2 30 31 17*  --   --    BUN 18 16 8  --   --    CR 1.32* 1.77* 0.73  --  0.96   ANIONGAP 6 5 16*  --   --    QUE 7.6* 8.5 7.5*  --   --    * 108* 67*   < >  --    ALBUMIN 2.4* 2.8* 1.4*   < >  --    PROTTOTAL 5.9* 6.3* 3.0*   < >  --    BILITOTAL 1.0 1.0 0.5   < >  --    ALKPHOS 73 100 50   < >  --    ALT 33 30 14   < >  --    AST 46* 46* 20   < >  --    TROPI  --   --  0.022  --   --     < > = values in this interval not displayed.     Recent Results (from the past 24 hour(s))   US Lower Extremity Venous Duplex Bilateral    Narrative    BILATERAL LEG VENOUS ULTRASOUND 9/16/2019 4:20 PM    HISTORY: Recent left below the knee amputation. Respiratory distress.     TECHNIQUE; Venous Doppler ultrasound with color flow and spectral  Doppler with waveform analysis performed. Compression and augmentation  performed.    COMPARISON none       Impression    IMPRESSION : No evidence for deep vein thrombus in right or left lower  extremity. Below knee amputation of the left leg. No evidence for  greater saphenous vein thrombus.    DENILSON NICHOLSON MD   XR Chest Port 1 View    Narrative    CHEST ONE VIEW PORTABLE   9/16/2019 5:00 PM     HISTORY: New intubation for increased lethargy    COMPARISON: 6/6/2012      Impression    IMPRESSION: Tip of the ET tube is just in the proximal right mainstem  bronchus and ET tube needs to be pulled back. There is  left lower lung  opacity which could be atelectasis or infiltrate. Mild patchy opacity  right upper and lower lung. There is a coiled tube projecting over the  right side of the neck which is indeterminate. Enlarged cardiac  silhouette. Position of the ET tube and need to pull back the ET tube  was discussed by myself with patient's ICU nurse Gayle at 6:01 PM on  9/16/2019.    DENILSON NICHOLSON MD   XR Abdomen Port 1 View    Narrative    XR ABDOMEN PORT 1 VW   9/17/2019 12:25 AM     HISTORY: NG tube placement.    COMPARISON: None.       Impression    IMPRESSION: Nasoenteric feeding tube appears to be looped in the  distal esophagus and the tip extends into the right lung base. This  has since been removed on a follow-up chest x-ray.    ROSALINE DARLING MD   XR Chest Port 1 View    Narrative    XR CHEST PORT 1 VW   9/17/2019 12:35 AM     HISTORY: Inadvertent placement of NG tube.    COMPARISON: 9/16/2019.    FINDINGS: Upright portable chest. ET tube approximately 4 cm above the  barbara. Right PICC is in place with tip at the junction of right  atrium and SVC. No NG tube is seen. No pneumothorax. The heart size is  normal. There are bibasilar infiltrates similar to the previous exam.  Probable left pleural effusion.      Impression    IMPRESSION: Bibasilar infiltrates.    ROSALINE DARLING MD   CT Head w/o Contrast    Narrative    EXAM: CT HEAD W/O CONTRAST  LOCATION: [facility]  DATE/TIME: 9/17/2019 4:51 AM    INDICATION: Altered level of consciousness (LOC), unexplained, altered mental status.  COMPARISON: None.  TECHNIQUE: Routine without IV contrast. Multiplanar reformats. Dose reduction techniques were used.    FINDINGS: Technically suboptimal secondary to streak artifact generated by external materials.  INTRACRANIAL CONTENTS: No intracranial hemorrhage, extraaxial collection, or mass effect.  No CT evidence of acute infarct. Normal parenchymal attenuation. Normal ventricles and sulci.     VISUALIZED  ORBITS/SINUSES/MASTOIDS: No intraorbital abnormality. Pneumatized fluid in the right sphenoid locule. No middle ear or mastoid effusion.    BONES/SOFT TISSUES: No acute abnormality.      Impression    IMPRESSION:  1.  No CT evidence of acute intracranial hemorrhage, mass or recent infarct.  2.  Pneumatized fluid in the right sphenoid locule. Correlate for acute sinusitis.           CT Chest (PE) Abdomen Pelvis w Contrast    Narrative    CT CHEST PE ABDOMEN PELVIS W CONTRAST   9/17/2019 5:14 AM      HISTORY:  Shortness of breath; sepsis.    TECHNIQUE:  CT chest, abdomen and pelvis with intravenous contrast.  Radiation dose for this scan was reduced using automated exposure  control, adjustment of the mA and/or kV according to patient size, or  iterative reconstruction technique.  83mL Isouve-370      COMPARISON:  8/10/2012.    FINDINGS:    Chest:  Evaluation of the pulmonary arterial system is somewhat  limited by artifact of the patient's arms being at his sides. No  convincing pulmonary emboli. No aortic aneurysm or dissection. The  heart is at the upper limits of normal in size. No mediastinal, hilar  or axillary lymph node enlargement. ET tube in the mid trachea. Right  PICC in place. There are bilateral lower lobe consolidations,  atelectasis versus pneumonia. There are also consolidations  dependently within the lingula and right middle lobe. No pneumothorax  or pleural effusion.    Abdomen: The liver, spleen, gallbladder, pancreas, adrenal glands and  kidneys are normal in appearance. There is no abdominal or pelvic  lymph node enlargement.    Pelvis: There is a catheter in the urinary bladder. No bowel  obstruction. Moderate amount of stool in the colon. No free  intraperitoneal gas or fluid. Postoperative changes in the lumbosacral  spine.      Impression    IMPRESSION:  1. There is no evidence of pulmonary embolus on this somewhat limited  study. No aortic aneurysm or dissection.  2. Large bilateral lower  lobe consolidations, atelectasis versus  pneumonia.  3. No acute abdominal or pelvic abnormality.    ROSALINE DARLING MD

## 2019-09-17 NOTE — PLAN OF CARE
The patient remains sedated on propofol (see MAR). Intubated. CMV RR 16, tidal volume 500ml, 50%FiO2, PEEP 10.   The patient wakes to touch/voice and follows commands. He seems to nod appropriately. Strength is good in all limbs.   The patient has been on levophed overnight. Titrating down on the dose (see MAR).   The patient's fever has decreased.  Fitzgerald catheter remains in place. Urine output increased throughout the shift. Urine was full of sediment at shift start, but has cleared. Fitzgerald cares done with bath.   The patient remains on antibiotics.   NG tube was attempted, but unsuccessful.   CT scans done overnight as patient was more stable.   Bert Reddy RN 8:26 AM 09/17/19

## 2019-09-17 NOTE — PROGRESS NOTES
[facility] ICU RESPIRATORY NOTE        Date of Admission: 9/12/2019    Date of Intubation (most recent): 9/16/19    Reason for Mechanical Ventilation: Respiratory Failure    Number of Days on Mechanical Ventilation: 2    Met Criteria for Pressure Support Trial: No    Reason for No Pressure Support Trial: per MD    Significant Events Today: None    ABG Results:   Recent Labs   Lab 09/16/19  1520 09/16/19  1335   PH 7.33* 7.34*   PCO2 51* 52*   PO2 107* 49*   HCO3 27 28   O2PER 100%  --        Current Vent Settings: Ventilation Mode: CMV/AC  (Continuous Mandatory Ventilation/ Assist Control)  FiO2 (%): 50 %  Rate Set (breaths/minute): 16 breaths/min  Tidal Volume Set (mL): 500 mL  PEEP (cm H2O): 10 cmH2O  Oxygen Concentration (%): 50 %  Resp: 12      Plan: Will continue full ventilatory support.  Assess for weaning criteria in AM    Noah Montano, RT

## 2019-09-18 LAB
ALBUMIN SERPL-MCNC: 2.3 G/DL (ref 3.4–5)
ALP SERPL-CCNC: 76 U/L (ref 40–150)
ALT SERPL W P-5'-P-CCNC: 29 U/L (ref 0–70)
ANION GAP SERPL CALCULATED.3IONS-SCNC: 3 MMOL/L (ref 3–14)
AST SERPL W P-5'-P-CCNC: 36 U/L (ref 0–45)
BILIRUB SERPL-MCNC: 0.6 MG/DL (ref 0.2–1.3)
BUN SERPL-MCNC: 21 MG/DL (ref 7–30)
CALCIUM SERPL-MCNC: 7.8 MG/DL (ref 8.5–10.1)
CHLORIDE SERPL-SCNC: 103 MMOL/L (ref 94–109)
CO2 SERPL-SCNC: 31 MMOL/L (ref 20–32)
CREAT SERPL-MCNC: 1.09 MG/DL (ref 0.66–1.25)
ERYTHROCYTE [DISTWIDTH] IN BLOOD BY AUTOMATED COUNT: 13.1 % (ref 10–15)
GFR SERPL CREATININE-BSD FRML MDRD: 80 ML/MIN/{1.73_M2}
GLUCOSE SERPL-MCNC: 107 MG/DL (ref 70–99)
HCT VFR BLD AUTO: 29.8 % (ref 40–53)
HGB BLD-MCNC: 9.7 G/DL (ref 13.3–17.7)
INTERPRETATION ECG - MUSE: NORMAL
MAGNESIUM SERPL-MCNC: 2.4 MG/DL (ref 1.6–2.3)
MCH RBC QN AUTO: 28.4 PG (ref 26.5–33)
MCHC RBC AUTO-ENTMCNC: 32.6 G/DL (ref 31.5–36.5)
MCV RBC AUTO: 87 FL (ref 78–100)
PHOSPHATE SERPL-MCNC: 3 MG/DL (ref 2.5–4.5)
PLATELET # BLD AUTO: 185 10E9/L (ref 150–450)
POTASSIUM SERPL-SCNC: 4.1 MMOL/L (ref 3.4–5.3)
PROCALCITONIN SERPL-MCNC: 39.49 NG/ML
PROT SERPL-MCNC: 5.4 G/DL (ref 6.8–8.8)
RBC # BLD AUTO: 3.42 10E12/L (ref 4.4–5.9)
SODIUM SERPL-SCNC: 137 MMOL/L (ref 133–144)
WBC # BLD AUTO: 7.4 10E9/L (ref 4–11)

## 2019-09-18 PROCEDURE — 25000128 H RX IP 250 OP 636: Performed by: HOSPITALIST

## 2019-09-18 PROCEDURE — 25000132 ZZH RX MED GY IP 250 OP 250 PS 637: Performed by: HOSPITALIST

## 2019-09-18 PROCEDURE — 12000000 ZZH R&B MED SURG/OB

## 2019-09-18 PROCEDURE — 85027 COMPLETE CBC AUTOMATED: CPT | Performed by: HOSPITALIST

## 2019-09-18 PROCEDURE — 25000132 ZZH RX MED GY IP 250 OP 250 PS 637: Performed by: PHYSICIAN ASSISTANT

## 2019-09-18 PROCEDURE — 25800030 ZZH RX IP 258 OP 636: Performed by: HOSPITALIST

## 2019-09-18 PROCEDURE — 25000125 ZZHC RX 250: Performed by: HOSPITALIST

## 2019-09-18 PROCEDURE — 25000132 ZZH RX MED GY IP 250 OP 250 PS 637: Performed by: INTERNAL MEDICINE

## 2019-09-18 PROCEDURE — 40000239 ZZH STATISTIC VAT ROUNDS

## 2019-09-18 PROCEDURE — 80053 COMPREHEN METABOLIC PANEL: CPT | Performed by: HOSPITALIST

## 2019-09-18 PROCEDURE — 99233 SBSQ HOSP IP/OBS HIGH 50: CPT | Performed by: INTERNAL MEDICINE

## 2019-09-18 PROCEDURE — 84100 ASSAY OF PHOSPHORUS: CPT | Performed by: HOSPITALIST

## 2019-09-18 PROCEDURE — 84145 PROCALCITONIN (PCT): CPT | Performed by: HOSPITALIST

## 2019-09-18 PROCEDURE — 83735 ASSAY OF MAGNESIUM: CPT | Performed by: HOSPITALIST

## 2019-09-18 PROCEDURE — 25000128 H RX IP 250 OP 636: Performed by: NURSE PRACTITIONER

## 2019-09-18 PROCEDURE — 87040 BLOOD CULTURE FOR BACTERIA: CPT | Performed by: INTERNAL MEDICINE

## 2019-09-18 PROCEDURE — 25800030 ZZH RX IP 258 OP 636: Performed by: NURSE PRACTITIONER

## 2019-09-18 PROCEDURE — 12000047 ZZH R&B IMCU

## 2019-09-18 RX ORDER — POLYETHYLENE GLYCOL 3350 17 G/17G
17 POWDER, FOR SOLUTION ORAL ONCE
Status: COMPLETED | OUTPATIENT
Start: 2019-09-18 | End: 2019-09-18

## 2019-09-18 RX ORDER — GABAPENTIN 300 MG/1
300 CAPSULE ORAL 3 TIMES DAILY
Status: DISCONTINUED | OUTPATIENT
Start: 2019-09-18 | End: 2019-09-19

## 2019-09-18 RX ADMIN — LAMOTRIGINE 100 MG: 100 TABLET ORAL at 20:28

## 2019-09-18 RX ADMIN — HYDROMORPHONE HYDROCHLORIDE 4 MG: 2 TABLET ORAL at 23:56

## 2019-09-18 RX ADMIN — HEPARIN SODIUM 5000 UNITS: 5000 INJECTION, SOLUTION INTRAVENOUS; SUBCUTANEOUS at 13:25

## 2019-09-18 RX ADMIN — HYDROMORPHONE HYDROCHLORIDE 4 MG: 2 TABLET ORAL at 13:31

## 2019-09-18 RX ADMIN — ACETAMINOPHEN 650 MG: 325 TABLET, FILM COATED ORAL at 13:31

## 2019-09-18 RX ADMIN — ASCORBIC ACID 1500 MG: 500 INJECTION, SOLUTION INTRAMUSCULAR; INTRAVENOUS; SUBCUTANEOUS at 18:02

## 2019-09-18 RX ADMIN — VANCOMYCIN HYDROCHLORIDE 2000 MG: 10 INJECTION, POWDER, LYOPHILIZED, FOR SOLUTION INTRAVENOUS at 20:28

## 2019-09-18 RX ADMIN — FLUTICASONE FUROATE AND VILANTEROL TRIFENATATE 1 PUFF: 100; 25 POWDER RESPIRATORY (INHALATION) at 08:35

## 2019-09-18 RX ADMIN — HYDROMORPHONE HYDROCHLORIDE 4 MG: 2 TABLET ORAL at 20:41

## 2019-09-18 RX ADMIN — ASCORBIC ACID 1500 MG: 500 INJECTION, SOLUTION INTRAMUSCULAR; INTRAVENOUS; SUBCUTANEOUS at 03:01

## 2019-09-18 RX ADMIN — PRAVASTATIN SODIUM 10 MG: 10 TABLET ORAL at 20:28

## 2019-09-18 RX ADMIN — CYCLOBENZAPRINE HYDROCHLORIDE 10 MG: 10 TABLET, FILM COATED ORAL at 07:02

## 2019-09-18 RX ADMIN — SODIUM CHLORIDE: 9 INJECTION, SOLUTION INTRAVENOUS at 00:50

## 2019-09-18 RX ADMIN — MILNACIPRAN HYDROCHLORIDE 50 MG: 50 TABLET, FILM COATED ORAL at 20:28

## 2019-09-18 RX ADMIN — ESCITALOPRAM 10 MG: 5 TABLET, FILM COATED ORAL at 08:26

## 2019-09-18 RX ADMIN — HYDROCORTISONE SODIUM SUCCINATE 50 MG: 100 INJECTION, POWDER, FOR SOLUTION INTRAMUSCULAR; INTRAVENOUS at 00:51

## 2019-09-18 RX ADMIN — ACETAMINOPHEN 650 MG: 325 TABLET, FILM COATED ORAL at 08:25

## 2019-09-18 RX ADMIN — HYDROMORPHONE HYDROCHLORIDE 4 MG: 2 TABLET ORAL at 07:02

## 2019-09-18 RX ADMIN — ACETAMINOPHEN 650 MG: 325 TABLET, FILM COATED ORAL at 22:04

## 2019-09-18 RX ADMIN — PIPERACILLIN AND TAZOBACTAM 4.5 G: 4; .5 INJECTION, POWDER, FOR SOLUTION INTRAVENOUS at 10:39

## 2019-09-18 RX ADMIN — HYDROMORPHONE HYDROCHLORIDE 4 MG: 2 TABLET ORAL at 17:40

## 2019-09-18 RX ADMIN — SENNOSIDES AND DOCUSATE SODIUM 2 TABLET: 8.6; 5 TABLET ORAL at 08:25

## 2019-09-18 RX ADMIN — THIAMINE HYDROCHLORIDE 200 MG: 100 INJECTION, SOLUTION INTRAMUSCULAR; INTRAVENOUS at 20:28

## 2019-09-18 RX ADMIN — HYDROMORPHONE HYDROCHLORIDE 0.5 MG: 1 INJECTION, SOLUTION INTRAMUSCULAR; INTRAVENOUS; SUBCUTANEOUS at 16:22

## 2019-09-18 RX ADMIN — CYCLOBENZAPRINE HYDROCHLORIDE 10 MG: 10 TABLET, FILM COATED ORAL at 16:11

## 2019-09-18 RX ADMIN — HEPARIN SODIUM 5000 UNITS: 5000 INJECTION, SOLUTION INTRAVENOUS; SUBCUTANEOUS at 22:04

## 2019-09-18 RX ADMIN — PIPERACILLIN AND TAZOBACTAM 4.5 G: 4; .5 INJECTION, POWDER, FOR SOLUTION INTRAVENOUS at 04:00

## 2019-09-18 RX ADMIN — LAMOTRIGINE 100 MG: 100 TABLET ORAL at 08:26

## 2019-09-18 RX ADMIN — ASCORBIC ACID 1500 MG: 500 INJECTION, SOLUTION INTRAMUSCULAR; INTRAVENOUS; SUBCUTANEOUS at 12:04

## 2019-09-18 RX ADMIN — HYDROCORTISONE SODIUM SUCCINATE 50 MG: 100 INJECTION, POWDER, FOR SOLUTION INTRAMUSCULAR; INTRAVENOUS at 23:56

## 2019-09-18 RX ADMIN — VANCOMYCIN HYDROCHLORIDE 2000 MG: 10 INJECTION, POWDER, LYOPHILIZED, FOR SOLUTION INTRAVENOUS at 08:29

## 2019-09-18 RX ADMIN — HEPARIN SODIUM 5000 UNITS: 5000 INJECTION, SOLUTION INTRAVENOUS; SUBCUTANEOUS at 06:31

## 2019-09-18 RX ADMIN — POLYETHYLENE GLYCOL 3350 17 G: 17 POWDER, FOR SOLUTION ORAL at 16:25

## 2019-09-18 RX ADMIN — MILNACIPRAN HYDROCHLORIDE 50 MG: 50 TABLET, FILM COATED ORAL at 08:25

## 2019-09-18 RX ADMIN — POLYETHYLENE GLYCOL 3350 17 G: 17 POWDER, FOR SOLUTION ORAL at 10:45

## 2019-09-18 RX ADMIN — PIPERACILLIN AND TAZOBACTAM 4.5 G: 4; .5 INJECTION, POWDER, FOR SOLUTION INTRAVENOUS at 22:12

## 2019-09-18 RX ADMIN — HYDROCORTISONE SODIUM SUCCINATE 50 MG: 100 INJECTION, POWDER, FOR SOLUTION INTRAMUSCULAR; INTRAVENOUS at 12:07

## 2019-09-18 RX ADMIN — BUPROPION HYDROCHLORIDE 75 MG: 75 TABLET, FILM COATED ORAL at 20:28

## 2019-09-18 RX ADMIN — HYDROMORPHONE HYDROCHLORIDE 4 MG: 2 TABLET ORAL at 10:26

## 2019-09-18 RX ADMIN — HYDROMORPHONE HYDROCHLORIDE 4 MG: 2 TABLET ORAL at 04:00

## 2019-09-18 RX ADMIN — BUPROPION HYDROCHLORIDE 75 MG: 75 TABLET, FILM COATED ORAL at 08:26

## 2019-09-18 RX ADMIN — ACETAMINOPHEN 650 MG: 325 TABLET, FILM COATED ORAL at 04:00

## 2019-09-18 RX ADMIN — HYDROCORTISONE SODIUM SUCCINATE 50 MG: 100 INJECTION, POWDER, FOR SOLUTION INTRAMUSCULAR; INTRAVENOUS at 06:31

## 2019-09-18 RX ADMIN — GABAPENTIN 300 MG: 300 CAPSULE ORAL at 09:28

## 2019-09-18 RX ADMIN — GABAPENTIN 300 MG: 300 CAPSULE ORAL at 15:46

## 2019-09-18 RX ADMIN — SENNOSIDES AND DOCUSATE SODIUM 1 TABLET: 8.6; 5 TABLET ORAL at 20:28

## 2019-09-18 RX ADMIN — HYDROCORTISONE SODIUM SUCCINATE 50 MG: 100 INJECTION, POWDER, FOR SOLUTION INTRAMUSCULAR; INTRAVENOUS at 17:40

## 2019-09-18 RX ADMIN — HYDROMORPHONE HYDROCHLORIDE 4 MG: 2 TABLET ORAL at 00:51

## 2019-09-18 RX ADMIN — ASCORBIC ACID 1500 MG: 500 INJECTION, SOLUTION INTRAMUSCULAR; INTRAVENOUS; SUBCUTANEOUS at 20:28

## 2019-09-18 RX ADMIN — ACETAMINOPHEN 650 MG: 325 TABLET, FILM COATED ORAL at 17:40

## 2019-09-18 RX ADMIN — PIPERACILLIN AND TAZOBACTAM 4.5 G: 4; .5 INJECTION, POWDER, FOR SOLUTION INTRAVENOUS at 15:50

## 2019-09-18 RX ADMIN — HYDROMORPHONE HYDROCHLORIDE 0.5 MG: 1 INJECTION, SOLUTION INTRAMUSCULAR; INTRAVENOUS; SUBCUTANEOUS at 22:13

## 2019-09-18 RX ADMIN — GABAPENTIN 300 MG: 300 CAPSULE ORAL at 22:04

## 2019-09-18 RX ADMIN — THIAMINE HYDROCHLORIDE 200 MG: 100 INJECTION, SOLUTION INTRAMUSCULAR; INTRAVENOUS at 09:33

## 2019-09-18 ASSESSMENT — ACTIVITIES OF DAILY LIVING (ADL)
ADLS_ACUITY_SCORE: 13
ADLS_ACUITY_SCORE: 14
ADLS_ACUITY_SCORE: 14
ADLS_ACUITY_SCORE: 13
ADLS_ACUITY_SCORE: 14
ADLS_ACUITY_SCORE: 14

## 2019-09-18 ASSESSMENT — MIFFLIN-ST. JEOR: SCORE: 2238
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
## 2019-09-18 NOTE — PROGRESS NOTES
"DATE:  9/18/2019   TIME OF RECEIPT FROM LAB:  0834  LAB TEST:  Gram + organisms  LAB VALUE: unable to identify with verigene study, will have to wait for growth  RESULTS GIVEN WITH READ-BACK TO (PROVIDER):  Notified Dr Land txt-pg:  \"Notifying you critical lab, Gram+ organisms, unable to identify w/ verigene study, waiting for growth\"    TIME LAB VALUE REPORTED TO PROVIDER:   0839  `       "

## 2019-09-18 NOTE — PROGRESS NOTES
Called by nursing staff about positive blood culture results, patient is covered with vancomycin, will continue that for now.  Repeat blood cultures are pending.  Will get another blood culture for today.
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
## 2019-09-18 NOTE — PROVIDER NOTIFICATION
MD Notification    Notified Person: MD    Notified Person Name:  Juancho    Notification Date/Time: 9/18/2019, 0558    Notification Interaction: Text- page    Purpose of Notification: Positive blood culture results.    Orders Received: Order for additional blood culture today.    Comments:

## 2019-09-18 NOTE — PLAN OF CARE
IMC status. A/OX4. VSS except tachy at times. 2LO2. LS clear. Left BKA, NWB on left leg. Brace at bedside. Regular diet, tolerating well. Fitzgerald patent with good output. Tele NSR. Numbness reported in BLE. Left lower leg incision JOSAFAT, WDL. Pain managed moderately with PRN dilaudid and tylenol. Blood cultures obtained from PICC.

## 2019-09-18 NOTE — PROGRESS NOTES
[facility]    Hospitalist Progress Note    Brief Summary:  Juancho Mohan is a 48 year old male who presented on 9/12 for elective left below knee amputation with orthopedic surgery for chronic pain after workers comp injury many years ago. Initially was progressing well after surgery, had been transitioned to PO pain regimen and there were plans to set up home care. On 9/16 RRT was called and he was found to be mottled, cool extremities, lethargic with fever, hypotension, tachycardia, and hypoxia on supplemental O2. He denied any complaints at that time other than some mild dizziness. He also had minimal urine output noted on 9/16. He was placed on bipap, transferred to ICU for monitoring and started on sepsis protocol with vanc/zosyn, IVF resuscitation. He was taken for CT head and CT PE chest to rule out obstructive shock with PE (no findings found). He was extubated 9/17, weaned off levophed. He is now hypertensive and not requiring any supplemental O2. Blood cx found that he had gram + cocci in pairs and chains in both bottles, strep preliminarily.      Assessment & Plan   Juancho Mohan is a 48 year old male who was admitted on 9/12/2019. I was asked to see the patient for transfer of care from ICU, orthopedic surgery remains primary.     S/p Elective left below knee amputation for chronic pain on 9/12/19  Septic shock- resolved  Metabolic encephalopathy secondary to sepsis with gram positive bacteremia- now resolved  Strep bacteremia (gram +), preliminary 2 of 2 blood culture positive.    Initially did well after OR, then had RRT 9/16 requiring initiation of sepsis protocol for likely infection. CT chest showed bilateral lower lobe Pneumonia, blood cultures with gram + cocci in both bottles. He was encephalopathic initially, very unresponsive even after IVF, but has much improved -does not remember most events  however. He is off all pressors and lactate normalized today. WBC  normal, Lactate max was 3.6, now 1.7. Procalcitonin was 93.56,  No trending down. He is afebrile this morning. Conscious, alert and oriented X 3.  Repeat blood culture this morning, Blood culture drawn on 9/17 are negative so far. Echo negative for vegetation.  Source is most likely Pneumonia.     - Post op wound care and pain management per ortho  - Repeat CT scan of left lower extremity for evaluation of knee done, Ortho to review.  -- Remove bernard today   - Continue vitamin C and thiamine per protocols initiated in ICU, continue stress dose hydrocortisone (has hx steroid use), blood pressure is good, on tapering dose.   - Continue vanc/zosyn, await sensitivities of blood cultures.  - Advance diet as tolerated  - PICC line placed on 9/16 while with gram + cocci in blood, if repeat blood culture come back positive, will  Need to remove the PICC line    Overall improve at this time.      Acute hypoxic respiratory failure requiring intubation  Bilateral lower lobe  pneumonia  RRT called 9/16 for o2 sat 82-83% on 6L o2, was placed on bipap, but became unresponsive and required intubation. Transferred to ICU for ongoing cares. Extubated 9/17 AM successfully  - Continue close monitoring on IMC at this time.   - IV abx as above for pneumonia  Start him on aggressive pulmonary toilet with IS and flutter, encourage him to use both.      GERALD  Baseline creatinine near 0.7-0.9. Worsened to 1.77 on day of RRT due to shock. Had poor urine output, now improved after IVF resuscitation, Creatinine 1.09 and trending down, good oral intake, stop IV fluids now.   -- Repeat Cr in AM, hold nephrotoxic medications including combination lisinopril-hydrochlorothiazide.     Mild Anemia  Hgb 9.2 during RRT, improved to 12 this AM after fluid resuscitation. Did have 25ml EBL in OR   - Hemoglobin is stable at this time.      Hx Sarcoidosis  Possible Asthma  Continue PTA breo, prn albuterol inhaler     Hypertension  PTA lisinopril/hctz 20-12.5  BID, hold while with GERALD  - PRN hydralazine IV for SBP>160     Hyperlipidemia  PTA pravastatin 10mg, continue     Adjustment disorder with anxiety and depression  PTA wellbutrin, lamictal and lexapro, both continued while inpatient     Malignant melanoma of right thigh  Maintained on keytruda as outpatient     Hx migraines   Not currently with migraine, uses PRN imitrex at home.     Constipation  Has not had a BM all admission. Has senna-docusate scheduled, added PRN miralax BID  Give one dose of Jo lax today and continue with Senakot      DVT Prophylaxis: Heparin SQ  Code Status: Full Code     Disposition: Expected discharge in per orthopedic surgery service    Remove bernard catheter today.  Decrease IV Hydrocortisone to 25 mg Q every 8 hr  Stop IV fluids  Start on aggressive IS and flutter.   Continue IV Vancomycin and Zosyn at this time, most likely de-escalate from tomorrow once we have the sensitivities results.   Will need at least 2 weeks of antibiotics   Consult PT and OT again.       Timi Land MD  Text Page  (7am - 6pm)    Interval History   Feeling better than yesterday, has some pain to his left leg, no chest pain, SOB, fever, chills, nausea, vomiting, headache or dizziness.     -Data reviewed today: I reviewed all new labs and imaging results over the last 24 hours. I personally reviewed no images or EKG's today.    Physical Exam   Temp: 97.5  F (36.4  C) Temp src: Axillary BP: (!) 165/86 Pulse: 109 Heart Rate: 108 Resp: 20 SpO2: 93 % O2 Device: Nasal cannula Oxygen Delivery: 2 LPM  Vitals:    09/16/19 1800 09/17/19 0515 09/18/19 0600   Weight: 130 kg (286 lb 9.6 oz) 129.7 kg (285 lb 15 oz) 132 kg (291 lb 0.1 oz)     Vital Signs with Ranges  Temp:  [97.3  F (36.3  C)-99.14  F (37.3  C)] 97.5  F (36.4  C)  Pulse:  [] 109  Heart Rate:  [] 108  Resp:  [11-41] 20  BP: (113-166)/(55-97) 165/86  SpO2:  [90 %-100 %] 93 %  I/O last 3 completed shifts:  In: 3374.4 [P.O.:1150; I.V.:2224.4]  Out:  2355 [Urine:2355]    Constitutional: awake, alert, cooperative, no apparent distress, and appears stated age  Eyes: Lids and lashes normal, pupils equal, round and reactive to light, extra ocular muscles intact, sclera clear, conjunctiva normal  Respiratory: decrease air entry bilaterally   Cardiovascular: Normal apical impulse, regular rate and rhythm, normal S1 and S2, no S3 or S4, and no murmur noted  GI: No scars, normal bowel sounds, soft, non-distended, non-tender, no masses palpated, no hepatosplenomegally  Musculoskeletal: no lower extremity pitting edema present, Left BKA  Neurologic: no focal deficit.     Medications       ascorbic acid intermittent infusion  1,500 mg Intravenous Q6H     buPROPion  75 mg Oral BID     escitalopram  10 mg Oral QAM     fluticasone-vilanterol  1 puff Inhalation Daily     gabapentin  300 mg Oral TID     heparin  5,000 Units Subcutaneous Q8H     hydrocortisone sodium succinate PF  50 mg Intravenous Q6H     lamoTRIgine  100 mg Oral BID     milnacipran  50 mg Oral BID     piperacillin-tazobactam  4.5 g Intravenous Q6H     pravastatin  10 mg Oral QPM     senna-docusate  1 tablet Per NG tube BID    Or     senna-docusate  2 tablet Oral BID     sodium chloride (PF)  10 mL Intracatheter Q8H     sodium chloride (PF)  3 mL Intracatheter Q8H     thiamine  200 mg Intravenous BID     vancomycin (VANCOCIN) IV  2,000 mg Intravenous Q12H       Data   Recent Labs   Lab 09/18/19  0645 09/17/19  0605 09/16/19  1521 09/16/19  1345   WBC 7.4 9.6  --  6.9   HGB 9.7* 12.0*  --  9.2*   MCV 87 87  --  88    231  --  195    138 140 140   POTASSIUM 4.1 4.6 4.4 4.1   CHLORIDE 103 102 104 107   CO2 31 30 31 17*   BUN 21 18 16 8   CR 1.09 1.32* 1.77* 0.73   ANIONGAP 3 6 5 16*   QUE 7.8* 7.6* 8.5 7.5*   * 150* 108* 67*   ALBUMIN 2.3* 2.4* 2.8* 1.4*   PROTTOTAL 5.4* 5.9* 6.3* 3.0*   BILITOTAL 0.6 1.0 1.0 0.5   ALKPHOS 76 73 100 50   ALT 29 33 30 14   AST 36 46* 46* 20   TROPI  --   --   --   0.022       Recent Results (from the past 24 hour(s))   CT Knee Left w/o Contrast    Narrative    CT KNEE LEFT WITHOUT CONTRAST September 17, 2019 6:37 PM     HISTORY: Soft tissue infection suspected, knee, initial exam. Evaluate  for abscess, knee effusion, soft tissue gas.    TECHNIQUE: Radiation dose for this scan was reduced using automated  exposure control, adjustment of the mA and/or kV according to patient  size, or iterative reconstruction technique.    FINDINGS: Suprapatellar recess nonspecific joint effusion is noted.  Subcutaneous edema is noted about the leg extending proximal to the  knee. A small bubble of superficial subcutaneous gas is noted anterior  and distal to the tip of the tibial stump. There may be mild irregular  fluid adjacent to the tip of the stump. Punctate calcifications which  could be dystrophic are also noted in this region. No other evidence  of discrete fluid collection. Metallic artifact related to proximal  tibial screws is noted. There is a well-defined ossicle at the  infrapatellar tendon attachment compatible with Osgood-Schlatter  disease. Meniscal chondrocalcinosis is noted. No periarticular osseous  destruction or erosion is demonstrated.      Impression    IMPRESSION:  1. Below-knee amputation with irregular faint punctate calcification  or developing ossification at the tip of the tibial stump. There may  be irregular fluid in this region as well. However, no discrete walled  fluid collection is visible on unenhanced CT.  2. Subcutaneous superficial soft tissue gas anterior and distal to the  tibial stump of indeterminate etiology or significance. This could be  related to an overlying wound, soft tissue infection, or recent soft  tissue penetration or procedure.  3. No evidence of tibial stump erosion or osseous destruction.  4. Meniscal chondrocalcinosis and nonspecific knee joint effusion.    GEOVANNY BARRERA MD

## 2019-09-18 NOTE — PROGRESS NOTES
"SPIRITUAL HEALTH SERVICES Progress Note  [facility]     visited per LOS.    Pt was awake when  arrived, pt's wife was also present in the room.  Pt stated that his  had already visited and they are well connected to their home [employer]. Pt also shared that he attended a support group prior to surgery that he found very helpful, along with a large outpouring of support from their community. Pt and pt's wife agreed that sepsis was \"scary\" but were grateful it was found in time to treat. Pt has strong niru and theology that is helping him cope with these major events.   offered prayer.  [facility] continues to be available as needed.      Mariano Hidalgo   Intern  "

## 2019-09-18 NOTE — PLAN OF CARE
IMC. A/Ox4. VSS ex tachycardic majority of shift. LS clear. +Bs, +flatus, -BM. Fitzgerald patent with adequate output. Incision cdi/kavon. Now has sock, and brace over stump. Managing pain with prn dilaudid, tylenol, and flexeril. On bedrest. NWB to LLE. Tolerating regular diet.

## 2019-09-18 NOTE — PLAN OF CARE
Shift [id number]: IMC Status. High BP at times, tachycardic at times. A/O x 4. Lungs: clear throughout, 2 L O2 NC; pt dyspneic on exertion. BS present, passing flatus, pt had first BM x 1, formed/brown/large--following 2nd dose of Miralax. Fitzgerald removed @ 1600--voided 150 ml since w/ urine occurrence. Diet: regular. Activity: Up w/ one assist to bathroom; GB/Crutches x 2; ambulated a few steps out of room today as well. Pain managed w/ PRN PO Dilaudid--PRN IV Dilaudid given x 1, PRN Tylenol, PRN Flexeril. PICC in place. Brace to LLE to [facility] location.

## 2019-09-19 ENCOUNTER — APPOINTMENT (OUTPATIENT)
Dept: PHYSICAL THERAPY | Facility: CLINIC | Age: 49
DRG: 040 | End: 2019-09-19
Attending: INTERNAL MEDICINE
Payer: OTHER MISCELLANEOUS

## 2019-09-19 LAB
ALBUMIN SERPL-MCNC: 2.3 G/DL (ref 3.4–5)
ALP SERPL-CCNC: 113 U/L (ref 40–150)
ALT SERPL W P-5'-P-CCNC: 52 U/L (ref 0–70)
ANION GAP SERPL CALCULATED.3IONS-SCNC: 4 MMOL/L (ref 3–14)
AST SERPL W P-5'-P-CCNC: 48 U/L (ref 0–45)
BASOPHILS # BLD AUTO: 0 10E9/L (ref 0–0.2)
BASOPHILS NFR BLD AUTO: 0.4 %
BILIRUB SERPL-MCNC: 0.4 MG/DL (ref 0.2–1.3)
BUN SERPL-MCNC: 18 MG/DL (ref 7–30)
CALCIUM SERPL-MCNC: 8.2 MG/DL (ref 8.5–10.1)
CHLORIDE SERPL-SCNC: 107 MMOL/L (ref 94–109)
CO2 SERPL-SCNC: 31 MMOL/L (ref 20–32)
CREAT SERPL-MCNC: 0.92 MG/DL (ref 0.66–1.25)
DIFFERENTIAL METHOD BLD: ABNORMAL
EOSINOPHIL # BLD AUTO: 0.2 10E9/L (ref 0–0.7)
EOSINOPHIL NFR BLD AUTO: 2.5 %
ERYTHROCYTE [DISTWIDTH] IN BLOOD BY AUTOMATED COUNT: 13.2 % (ref 10–15)
GFR SERPL CREATININE-BSD FRML MDRD: >90 ML/MIN/{1.73_M2}
GLUCOSE SERPL-MCNC: 106 MG/DL (ref 70–99)
HCT VFR BLD AUTO: 29.4 % (ref 40–53)
HGB BLD-MCNC: 9.8 G/DL (ref 13.3–17.7)
IMM GRANULOCYTES # BLD: 0.1 10E9/L (ref 0–0.4)
IMM GRANULOCYTES NFR BLD: 1.1 %
LYMPHOCYTES # BLD AUTO: 0.8 10E9/L (ref 0.8–5.3)
LYMPHOCYTES NFR BLD AUTO: 9.7 %
MAGNESIUM SERPL-MCNC: 2.3 MG/DL (ref 1.6–2.3)
MCH RBC QN AUTO: 29 PG (ref 26.5–33)
MCHC RBC AUTO-ENTMCNC: 33.3 G/DL (ref 31.5–36.5)
MCV RBC AUTO: 87 FL (ref 78–100)
MONOCYTES # BLD AUTO: 0.7 10E9/L (ref 0–1.3)
MONOCYTES NFR BLD AUTO: 8 %
NEUTROPHILS # BLD AUTO: 6.5 10E9/L (ref 1.6–8.3)
NEUTROPHILS NFR BLD AUTO: 78.3 %
NRBC # BLD AUTO: 0 10*3/UL
NRBC BLD AUTO-RTO: 0 /100
PHOSPHATE SERPL-MCNC: 3 MG/DL (ref 2.5–4.5)
PLATELET # BLD AUTO: 196 10E9/L (ref 150–450)
POTASSIUM SERPL-SCNC: 3.8 MMOL/L (ref 3.4–5.3)
PROT SERPL-MCNC: 5.9 G/DL (ref 6.8–8.8)
RBC # BLD AUTO: 3.38 10E12/L (ref 4.4–5.9)
SODIUM SERPL-SCNC: 142 MMOL/L (ref 133–144)
VANCOMYCIN SERPL-MCNC: 16 MG/L
WBC # BLD AUTO: 8.3 10E9/L (ref 4–11)

## 2019-09-19 PROCEDURE — 80202 ASSAY OF VANCOMYCIN: CPT | Performed by: INTERNAL MEDICINE

## 2019-09-19 PROCEDURE — 84100 ASSAY OF PHOSPHORUS: CPT | Performed by: INTERNAL MEDICINE

## 2019-09-19 PROCEDURE — 25000128 H RX IP 250 OP 636: Performed by: HOSPITALIST

## 2019-09-19 PROCEDURE — 36593 DECLOT VASCULAR DEVICE: CPT

## 2019-09-19 PROCEDURE — 25000132 ZZH RX MED GY IP 250 OP 250 PS 637: Performed by: HOSPITALIST

## 2019-09-19 PROCEDURE — 99232 SBSQ HOSP IP/OBS MODERATE 35: CPT | Performed by: INTERNAL MEDICINE

## 2019-09-19 PROCEDURE — 25000125 ZZHC RX 250: Performed by: HOSPITALIST

## 2019-09-19 PROCEDURE — 25000128 H RX IP 250 OP 636: Performed by: INTERNAL MEDICINE

## 2019-09-19 PROCEDURE — 80053 COMPREHEN METABOLIC PANEL: CPT | Performed by: INTERNAL MEDICINE

## 2019-09-19 PROCEDURE — 97530 THERAPEUTIC ACTIVITIES: CPT | Mod: GP

## 2019-09-19 PROCEDURE — 12000047 ZZH R&B IMCU

## 2019-09-19 PROCEDURE — 25000132 ZZH RX MED GY IP 250 OP 250 PS 637: Performed by: PHYSICIAN ASSISTANT

## 2019-09-19 PROCEDURE — 40000257 ZZH STATISTIC CONSULT NO CHARGE VASC ACCESS

## 2019-09-19 PROCEDURE — 25800030 ZZH RX IP 258 OP 636: Performed by: HOSPITALIST

## 2019-09-19 PROCEDURE — 97116 GAIT TRAINING THERAPY: CPT | Mod: GP

## 2019-09-19 PROCEDURE — 25000132 ZZH RX MED GY IP 250 OP 250 PS 637: Performed by: ORTHOPAEDIC SURGERY

## 2019-09-19 PROCEDURE — 12000000 ZZH R&B MED SURG/OB

## 2019-09-19 PROCEDURE — 83735 ASSAY OF MAGNESIUM: CPT | Performed by: INTERNAL MEDICINE

## 2019-09-19 PROCEDURE — 40000239 ZZH STATISTIC VAT ROUNDS

## 2019-09-19 PROCEDURE — 97161 PT EVAL LOW COMPLEX 20 MIN: CPT | Mod: GP

## 2019-09-19 PROCEDURE — 85025 COMPLETE CBC W/AUTO DIFF WBC: CPT | Performed by: INTERNAL MEDICINE

## 2019-09-19 PROCEDURE — 25000128 H RX IP 250 OP 636: Performed by: NURSE PRACTITIONER

## 2019-09-19 PROCEDURE — 25000128 H RX IP 250 OP 636: Performed by: ORTHOPAEDIC SURGERY

## 2019-09-19 PROCEDURE — 25800030 ZZH RX IP 258 OP 636: Performed by: NURSE PRACTITIONER

## 2019-09-19 RX ORDER — CEFTRIAXONE 2 G/1
2 INJECTION, POWDER, FOR SOLUTION INTRAMUSCULAR; INTRAVENOUS EVERY 24 HOURS
Status: DISCONTINUED | OUTPATIENT
Start: 2019-09-19 | End: 2019-09-26 | Stop reason: HOSPADM

## 2019-09-19 RX ORDER — GABAPENTIN 300 MG/1
600 CAPSULE ORAL 3 TIMES DAILY
Status: DISCONTINUED | OUTPATIENT
Start: 2019-09-19 | End: 2019-09-26 | Stop reason: HOSPADM

## 2019-09-19 RX ORDER — WATER 10 ML/10ML
INJECTION INTRAMUSCULAR; INTRAVENOUS; SUBCUTANEOUS
Status: DISPENSED
Start: 2019-09-19 | End: 2019-09-20

## 2019-09-19 RX ORDER — GABAPENTIN 300 MG/1
600 CAPSULE ORAL 3 TIMES DAILY
Qty: 180 CAPSULE | Refills: 0 | Status: SHIPPED | OUTPATIENT
Start: 2019-09-19 | End: 2019-09-26

## 2019-09-19 RX ADMIN — GABAPENTIN 600 MG: 300 CAPSULE ORAL at 22:01

## 2019-09-19 RX ADMIN — ASCORBIC ACID 1500 MG: 500 INJECTION, SOLUTION INTRAMUSCULAR; INTRAVENOUS; SUBCUTANEOUS at 09:03

## 2019-09-19 RX ADMIN — HEPARIN SODIUM 5000 UNITS: 5000 INJECTION, SOLUTION INTRAVENOUS; SUBCUTANEOUS at 06:31

## 2019-09-19 RX ADMIN — HYDROCORTISONE SODIUM SUCCINATE 50 MG: 100 INJECTION, POWDER, FOR SOLUTION INTRAMUSCULAR; INTRAVENOUS at 06:31

## 2019-09-19 RX ADMIN — SENNOSIDES AND DOCUSATE SODIUM 2 TABLET: 8.6; 5 TABLET ORAL at 20:32

## 2019-09-19 RX ADMIN — MILNACIPRAN HYDROCHLORIDE 50 MG: 50 TABLET, FILM COATED ORAL at 20:32

## 2019-09-19 RX ADMIN — HYDROMORPHONE HYDROCHLORIDE 4 MG: 2 TABLET ORAL at 16:04

## 2019-09-19 RX ADMIN — BUPROPION HYDROCHLORIDE 75 MG: 75 TABLET, FILM COATED ORAL at 08:26

## 2019-09-19 RX ADMIN — HEPARIN SODIUM 5000 UNITS: 5000 INJECTION, SOLUTION INTRAVENOUS; SUBCUTANEOUS at 20:33

## 2019-09-19 RX ADMIN — HYDROCORTISONE SODIUM SUCCINATE 25 MG: 100 INJECTION, POWDER, FOR SOLUTION INTRAMUSCULAR; INTRAVENOUS at 12:40

## 2019-09-19 RX ADMIN — VANCOMYCIN HYDROCHLORIDE 2000 MG: 10 INJECTION, POWDER, LYOPHILIZED, FOR SOLUTION INTRAVENOUS at 08:22

## 2019-09-19 RX ADMIN — PIPERACILLIN AND TAZOBACTAM 4.5 G: 4; .5 INJECTION, POWDER, FOR SOLUTION INTRAVENOUS at 09:44

## 2019-09-19 RX ADMIN — FLUTICASONE FUROATE AND VILANTEROL TRIFENATATE 1 PUFF: 100; 25 POWDER RESPIRATORY (INHALATION) at 08:29

## 2019-09-19 RX ADMIN — SENNOSIDES AND DOCUSATE SODIUM 1 TABLET: 8.6; 5 TABLET ORAL at 08:26

## 2019-09-19 RX ADMIN — ESCITALOPRAM 10 MG: 5 TABLET, FILM COATED ORAL at 08:26

## 2019-09-19 RX ADMIN — ALTEPLASE 2 MG: 2.2 INJECTION, POWDER, LYOPHILIZED, FOR SOLUTION INTRAVENOUS at 14:01

## 2019-09-19 RX ADMIN — CYCLOBENZAPRINE HYDROCHLORIDE 10 MG: 10 TABLET, FILM COATED ORAL at 18:26

## 2019-09-19 RX ADMIN — CEFTRIAXONE SODIUM 2 G: 2 INJECTION, POWDER, FOR SOLUTION INTRAMUSCULAR; INTRAVENOUS at 12:46

## 2019-09-19 RX ADMIN — CYCLOBENZAPRINE HYDROCHLORIDE 10 MG: 10 TABLET, FILM COATED ORAL at 22:49

## 2019-09-19 RX ADMIN — CYCLOBENZAPRINE HYDROCHLORIDE 10 MG: 10 TABLET, FILM COATED ORAL at 08:26

## 2019-09-19 RX ADMIN — ACETAMINOPHEN 650 MG: 325 TABLET, FILM COATED ORAL at 03:09

## 2019-09-19 RX ADMIN — ACETAMINOPHEN 650 MG: 325 TABLET, FILM COATED ORAL at 08:26

## 2019-09-19 RX ADMIN — HYDROMORPHONE HYDROCHLORIDE 4 MG: 2 TABLET ORAL at 19:54

## 2019-09-19 RX ADMIN — HYDROMORPHONE HYDROCHLORIDE 4 MG: 2 TABLET ORAL at 03:09

## 2019-09-19 RX ADMIN — PRAVASTATIN SODIUM 10 MG: 10 TABLET ORAL at 20:32

## 2019-09-19 RX ADMIN — LAMOTRIGINE 100 MG: 100 TABLET ORAL at 08:26

## 2019-09-19 RX ADMIN — ACETAMINOPHEN 650 MG: 325 TABLET, FILM COATED ORAL at 19:54

## 2019-09-19 RX ADMIN — ASCORBIC ACID 1500 MG: 500 INJECTION, SOLUTION INTRAMUSCULAR; INTRAVENOUS; SUBCUTANEOUS at 22:02

## 2019-09-19 RX ADMIN — THIAMINE HYDROCHLORIDE 200 MG: 100 INJECTION, SOLUTION INTRAMUSCULAR; INTRAVENOUS at 22:02

## 2019-09-19 RX ADMIN — MILNACIPRAN HYDROCHLORIDE 50 MG: 50 TABLET, FILM COATED ORAL at 08:26

## 2019-09-19 RX ADMIN — HYDROMORPHONE HYDROCHLORIDE 4 MG: 2 TABLET ORAL at 06:31

## 2019-09-19 RX ADMIN — ACETAMINOPHEN 650 MG: 325 TABLET, FILM COATED ORAL at 13:02

## 2019-09-19 RX ADMIN — HEPARIN SODIUM 5000 UNITS: 5000 INJECTION, SOLUTION INTRAVENOUS; SUBCUTANEOUS at 13:41

## 2019-09-19 RX ADMIN — CYCLOBENZAPRINE HYDROCHLORIDE 10 MG: 10 TABLET, FILM COATED ORAL at 00:14

## 2019-09-19 RX ADMIN — GABAPENTIN 300 MG: 300 CAPSULE ORAL at 08:26

## 2019-09-19 RX ADMIN — HYDROMORPHONE HYDROCHLORIDE 4 MG: 2 TABLET ORAL at 13:02

## 2019-09-19 RX ADMIN — ASCORBIC ACID 1500 MG: 500 INJECTION, SOLUTION INTRAMUSCULAR; INTRAVENOUS; SUBCUTANEOUS at 16:06

## 2019-09-19 RX ADMIN — THIAMINE HYDROCHLORIDE 200 MG: 100 INJECTION, SOLUTION INTRAMUSCULAR; INTRAVENOUS at 08:29

## 2019-09-19 RX ADMIN — HYDROMORPHONE HYDROCHLORIDE 4 MG: 2 TABLET ORAL at 22:49

## 2019-09-19 RX ADMIN — GABAPENTIN 600 MG: 300 CAPSULE ORAL at 16:04

## 2019-09-19 RX ADMIN — HYDROCORTISONE SODIUM SUCCINATE 25 MG: 100 INJECTION, POWDER, FOR SOLUTION INTRAMUSCULAR; INTRAVENOUS at 18:32

## 2019-09-19 RX ADMIN — PIPERACILLIN AND TAZOBACTAM 4.5 G: 4; .5 INJECTION, POWDER, FOR SOLUTION INTRAVENOUS at 03:09

## 2019-09-19 RX ADMIN — LAMOTRIGINE 100 MG: 100 TABLET ORAL at 20:32

## 2019-09-19 RX ADMIN — BUPROPION HYDROCHLORIDE 75 MG: 75 TABLET, FILM COATED ORAL at 20:32

## 2019-09-19 RX ADMIN — HYDROMORPHONE HYDROCHLORIDE 4 MG: 2 TABLET ORAL at 09:49

## 2019-09-19 RX ADMIN — HYDROCORTISONE SODIUM SUCCINATE 25 MG: 100 INJECTION, POWDER, FOR SOLUTION INTRAMUSCULAR; INTRAVENOUS at 22:50

## 2019-09-19 RX ADMIN — ASCORBIC ACID 1500 MG: 500 INJECTION, SOLUTION INTRAMUSCULAR; INTRAVENOUS; SUBCUTANEOUS at 03:17

## 2019-09-19 ASSESSMENT — ACTIVITIES OF DAILY LIVING (ADL)
ADLS_ACUITY_SCORE: 13

## 2019-09-19 ASSESSMENT — MIFFLIN-ST. JEOR: SCORE: 2250

## 2019-09-19 NOTE — PROVIDER NOTIFICATION
"Txt pg Cali Tate PA: \", Juancho Mohan: Is it okay to use Alteplase for PICC line? Hospitalist, Dr. Land, is deferring to surgery. ASMITA Momin\"    No call back  "

## 2019-09-19 NOTE — PLAN OF CARE
OT - pt sleeping but easily aroused, wife present.  Pt open to therapy but not this AM, wife states he didn't sleep well last night.  Requested OT return later.

## 2019-09-19 NOTE — PLAN OF CARE
IMC status discontinued. A/O x 4. BP elevated at times, Tachycardic at times. Lungs: clear throughout, RA. BS present, passing flatus. No void this shift, but otherwise has been WDL. Diet: Regular, refused breakfast. Activity: Up w/one; GB/crutches x 2, pt slept most of shift r/t not sleeping much overnight. Pain managed w/ PRN PO Dilaudid, PRN Tylenol, PRN Flexeril.

## 2019-09-19 NOTE — PROVIDER NOTIFICATION
Contacted Orthopedics office to get in contact w/ Dr. Mccoy to ask about an okay to use Alteplase for PICC line, this, following not hearing back from txt-pg to SUZY Tate.   said Dr. Mccoy was in surgery here but is going to contact them

## 2019-09-19 NOTE — PROGRESS NOTES
"BRIEF NUTRITION ASSESSMENT      REASON FOR ASSESSMENT:  Juancho Mohan is a 48 year old male seen by Registered Dietitian for LOS      CURRENT DIET AND INTAKE:  Diet:  Regular              Visited with pt this morning (wife lying in bed with pt)  Pt reports good appetite  \"The food has been good\"  Pt has been ordering 2-3 full meals per day  Eating 100%    Pt follows a regular diet at home  No allergies/intolerances  Good appetite PTA    ANTHROPOMETRICS:  Height: 6' 0\"  Weight:(9/19) 133.2 kg /  293 lbs 10.44 oz  Body mass index is 39.83 kg/m .   Weight Status: Obesity Grade II BMI 35-39.9  IBW:  75.2 kg (taking into account left BKA)  Weight History: records reflect wt is up from admit.  Net I/O: -67 mL  Vitals:    09/12/19 1013 09/16/19 1800 09/17/19 0515 09/18/19 0600   Weight: 122 kg (269 lb) 130 kg (286 lb 9.6 oz) 129.7 kg (285 lb 15 oz) 132 kg (291 lb 0.1 oz)    09/19/19 0612   Weight: 133.2 kg (293 lb 10.4 oz)         LABS:  Labs noted    MALNUTRITION:  Patient does not meet two of the following criteria necessary for diagnosing malnutrition: significant weight loss, reduced intake, subcutaneous fat loss, muscle loss or fluid retention.       NUTRITION INTERVENTION:  Nutrition Diagnosis:  No nutrition diagnosis at this time.    Implementation:  Nutrition Education ---> Reviewed current diet order and encouraged protein food at each meal.    FOLLOW UP/MONITORING:   Will re-evaluate in 7 - 10 days, or sooner, if re-consulted.          "

## 2019-09-19 NOTE — PLAN OF CARE
PT:  Orders received and chart reviewed. Patient answered subjective questions and then wishing to rest 2/2 to increased tiredness and fatigue. Patient offers that he would be more willing to participate this PM.

## 2019-09-19 NOTE — PROGRESS NOTES
[facility]    Hospitalist Progress Note    Brief Summary:  Juancho Mohan is a 48 year old male who presented on 9/12 for elective left below knee amputation with orthopedic surgery for chronic pain after workers comp injury many years ago. Initially was progressing well after surgery, had been transitioned to PO pain regimen and there were plans to set up home care. On 9/16 RRT was called and he was found to be mottled, cool extremities, lethargic with fever, hypotension, tachycardia, and hypoxia on supplemental O2. He denied any complaints at that time other than some mild dizziness. He also had minimal urine output noted on 9/16. He was placed on bipap, transferred to ICU for monitoring and started on sepsis protocol with vanc/zosyn, IVF resuscitation. He was taken for CT head and CT PE chest to rule out obstructive shock with PE (no findings found). He was extubated 9/17, weaned off levophed. He is now hypertensive and not requiring any supplemental O2. Blood cx found that he had gram + cocci in pairs and chains in both bottles, strep preliminarily.      Assessment & Plan   Juancho Mohan is a 48 year old male who was admitted on 9/12/2019. I was asked to see the patient for transfer of care from ICU, orthopedic surgery remains primary.     S/p Elective left below knee amputation for chronic pain on 9/12/19  Septic shock- resolved  Metabolic encephalopathy secondary to sepsis with gram positive bacteremia- now resolved  Strep bacteremia (gram +), preliminary 2 of 2 blood culture positive.    Initially did well after OR, then had RRT 9/16 requiring initiation of sepsis protocol for likely infection. CT chest showed bilateral lower lobe Pneumonia, blood cultures with gram + cocci in both bottles. He was encephalopathic initially, very unresponsive even after IVF, but has much improved -does not remember most events  however. He is off all pressors and lactate normalized today. WBC  normal, Lactate max was 3.6, now 1.7. Procalcitonin was 93.56,  No trending down. He is afebrile this morning. Conscious, alert and oriented X 3.  Repeat blood culture this morning, Blood culture drawn on 9/17 are negative so far. Echo negative for vegetation.  Source is most likely Pneumonia.     - Post op wound care and pain management per ortho  - Repeat CT scan of left lower extremity for evaluation of knee done, Ortho to review.  -- Remove bernard 9/18/2019  - Continue vitamin C and thiamine per protocols initiated in ICU, continue stress dose hydrocortisone (has hx steroid use), blood pressure is good, on tapering dose.   - Continue vanc/zosyn, await sensitivities of blood cultures.  - Advance diet as tolerated  - PICC line placed on 9/16 while with gram + cocci in blood, if repeat blood culture come back positive, will  Need to remove the PICC line    Decrease the dose of IV hydrocortisone from 50 to 25 mg from next few doses.  Cultures shows Streptococcus mitis group and streptococcal salivarius group.  Most sensitive to IV ceftriaxone.  At this time I will discontinue the vancomycin and Zosyn.  Start him on IV ceftriaxone 2 g daily         Acute hypoxic respiratory failure requiring intubation: Now resolved currently on room air  Bilateral lower lobe  pneumonia  RRT called 9/16 for o2 sat 82-83% on 6L o2, was placed on bipap, but became unresponsive and required intubation. Transferred to ICU for ongoing cares. Extubated 9/17 AM successfully  -DC [facility] status at this time.  - IV abx as above for pneumonia  Start him on aggressive pulmonary toilet with IS and flutter, encourage him to use both.      GERALD now resolved  Baseline creatinine near 0.7-0.9. Worsened to 1.77 on day of RRT due to shock. Had poor urine output, now improved after IVF resuscitation, creatinine back to normal, good oral intake, stop IV fluids on 9/18/2019  --Creatinine is now back to normal., hold nephrotoxic medications including  combination lisinopril-hydrochlorothiazide.     Mild Anemia  Hgb 9.2 during RRT, improved to 12 this AM after fluid resuscitation. Did have 25ml EBL in OR   - Hemoglobin is stable at this time.      Hx Sarcoidosis  Possible Asthma  Continue PTA breo, prn albuterol inhaler     Hypertension  PTA lisinopril/hctz 20-12.5 BID, hold while with GERALD  - PRN hydralazine IV for SBP>160     Hyperlipidemia  PTA pravastatin 10mg, continue     Adjustment disorder with anxiety and depression  PTA wellbutrin, lamictal and lexapro, both continued while inpatient     Malignant melanoma of right thigh  Maintained on keytruda as outpatient     Hx migraines   Not currently with migraine, uses PRN imitrex at home.     Constipation  Has not had a BM all admission. Has senna-docusate scheduled, added PRN miralax BID  Give one dose of Jo lax today and continue with Senakot      DVT Prophylaxis: Heparin SQ  Code Status: Full Code     Disposition: Expected discharge in per orthopedic surgery service    Stop vancomycin and Zosyn  Start IV ceftriaxone 2 g daily  PT and OT  Pain management as per the orthopedics  Repeat chest x-ray in the morning      Timi Land MD  Text Page  (7am - 6pm)    Interval History   Feeling weak tired and have pain in his left leg.  Denies any chest pain shortness of breath orthopnea PND palpitation no fever chills or cough.    -Data reviewed today: I reviewed all new labs and imaging results over the last 24 hours. I personally reviewed no images or EKG's today.    Physical Exam   Temp: 97.6  F (36.4  C) Temp src: Oral BP: (!) 133/95 Pulse: 62 Heart Rate: 59 Resp: 18 SpO2: 92 % O2 Device: None (Room air) Oxygen Delivery: 2 LPM  Vitals:    09/17/19 0515 09/18/19 0600 09/19/19 0612   Weight: 129.7 kg (285 lb 15 oz) 132 kg (291 lb 0.1 oz) 133.2 kg (293 lb 10.4 oz)     Vital Signs with Ranges  Temp:  [97.6  F (36.4  C)-97.7  F (36.5  C)] 97.6  F (36.4  C)  Pulse:  [] 62  Heart Rate:  [] 59  Resp:  [10-20]  18  BP: (118-165)/(73-97) 133/95  SpO2:  [90 %-98 %] 92 %  I/O last 3 completed shifts:  In: 3354 [P.O.:1120; I.V.:2234]  Out: 950 [Urine:950]    Constitutional: awake, alert, cooperative, no apparent distress, and appears stated age  Eyes: Lids and lashes normal, pupils equal, round and reactive to light, extra ocular muscles intact, sclera clear, conjunctiva normal  Respiratory: Normal bilaterally entry  Cardiovascular: Normal apical impulse, regular rate and rhythm, normal S1 and S2, no S3 or S4, and no murmur noted  GI: No scars, normal bowel sounds, soft, non-distended, non-tender, no masses palpated, no hepatosplenomegally  Musculoskeletal: no lower extremity pitting edema present, Left BKA  Neurologic: no focal deficit.     Medications       ascorbic acid intermittent infusion  1,500 mg Intravenous Q6H     buPROPion  75 mg Oral BID     cefTRIAXone  2 g Intravenous Q24H     escitalopram  10 mg Oral QAM     fluticasone-vilanterol  1 puff Inhalation Daily     gabapentin  600 mg Oral TID     heparin  5,000 Units Subcutaneous Q8H     hydrocortisone sodium succinate PF  25 mg Intravenous Q6H     lamoTRIgine  100 mg Oral BID     milnacipran  50 mg Oral BID     pravastatin  10 mg Oral QPM     senna-docusate  1 tablet Per NG tube BID    Or     senna-docusate  2 tablet Oral BID     sodium chloride (PF)  10 mL Intracatheter Q8H     thiamine  200 mg Intravenous BID       Data   Recent Labs   Lab 09/19/19  0635 09/18/19  0645 09/17/19  0605  09/16/19  1345   WBC 8.3 7.4 9.6  --  6.9   HGB 9.8* 9.7* 12.0*  --  9.2*   MCV 87 87 87  --  88    185 231  --  195    137 138   < > 140   POTASSIUM 3.8 4.1 4.6   < > 4.1   CHLORIDE 107 103 102   < > 107   CO2 31 31 30   < > 17*   BUN 18 21 18   < > 8   CR 0.92 1.09 1.32*   < > 0.73   ANIONGAP 4 3 6   < > 16*   QUE 8.2* 7.8* 7.6*   < > 7.5*   * 107* 150*   < > 67*   ALBUMIN 2.3* 2.3* 2.4*   < > 1.4*   PROTTOTAL 5.9* 5.4* 5.9*   < > 3.0*   BILITOTAL 0.4 0.6 1.0   <  > 0.5   ALKPHOS 113 76 73   < > 50   ALT 52 29 33   < > 14   AST 48* 36 46*   < > 20   TROPI  --   --   --   --  0.022    < > = values in this interval not displayed.       No results found for this or any previous visit (from the past 24 hour(s)).

## 2019-09-19 NOTE — PLAN OF CARE
IMC. A/Ox4. VSS ex HTN at times. Tele: NSR with BBB. LS clear. +Bs, +flatus, +bm. Voiding. Brace to stump. Up with assist x1 gb and crutches. Managing pain with prn dilaudid, tylenol, and flexeril.

## 2019-09-19 NOTE — PLAN OF CARE
VSS on RA hypertensive and tachycardic at times. Lungs: clear throughout, RA. BS present, passing flatus. Tolerating regular diet. Activity: Up w/one; GB/crutches x 2. Pain managed w/ PRN PO Dilaudid, PRN Tylenol. Will continue to monitor pain management needs.

## 2019-09-19 NOTE — PROGRESS NOTES
Follow up call made today to [facility] and spoke with Merna, [phone], fax [phone].  Per Merna they have all the documents they need, have spoken to the  and are ready to deliver products, they just need to speak to patient about delivery. They are anticipating delivering a wheelchair, grab bars and bath seat .     Plan to follow up with Merna on 9/20/19 pending patient's work with therapies.
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
## 2019-09-19 NOTE — PROGRESS NOTES
09/19/19 1000   Quick Adds   Type of Visit Initial PT Evaluation   Living Environment   Lives With spouse   Living Arrangements house   Home Accessibility stairs to enter home   Number of Stairs, Main Entrance 3   Stair Railings, Main Entrance railings on both sides of stairs   Self-Care   Usual Activity Tolerance good   Current Activity Tolerance moderate   Functional Level Prior   Ambulation 0-->independent   Transferring 0-->independent   Toileting 0-->independent   Bathing 0-->independent   Communication 0-->understands/communicates without difficulty   Swallowing 0-->swallows foods/liquids without difficulty   Cognition 0 - no cognition issues reported   Fall history within last six months no   Which of the above functional risks had a recent onset or change? ambulation   General Information   Onset of Illness/Injury or Date of Surgery - Date 09/12/19   Referring Physician Timi Land MD   Patient/Family Goals Statement Return home    Pertinent History of Current Problem (include personal factors and/or comorbidities that impact the POC) Patient admitted on 9/12/19, POD-6 from L BKA due to L chronic ankle and hindfoot pain, CRPS s/p prior subtalar bone block arthrodesis. Patient previously evaluated and discharged from acute PT. However, patient transferred to ICU and subsequently intubated on 9/16/19 for septic shock. Patient extubated on 9/17/19.    Precautions/Limitations fall precautions   Weight-Bearing Status - LLE nonweight-bearing   Weight-Bearing Status - RLE full weight-bearing   General Observations Patient supine in bed upon arrival of therapist, agreeable to working with PT    Cognitive Status Examination   Orientation orientation to person, place and time   Level of Consciousness alert   Follows Commands and Answers Questions 100% of the time;able to follow multistep instructions   Pain Assessment   Patient Currently in Pain No   Integumentary/Edema   Integumentary/Edema Comments  "Dressing intact and brace in place on L LE    Posture    Posture Not impaired   Range of Motion (ROM)   ROM Comment B LE ROM WNL    Strength   Strength Comments Not formally assessed but at least 3+/5 on R LE with transfers and gait    Bed Mobility   Bed Mobility Comments Supine>sit independently    Transfer Skills   Transfer Comments Sit>stand from EOB with crutches and supervision    Gait   Gait Comments Patient ambulated 10 feet with crutches and SBA, steady with swing to gait    Balance   Balance no deficits were identified   General Therapy Interventions   Planned Therapy Interventions gait training   Clinical Impression   Criteria for Skilled Therapeutic Intervention yes, treatment indicated   PT Diagnosis Impaired gait   Influenced by the following impairments weakness, decreased tolerance to activity    Functional limitations due to impairments gait, stairs   Clinical Presentation Stable/Uncomplicated   Clinical Presentation Rationale Based on PMH, current presentation, and social support    Clinical Decision Making (Complexity) Low complexity   Therapy Frequency Daily   Predicted Duration of Therapy Intervention (days/wks) 3 days    Anticipated Discharge Disposition Home with Assist   Risk & Benefits of therapy have been explained Yes   Patient, Family & other staff in agreement with plan of care Yes   [facility] [employer] TM \"6 Clicks\"   2016, Trustees of [facility], under license to [employer].  All rights reserved.   6 Clicks Short Forms Basic Mobility Inpatient Short Form   [facility] [employer]  \"6 Clicks\" V.2 Basic Mobility Inpatient Short Form   1. Turning from your back to your side while in a flat bed without using bedrails? 4 - None   2. Moving from lying on your back to sitting on the side of a flat bed without using bedrails? 3 - A Little   3. Moving to and from a bed to a chair (including a wheelchair)? 3 - A Little   4. Standing up from a chair using your arms (e.g., " wheelchair, or bedside chair)? 3 - A Little   5. To walk in hospital room? 3 - A Little   6. Climbing 3-5 steps with a railing? 2 - A Lot   Basic Mobility Raw Score (Score out of 24.Lower scores equate to lower levels of function) 18   Total Evaluation Time   Total Evaluation Time (Minutes) 8

## 2019-09-19 NOTE — PROVIDER NOTIFICATION
"Txt-pg hospitalist, Dr. Land: \"Does pt need to remain IMC?\"    Response: Pt care order to discontinue IMC orders.  "

## 2019-09-19 NOTE — PLAN OF CARE
Discharge Planner PT   Patient plan for discharge: Home   Current status: Orders received, chart reviewed, PT evaluation completed and treatment initiated. Patient admitted on 9/12/19, POD-6 from L BKA due to L chronic ankle and hindfoot pain, CRPS s/p prior subtalar bone block arthrodesis. Patient previously evaluated and discharged from acute PT. However, patient transferred to ICU and subsequently intubated on 9/16/19 for septic shock. Patient extubated on 9/17/19. Patient lives in a house with 2 steps to enter with bilateral hand rails. Patient is independent at baseline with no AD.     Patient mobilizing/ambulating well with crutches and SBA. Supine<>sit independently, sit<>stand from EOB with crutches and SBA. Patient needing cues to wait for therapist, eager to move. Noted to be taking quick short breaths prior to ambulation, cues for slowed breathing with PLB. SpO2 prior to gait 91% on RA. Patient ambulated 150 feet with crutches and SBA, quick paced gait but steady. Patient noted to be SOB during last bit of gait, short quick breaths noted again following gait. Donned 2L O2 and provided cues for PLB. SpO2 98%, patient wanting to keep O2 on upon therapist exit. All needs in reach and bed alarm on.   Barriers to return to prior living situation: Decreased tolerance to activity, stairs   Recommendations for discharge: Home with A for stair navigation as needed  Rationale for recommendations: Patient mobilizing well with crutches and SBA, limited by decreased tolerance to activity. Anticipate patient will progress to level of A for safe discharge home with A from spouse for stair navigation as needed.        Entered by: Sherry Caceres 09/19/2019 5:04 PM

## 2019-09-19 NOTE — PHARMACY-VANCOMYCIN DOSING SERVICE
Pharmacy Vancomycin Note  Date of Service 2019  Patient's  1970   48 year old, male    Indication: Sepsis, SSTI  Goal Trough Level: 15-20 mg/L  Day of Therapy: 4  Current Vancomycin regimen:  2000 mg IV q12h    Current estimated CrCl = Estimated Creatinine Clearance: 141.6 mL/min (based on SCr of 0.92 mg/dL).    Creatinine for last 3 days  2019:  1:45 PM Creatinine 0.73 mg/dL;  3:21 PM Creatinine 1.77 mg/dL  2019:  6:05 AM Creatinine 1.32 mg/dL  2019:  6:45 AM Creatinine 1.09 mg/dL  2019:  6:35 AM Creatinine 0.92 mg/dL    Recent Vancomycin Levels (past 3 days)  2019:  3:40 PM Vancomycin Level 16.0 mg/L  2019:  6:35 AM Vancomycin Level 16.0 mg/L    Vancomycin IV Administrations (past 72 hours)                   vancomycin (VANCOCIN) 2,000 mg in sodium chloride 0.9 % 500 mL intermittent infusion (mg) 2,000 mg New Bag 19 2028     2,000 mg New Bag  0829     2,000 mg New Bag 19 1955    vancomycin 1500 mg in 0.9% NaCl 250 ml intermittent infusion 1,500 mg (mg) 1,500 mg Given 19 0716     1,500 mg Given 19 2255     1,500 mg Given  1605                Nephrotoxins and other renal medications (From now, onward)    Start     Dose/Rate Route Frequency Ordered Stop    19 1700  vancomycin (VANCOCIN) 2,000 mg in sodium chloride 0.9 % 500 mL intermittent infusion      2,000 mg  over 2 Hours Intravenous EVERY 12 HOURS 19 1636      19 1430  piperacillin-tazobactam (ZOSYN) 4.5 g vial to attach to  mL bag      4.5 g  over 30 Minutes Intravenous EVERY 6 HOURS 19 1427               Contrast Orders - past 72 hours (72h ago, onward)    Start     Dose/Rate Route Frequency Ordered Stop    19 0445  iopamidol (ISOVUE-370) solution 83 mL      83 mL Intravenous ONCE 19 0441 19 0506          Interpretation of levels and current regimen:  Trough level is  Therapeutic    Has serum creatinine changed > 50% in last 72 hours:  No    Urine output:  good urine output    Renal Function: Improving    Plan:  1.  Continue Current Dose  2.  Pharmacy will check trough levels as appropriate in 3-5 Days.    3. Serum creatinine levels will be ordered daily for the first week of therapy and at least twice weekly for subsequent weeks.      Grace Tellez, RositaD        .

## 2019-09-20 ENCOUNTER — APPOINTMENT (OUTPATIENT)
Dept: OCCUPATIONAL THERAPY | Facility: CLINIC | Age: 49
DRG: 040 | End: 2019-09-20
Attending: INTERNAL MEDICINE
Payer: OTHER MISCELLANEOUS

## 2019-09-20 ENCOUNTER — APPOINTMENT (OUTPATIENT)
Dept: GENERAL RADIOLOGY | Facility: CLINIC | Age: 49
DRG: 040 | End: 2019-09-20
Attending: INTERNAL MEDICINE
Payer: OTHER MISCELLANEOUS

## 2019-09-20 LAB
ALBUMIN SERPL-MCNC: 2.3 G/DL (ref 3.4–5)
ALP SERPL-CCNC: 115 U/L (ref 40–150)
ALT SERPL W P-5'-P-CCNC: 61 U/L (ref 0–70)
ANION GAP SERPL CALCULATED.3IONS-SCNC: 5 MMOL/L (ref 3–14)
ANION GAP SERPL CALCULATED.3IONS-SCNC: 7 MMOL/L (ref 3–14)
AST SERPL W P-5'-P-CCNC: 35 U/L (ref 0–45)
BASE EXCESS BLDA CALC-SCNC: 3 MMOL/L
BASE EXCESS BLDV CALC-SCNC: 4.8 MMOL/L
BILIRUB SERPL-MCNC: 0.4 MG/DL (ref 0.2–1.3)
BUN SERPL-MCNC: 15 MG/DL (ref 7–30)
BUN SERPL-MCNC: 18 MG/DL (ref 7–30)
CALCIUM SERPL-MCNC: 8 MG/DL (ref 8.5–10.1)
CALCIUM SERPL-MCNC: 8.4 MG/DL (ref 8.5–10.1)
CHLORIDE SERPL-SCNC: 106 MMOL/L (ref 94–109)
CHLORIDE SERPL-SCNC: 107 MMOL/L (ref 94–109)
CO2 SERPL-SCNC: 28 MMOL/L (ref 20–32)
CO2 SERPL-SCNC: 30 MMOL/L (ref 20–32)
CREAT SERPL-MCNC: 0.85 MG/DL (ref 0.66–1.25)
CREAT SERPL-MCNC: 0.87 MG/DL (ref 0.66–1.25)
ERYTHROCYTE [DISTWIDTH] IN BLOOD BY AUTOMATED COUNT: 13.3 % (ref 10–15)
GFR SERPL CREATININE-BSD FRML MDRD: >90 ML/MIN/{1.73_M2}
GFR SERPL CREATININE-BSD FRML MDRD: >90 ML/MIN/{1.73_M2}
GLUCOSE BLDC GLUCOMTR-MCNC: 147 MG/DL (ref 70–99)
GLUCOSE SERPL-MCNC: 128 MG/DL (ref 70–99)
GLUCOSE SERPL-MCNC: 155 MG/DL (ref 70–99)
HCO3 BLD-SCNC: 26 MMOL/L (ref 21–28)
HCO3 BLDV-SCNC: 29 MMOL/L (ref 21–28)
HCT VFR BLD AUTO: 29.9 % (ref 40–53)
HGB BLD-MCNC: 9.8 G/DL (ref 13.3–17.7)
LACTATE BLD-SCNC: 0.6 MMOL/L (ref 0.7–2)
MAGNESIUM SERPL-MCNC: 2.2 MG/DL (ref 1.6–2.3)
MCH RBC QN AUTO: 28.4 PG (ref 26.5–33)
MCHC RBC AUTO-ENTMCNC: 32.8 G/DL (ref 31.5–36.5)
MCV RBC AUTO: 87 FL (ref 78–100)
MRSA DNA SPEC QL NAA+PROBE: NEGATIVE
NT-PROBNP SERPL-MCNC: 2169 PG/ML (ref 0–450)
O2/TOTAL GAS SETTING VFR VENT: ABNORMAL %
OXYHGB MFR BLD: 98 % (ref 92–100)
OXYHGB MFR BLDV: 81 %
PCO2 BLD: 34 MM HG (ref 35–45)
PCO2 BLDV: 38 MM HG (ref 40–50)
PH BLD: 7.5 PH (ref 7.35–7.45)
PH BLDV: 7.48 PH (ref 7.32–7.43)
PHOSPHATE SERPL-MCNC: 2.8 MG/DL (ref 2.5–4.5)
PLATELET # BLD AUTO: 235 10E9/L (ref 150–450)
PO2 BLD: 142 MM HG (ref 80–105)
PO2 BLDV: 44 MM HG (ref 25–47)
POTASSIUM SERPL-SCNC: 3.1 MMOL/L (ref 3.4–5.3)
POTASSIUM SERPL-SCNC: 3.1 MMOL/L (ref 3.4–5.3)
POTASSIUM SERPL-SCNC: 3.2 MMOL/L (ref 3.4–5.3)
PROCALCITONIN SERPL-MCNC: 11.88 NG/ML
PROT SERPL-MCNC: 5.6 G/DL (ref 6.8–8.8)
RBC # BLD AUTO: 3.45 10E12/L (ref 4.4–5.9)
SODIUM SERPL-SCNC: 141 MMOL/L (ref 133–144)
SODIUM SERPL-SCNC: 142 MMOL/L (ref 133–144)
SPECIMEN SOURCE: NORMAL
WBC # BLD AUTO: 12.1 10E9/L (ref 4–11)

## 2019-09-20 PROCEDURE — 84145 PROCALCITONIN (PCT): CPT | Performed by: HOSPITALIST

## 2019-09-20 PROCEDURE — 36415 COLL VENOUS BLD VENIPUNCTURE: CPT | Performed by: HOSPITALIST

## 2019-09-20 PROCEDURE — 25000128 H RX IP 250 OP 636: Performed by: HOSPITALIST

## 2019-09-20 PROCEDURE — 71045 X-RAY EXAM CHEST 1 VIEW: CPT | Mod: 76

## 2019-09-20 PROCEDURE — 12000047 ZZH R&B IMCU

## 2019-09-20 PROCEDURE — 83605 ASSAY OF LACTIC ACID: CPT | Performed by: INTERNAL MEDICINE

## 2019-09-20 PROCEDURE — 87640 STAPH A DNA AMP PROBE: CPT | Performed by: INTERNAL MEDICINE

## 2019-09-20 PROCEDURE — 84100 ASSAY OF PHOSPHORUS: CPT | Performed by: HOSPITALIST

## 2019-09-20 PROCEDURE — 80053 COMPREHEN METABOLIC PANEL: CPT | Performed by: HOSPITALIST

## 2019-09-20 PROCEDURE — 40000275 ZZH STATISTIC RCP TIME EA 10 MIN

## 2019-09-20 PROCEDURE — 87641 MR-STAPH DNA AMP PROBE: CPT | Performed by: INTERNAL MEDICINE

## 2019-09-20 PROCEDURE — 25000125 ZZHC RX 250: Performed by: INTERNAL MEDICINE

## 2019-09-20 PROCEDURE — 25000132 ZZH RX MED GY IP 250 OP 250 PS 637: Performed by: ORTHOPAEDIC SURGERY

## 2019-09-20 PROCEDURE — 25000132 ZZH RX MED GY IP 250 OP 250 PS 637: Performed by: INTERNAL MEDICINE

## 2019-09-20 PROCEDURE — 25800030 ZZH RX IP 258 OP 636: Performed by: HOSPITALIST

## 2019-09-20 PROCEDURE — 97165 OT EVAL LOW COMPLEX 30 MIN: CPT | Mod: GO | Performed by: OCCUPATIONAL THERAPIST

## 2019-09-20 PROCEDURE — 25800030 ZZH RX IP 258 OP 636: Performed by: ORTHOPAEDIC SURGERY

## 2019-09-20 PROCEDURE — 40000257 ZZH STATISTIC CONSULT NO CHARGE VASC ACCESS

## 2019-09-20 PROCEDURE — 84132 ASSAY OF SERUM POTASSIUM: CPT | Performed by: INTERNAL MEDICINE

## 2019-09-20 PROCEDURE — 25000132 ZZH RX MED GY IP 250 OP 250 PS 637: Performed by: HOSPITALIST

## 2019-09-20 PROCEDURE — 85027 COMPLETE CBC AUTOMATED: CPT | Performed by: HOSPITALIST

## 2019-09-20 PROCEDURE — 36415 COLL VENOUS BLD VENIPUNCTURE: CPT | Performed by: INTERNAL MEDICINE

## 2019-09-20 PROCEDURE — 83735 ASSAY OF MAGNESIUM: CPT | Performed by: HOSPITALIST

## 2019-09-20 PROCEDURE — 25000128 H RX IP 250 OP 636: Performed by: ORTHOPAEDIC SURGERY

## 2019-09-20 PROCEDURE — 99233 SBSQ HOSP IP/OBS HIGH 50: CPT | Performed by: INTERNAL MEDICINE

## 2019-09-20 PROCEDURE — 00000146 ZZHCL STATISTIC GLUCOSE BY METER IP

## 2019-09-20 PROCEDURE — 80048 BASIC METABOLIC PNL TOTAL CA: CPT | Performed by: INTERNAL MEDICINE

## 2019-09-20 PROCEDURE — 94660 CPAP INITIATION&MGMT: CPT

## 2019-09-20 PROCEDURE — 40000141 ZZH STATISTIC PERIPHERAL IV START W/O US GUIDANCE

## 2019-09-20 PROCEDURE — 82805 BLOOD GASES W/O2 SATURATION: CPT | Performed by: INTERNAL MEDICINE

## 2019-09-20 PROCEDURE — 25000128 H RX IP 250 OP 636: Performed by: INTERNAL MEDICINE

## 2019-09-20 PROCEDURE — 97535 SELF CARE MNGMENT TRAINING: CPT | Mod: GO | Performed by: OCCUPATIONAL THERAPIST

## 2019-09-20 PROCEDURE — 94640 AIRWAY INHALATION TREATMENT: CPT

## 2019-09-20 PROCEDURE — 71045 X-RAY EXAM CHEST 1 VIEW: CPT

## 2019-09-20 PROCEDURE — 12000000 ZZH R&B MED SURG/OB

## 2019-09-20 PROCEDURE — 82805 BLOOD GASES W/O2 SATURATION: CPT | Performed by: HOSPITALIST

## 2019-09-20 PROCEDURE — 83880 ASSAY OF NATRIURETIC PEPTIDE: CPT | Performed by: HOSPITALIST

## 2019-09-20 PROCEDURE — 25000125 ZZHC RX 250: Performed by: HOSPITALIST

## 2019-09-20 RX ORDER — ACETAMINOPHEN 325 MG/1
650 TABLET ORAL EVERY 4 HOURS PRN
Status: DISCONTINUED | OUTPATIENT
Start: 2019-09-20 | End: 2019-09-26 | Stop reason: HOSPADM

## 2019-09-20 RX ORDER — POTASSIUM CHLORIDE 7.45 MG/ML
10 INJECTION INTRAVENOUS
Status: DISCONTINUED | OUTPATIENT
Start: 2019-09-20 | End: 2019-09-26 | Stop reason: HOSPADM

## 2019-09-20 RX ORDER — NITROGLYCERIN 0.4 MG/1
0.4 TABLET SUBLINGUAL EVERY 5 MIN PRN
Status: DISCONTINUED | OUTPATIENT
Start: 2019-09-20 | End: 2019-09-23 | Stop reason: CLARIF

## 2019-09-20 RX ORDER — MAGNESIUM SULFATE HEPTAHYDRATE 40 MG/ML
4 INJECTION, SOLUTION INTRAVENOUS EVERY 4 HOURS PRN
Status: DISCONTINUED | OUTPATIENT
Start: 2019-09-20 | End: 2019-09-26 | Stop reason: HOSPADM

## 2019-09-20 RX ORDER — POTASSIUM CHLORIDE 1500 MG/1
40 TABLET, EXTENDED RELEASE ORAL ONCE
Status: COMPLETED | OUTPATIENT
Start: 2019-09-20 | End: 2019-09-20

## 2019-09-20 RX ORDER — POTASSIUM CL/LIDO/0.9 % NACL 10MEQ/0.1L
10 INTRAVENOUS SOLUTION, PIGGYBACK (ML) INTRAVENOUS
Status: DISCONTINUED | OUTPATIENT
Start: 2019-09-20 | End: 2019-09-26 | Stop reason: HOSPADM

## 2019-09-20 RX ORDER — FUROSEMIDE 10 MG/ML
40 INJECTION INTRAMUSCULAR; INTRAVENOUS ONCE
Status: COMPLETED | OUTPATIENT
Start: 2019-09-20 | End: 2019-09-20

## 2019-09-20 RX ORDER — POTASSIUM CHLORIDE 29.8 MG/ML
20 INJECTION INTRAVENOUS
Status: DISCONTINUED | OUTPATIENT
Start: 2019-09-20 | End: 2019-09-26 | Stop reason: HOSPADM

## 2019-09-20 RX ORDER — METHYLPREDNISOLONE SODIUM SUCCINATE 40 MG/ML
40 INJECTION, POWDER, LYOPHILIZED, FOR SOLUTION INTRAMUSCULAR; INTRAVENOUS EVERY 12 HOURS
Status: DISCONTINUED | OUTPATIENT
Start: 2019-09-20 | End: 2019-09-20

## 2019-09-20 RX ORDER — IPRATROPIUM BROMIDE AND ALBUTEROL SULFATE 2.5; .5 MG/3ML; MG/3ML
3 SOLUTION RESPIRATORY (INHALATION)
Status: DISCONTINUED | OUTPATIENT
Start: 2019-09-20 | End: 2019-09-25

## 2019-09-20 RX ORDER — POTASSIUM CHLORIDE 1500 MG/1
20-40 TABLET, EXTENDED RELEASE ORAL
Status: DISCONTINUED | OUTPATIENT
Start: 2019-09-20 | End: 2019-09-26 | Stop reason: HOSPADM

## 2019-09-20 RX ORDER — POTASSIUM CHLORIDE 1.5 G/1.58G
20-40 POWDER, FOR SOLUTION ORAL
Status: DISCONTINUED | OUTPATIENT
Start: 2019-09-20 | End: 2019-09-26 | Stop reason: HOSPADM

## 2019-09-20 RX ORDER — METHYLPREDNISOLONE SODIUM SUCCINATE 40 MG/ML
40 INJECTION, POWDER, LYOPHILIZED, FOR SOLUTION INTRAMUSCULAR; INTRAVENOUS EVERY 8 HOURS
Status: DISCONTINUED | OUTPATIENT
Start: 2019-09-20 | End: 2019-09-22

## 2019-09-20 RX ORDER — LIDOCAINE 40 MG/G
CREAM TOPICAL
Status: DISCONTINUED | OUTPATIENT
Start: 2019-09-20 | End: 2019-09-20

## 2019-09-20 RX ADMIN — HEPARIN SODIUM 5000 UNITS: 5000 INJECTION, SOLUTION INTRAVENOUS; SUBCUTANEOUS at 13:55

## 2019-09-20 RX ADMIN — THIAMINE HYDROCHLORIDE 200 MG: 100 INJECTION, SOLUTION INTRAMUSCULAR; INTRAVENOUS at 23:37

## 2019-09-20 RX ADMIN — MILNACIPRAN HYDROCHLORIDE 50 MG: 50 TABLET, FILM COATED ORAL at 08:59

## 2019-09-20 RX ADMIN — ACETAMINOPHEN 650 MG: 325 TABLET ORAL at 20:35

## 2019-09-20 RX ADMIN — METHYLPREDNISOLONE SODIUM SUCCINATE 40 MG: 40 INJECTION, POWDER, FOR SOLUTION INTRAMUSCULAR; INTRAVENOUS at 17:49

## 2019-09-20 RX ADMIN — IPRATROPIUM BROMIDE AND ALBUTEROL SULFATE 3 ML: .5; 3 SOLUTION RESPIRATORY (INHALATION) at 19:33

## 2019-09-20 RX ADMIN — CYCLOBENZAPRINE HYDROCHLORIDE 10 MG: 10 TABLET, FILM COATED ORAL at 22:15

## 2019-09-20 RX ADMIN — HYDROMORPHONE HYDROCHLORIDE 4 MG: 2 TABLET ORAL at 14:38

## 2019-09-20 RX ADMIN — ACETAMINOPHEN 650 MG: 325 TABLET ORAL at 10:29

## 2019-09-20 RX ADMIN — THIAMINE HYDROCHLORIDE 200 MG: 100 INJECTION, SOLUTION INTRAMUSCULAR; INTRAVENOUS at 13:46

## 2019-09-20 RX ADMIN — POTASSIUM CHLORIDE 40 MEQ: 1500 TABLET, EXTENDED RELEASE ORAL at 08:59

## 2019-09-20 RX ADMIN — SENNOSIDES AND DOCUSATE SODIUM 2 TABLET: 8.6; 5 TABLET ORAL at 20:36

## 2019-09-20 RX ADMIN — BUPROPION HYDROCHLORIDE 75 MG: 75 TABLET, FILM COATED ORAL at 08:59

## 2019-09-20 RX ADMIN — GABAPENTIN 600 MG: 300 CAPSULE ORAL at 08:58

## 2019-09-20 RX ADMIN — ASCORBIC ACID 1500 MG: 500 INJECTION, SOLUTION INTRAMUSCULAR; INTRAVENOUS; SUBCUTANEOUS at 10:38

## 2019-09-20 RX ADMIN — SENNOSIDES AND DOCUSATE SODIUM 2 TABLET: 8.6; 5 TABLET ORAL at 08:59

## 2019-09-20 RX ADMIN — GABAPENTIN 600 MG: 300 CAPSULE ORAL at 22:15

## 2019-09-20 RX ADMIN — PRAVASTATIN SODIUM 10 MG: 10 TABLET ORAL at 20:36

## 2019-09-20 RX ADMIN — ESCITALOPRAM 10 MG: 5 TABLET, FILM COATED ORAL at 08:59

## 2019-09-20 RX ADMIN — HYDROCORTISONE SODIUM SUCCINATE 25 MG: 100 INJECTION, POWDER, FOR SOLUTION INTRAMUSCULAR; INTRAVENOUS at 06:10

## 2019-09-20 RX ADMIN — ASCORBIC ACID 1500 MG: 500 INJECTION, SOLUTION INTRAMUSCULAR; INTRAVENOUS; SUBCUTANEOUS at 16:22

## 2019-09-20 RX ADMIN — ACETAMINOPHEN 650 MG: 325 TABLET ORAL at 14:38

## 2019-09-20 RX ADMIN — MILNACIPRAN HYDROCHLORIDE 50 MG: 50 TABLET, FILM COATED ORAL at 20:35

## 2019-09-20 RX ADMIN — HYDROMORPHONE HYDROCHLORIDE 0.5 MG: 1 INJECTION, SOLUTION INTRAMUSCULAR; INTRAVENOUS; SUBCUTANEOUS at 18:11

## 2019-09-20 RX ADMIN — CYCLOBENZAPRINE HYDROCHLORIDE 10 MG: 10 TABLET, FILM COATED ORAL at 08:59

## 2019-09-20 RX ADMIN — LAMOTRIGINE 100 MG: 100 TABLET ORAL at 08:59

## 2019-09-20 RX ADMIN — FUROSEMIDE 40 MG: 10 INJECTION, SOLUTION INTRAVENOUS at 10:30

## 2019-09-20 RX ADMIN — POTASSIUM CHLORIDE 20 MEQ: 1500 TABLET, EXTENDED RELEASE ORAL at 14:38

## 2019-09-20 RX ADMIN — FLUTICASONE FUROATE AND VILANTEROL TRIFENATATE 1 PUFF: 100; 25 POWDER RESPIRATORY (INHALATION) at 09:07

## 2019-09-20 RX ADMIN — HYDROMORPHONE HYDROCHLORIDE 4 MG: 2 TABLET ORAL at 06:10

## 2019-09-20 RX ADMIN — HEPARIN SODIUM 5000 UNITS: 5000 INJECTION, SOLUTION INTRAVENOUS; SUBCUTANEOUS at 06:10

## 2019-09-20 RX ADMIN — CEFTRIAXONE SODIUM 2 G: 2 INJECTION, POWDER, FOR SOLUTION INTRAMUSCULAR; INTRAVENOUS at 14:16

## 2019-09-20 RX ADMIN — BUPROPION HYDROCHLORIDE 75 MG: 75 TABLET, FILM COATED ORAL at 20:36

## 2019-09-20 RX ADMIN — HYDROMORPHONE HYDROCHLORIDE 0.5 MG: 1 INJECTION, SOLUTION INTRAMUSCULAR; INTRAVENOUS; SUBCUTANEOUS at 20:29

## 2019-09-20 RX ADMIN — GABAPENTIN 600 MG: 300 CAPSULE ORAL at 16:23

## 2019-09-20 RX ADMIN — HYDROMORPHONE HYDROCHLORIDE 4 MG: 2 TABLET ORAL at 23:36

## 2019-09-20 RX ADMIN — HYDROMORPHONE HYDROCHLORIDE 0.5 MG: 1 INJECTION, SOLUTION INTRAMUSCULAR; INTRAVENOUS; SUBCUTANEOUS at 16:18

## 2019-09-20 RX ADMIN — HYDROMORPHONE HYDROCHLORIDE 4 MG: 2 TABLET ORAL at 10:29

## 2019-09-20 RX ADMIN — HYDROMORPHONE HYDROCHLORIDE 4 MG: 2 TABLET ORAL at 03:05

## 2019-09-20 RX ADMIN — LAMOTRIGINE 100 MG: 100 TABLET ORAL at 20:36

## 2019-09-20 RX ADMIN — ASCORBIC ACID 1500 MG: 500 INJECTION, SOLUTION INTRAMUSCULAR; INTRAVENOUS; SUBCUTANEOUS at 03:05

## 2019-09-20 RX ADMIN — HEPARIN SODIUM 5000 UNITS: 5000 INJECTION, SOLUTION INTRAVENOUS; SUBCUTANEOUS at 20:36

## 2019-09-20 RX ADMIN — VANCOMYCIN HYDROCHLORIDE 2000 MG: 5 INJECTION, POWDER, LYOPHILIZED, FOR SOLUTION INTRAVENOUS at 12:14

## 2019-09-20 ASSESSMENT — ACTIVITIES OF DAILY LIVING (ADL)
ADLS_ACUITY_SCORE: 13
ADLS_ACUITY_SCORE: 13
PREVIOUS_RESPONSIBILITIES: DRIVING;WORK
ADLS_ACUITY_SCORE: 13

## 2019-09-20 NOTE — PLAN OF CARE
PT- Attempted to see pt this AM. Pt declined any PT at this time stating that he is very fatigued and has extreme SOB with any movement. Following multiple attempts to motivate pt, pt declined.

## 2019-09-20 NOTE — PROVIDER NOTIFICATION
Text page to Dr. Land  Please call re update on pt status.      Pt requiring increased O2, currently on 4L NC with sats 92%  RR 24-26  WBC increased to 12.1  Sepsis protocol fired  Lactic result: 0.6    Per IV team PICC placement not ideal, was pulled out part way. Also, it was placed while blood cultures were positive.    RESPONSE from Dr. Land:  Will place orders for dieretic   PICC can come out, need peripheral access

## 2019-09-20 NOTE — PROGRESS NOTES
[facility]    Hospitalist Progress Note    Brief Summary:  Juancho Mohan is a 48 year old male who presented on 9/12 for elective left below knee amputation with orthopedic surgery for chronic pain after workers comp injury many years ago. Initially was progressing well after surgery, had been transitioned to PO pain regimen and there were plans to set up home care. On 9/16 RRT was called and he was found to be mottled, cool extremities, lethargic with fever, hypotension, tachycardia, and hypoxia on supplemental O2. He denied any complaints at that time other than some mild dizziness. He also had minimal urine output noted on 9/16. He was placed on bipap, transferred to ICU for monitoring and started on sepsis protocol with vanc/zosyn, IVF resuscitation. He was taken for CT head and CT PE chest to rule out obstructive shock with PE (no findings found). He was extubated 9/17, weaned off levophed. He is now hypertensive and not requiring any supplemental O2. Blood cx found that he had gram + cocci in pairs and chains in both bottles, strep preliminarily.      Assessment & Plan   Juancho Mohan is a 48 year old male who was admitted on 9/12/2019. I was asked to see the patient for transfer of care from ICU, orthopedic surgery remains primary.     S/p Elective left below knee amputation for chronic pain on 9/12/19  Septic shock- resolved  Metabolic encephalopathy secondary to sepsis with gram positive bacteremia- now resolved  Strep bacteremia (gram +), preliminary 2 of 2 blood culture positive.    Initially did well after OR, then had RRT 9/16 requiring initiation of sepsis protocol for likely infection. CT chest showed bilateral lower lobe Pneumonia, blood cultures with gram + cocci in both bottles. He was encephalopathic initially, very unresponsive even after IVF, but has much improved -does not remember most events  however. He is off all pressors and lactate normalized today. WBC  normal, Lactate max was 3.6, now 1.7. Procalcitonin was 93.56,  No trending down. He is afebrile this morning. Conscious, alert and oriented X 3.  Repeat blood culture this morning, Blood culture drawn on 9/17 are negative so far. Echo negative for vegetation.  Source is most likely Pneumonia.     - Post op wound care and pain management per ortho  - Repeat CT scan of left lower extremity for evaluation of knee done, Ortho to review.  -- Remove bernard 9/18/2019  - Continue vitamin C and thiamine per protocols initiated in ICU, continue stress dose hydrocortisone (has hx steroid use), blood pressure is good, on tapering dose.   - Continue vanc/zosyn, await sensitivities of blood cultures.  - Advance diet as tolerated  - PICC line placed on 9/16 while with gram + cocci in blood, if repeat blood culture come back positive, will  Need to remove the PICC line    Stove  IV [id number] mg today.  Cultures shows Streptococcus mitis group and streptococcal salivarius group.  Most sensitive to IV ceftriaxone.  At this time I will discontinue the vancomycin and Zosyn.  Start him on IV ceftriaxone 2 g daily, chest xray shows worsening pneumonia, I will keep him on Vancomycin for now.          Acute hypoxic respiratory failure requiring intubation:   Bilateral lower lobe  pneumonia  RRT called 9/16 for o2 sat 82-83% on 6L o2, was placed on bipap, but became unresponsive and required intubation. Transferred to ICU for ongoing cares. Extubated 9/17 AM successfully  -DC IMC status at this time.  - IV abx as above for pneumonia  Start him on aggressive pulmonary toilet with IS and flutter, encourage him to use both.   He was off oxygen on 9/19, now with worsening SOB, check her Xray chest today, shows worsening infiltrate, Put back on Vancomycin and Continue with IV ceftriaxone.   BNP increased, and Weight increase 10 kg since 9/12. I will like to keep him on the dryer side, will stop IV Steroids, start on IV Lasix 40 mg X 1 and  will see how he does.      GERALD now resolved  Baseline creatinine near 0.7-0.9. Worsened to 1.77 on day of RRT due to shock. Had poor urine output, now improved after IVF resuscitation, creatinine back to normal, good oral intake, stop IV fluids on 9/18/2019  --Creatinine is now back to normal., hold nephrotoxic medications including combination lisinopril-hydrochlorothiazide.     Mild Anemia  Hgb 9.2 during RRT, improved to 12 this AM after fluid resuscitation. Did have 25ml EBL in OR   - Hemoglobin is stable at this time.      Hx Sarcoidosis  Possible Asthma  Continue PTA breo, prn albuterol inhaler     Hypertension  PTA lisinopril/hctz 20-12.5 BID, hold while with GERALD  - PRN hydralazine IV for SBP>160     Hyperlipidemia  PTA pravastatin 10mg, continue     Adjustment disorder with anxiety and depression  PTA wellbutrin, lamictal and lexapro, both continued while inpatient     Malignant melanoma of right thigh  Maintained on keytruda as outpatient     Hx migraines   Not currently with migraine, uses PRN imitrex at home.     Constipation  Has not had a BM all admission. Has senna-docusate scheduled, added PRN miralax BID  Give one dose of Jo lax today and continue with Senakot      DVT Prophylaxis: Heparin SQ  Code Status: Full Code     Disposition: Expected discharge in per orthopedic surgery service    Restart Vancomycin.   Continue IVV ceftriaxone 2 g daily  Give dose of IV lasix 40  Mg X 1 now  PT and OT  Pain management as per the orthopedics  Repeat chest x-ray this morning reviewed.  Continue aggressive IS and flutter       Timi Land MD  Text Page  (7am - 6pm)    Interval History   Patient more SOB this morning, and hypoxic again need oxygen overnight and this morning, feeling weak, tired and some SOB. No fever, chills, chest pain, nausea or vomiting, pain is some what better.     Overnight event reviewed with the nursing staff taking care of the patient.     -Data reviewed today: I reviewed all new labs  and imaging results over the last 24 hours. I personally reviewed no images or EKG's today.    Physical Exam   Temp: 98.3  F (36.8  C) Temp src: Oral BP: 130/82 Pulse: 71 Heart Rate: 75 Resp: 26 SpO2: 90 % O2 Device: Nasal cannula Oxygen Delivery: 4 LPM  Vitals:    09/17/19 0515 09/18/19 0600 09/19/19 0612   Weight: 129.7 kg (285 lb 15 oz) 132 kg (291 lb 0.1 oz) 133.2 kg (293 lb 10.4 oz)     Vital Signs with Ranges  Temp:  [97.3  F (36.3  C)-98.4  F (36.9  C)] 98.3  F (36.8  C)  Pulse:  [65-83] 71  Heart Rate:  [62-84] 75  Resp:  [18-26] 26  BP: (115-158)/(74-98) 130/82  SpO2:  [90 %-97 %] 90 %  I/O last 3 completed shifts:  In: 470 [P.O.:470]  Out: 400 [Urine:400]    Constitutional: awake, alert, cooperative, no apparent distress, and appears stated age  Eyes: Lids and lashes normal, pupils equal, round and reactive to light, extra ocular muscles intact, sclera clear, conjunctiva normal  Respiratory:  Crackles postive at the bases bilaterally.  Cardiovascular: Normal apical impulse, regular rate and rhythm, normal S1 and S2, no S3 or S4, and no murmur noted  GI: No scars, normal bowel sounds, soft, non-distended, non-tender, no masses palpated, no hepatosplenomegally  Musculoskeletal: no lower extremity pitting edema present, Left BKA  Neurologic: no focal deficit.     Medications       ascorbic acid intermittent infusion  1,500 mg Intravenous Q6H     buPROPion  75 mg Oral BID     cefTRIAXone  2 g Intravenous Q24H     escitalopram  10 mg Oral QAM     fluticasone-vilanterol  1 puff Inhalation Daily     gabapentin  600 mg Oral TID     heparin  5,000 Units Subcutaneous Q8H     lamoTRIgine  100 mg Oral BID     milnacipran  50 mg Oral BID     pravastatin  10 mg Oral QPM     senna-docusate  1 tablet Per NG tube BID    Or     senna-docusate  2 tablet Oral BID     sodium chloride (PF)  10 mL Intracatheter Q8H     thiamine  200 mg Intravenous BID     vancomycin (VANCOCIN) IV  2,000 mg Intravenous Q12H       Data   Recent  Labs   Lab 09/20/19  0610 09/19/19  0635 09/18/19  0645  09/16/19  1345   WBC 12.1* 8.3 7.4   < > 6.9   HGB 9.8* 9.8* 9.7*   < > 9.2*   MCV 87 87 87   < > 88    196 185   < > 195    142 137   < > 140   POTASSIUM 3.2* 3.8 4.1   < > 4.1   CHLORIDE 106 107 103   < > 107   CO2 30 31 31   < > 17*   BUN 18 18 21   < > 8   CR 0.87 0.92 1.09   < > 0.73   ANIONGAP 5 4 3   < > 16*   QUE 8.0* 8.2* 7.8*   < > 7.5*   * 106* 107*   < > 67*   ALBUMIN 2.3* 2.3* 2.3*   < > 1.4*   PROTTOTAL 5.6* 5.9* 5.4*   < > 3.0*   BILITOTAL 0.4 0.4 0.6   < > 0.5   ALKPHOS 115 113 76   < > 50   ALT 61 52 29   < > 14   AST 35 48* 36   < > 20   TROPI  --   --   --   --  0.022    < > = values in this interval not displayed.       Recent Results (from the past 24 hour(s))   XR Chest Port 1 View    Narrative    CHEST PORTABLE ONE VIEW  9/20/2019 8:10 AM     COMPARISON: Frontal chest x-ray 9/17/2019    HISTORY: Followup pneumonia.      Impression    IMPRESSION: Right PICC line has been withdrawn slightly and the tip is  now in the upper superior vena cava. Endotracheal tube has been  removed.    There has been interval worsening of patchy airspace opacity  throughout both lungs worrisome for pneumonia. There is no pleural  effusion or pneumothorax. Heart size remains within normal limits.

## 2019-09-20 NOTE — PROGRESS NOTES
09/20/19 0900   Quick Adds   Type of Visit Initial Occupational Therapy Evaluation   Living Environment   Lives With child(andrew), adult;child(andrew), dependent;spouse  (13 year -old son + 19 year -old stepson)   Living Arrangements house   Home Accessibility stairs to enter home   Number of Stairs, Main Entrance 3   Stair Railings, Main Entrance railings on both sides of stairs   Transportation Anticipated family or friend will provide  (drives at baseline)   Living Environment Comment Pt. reports he has a walk-in shower, plans on getting  grab bars, shower chair;has handicapped toilet, plans on getting grab bar by toilet;   Self-Care   Usual Activity Tolerance good   Current Activity Tolerance moderate   Equipment Currently Used at Home crutches  (pt.getting grab bars for shower,shower chair,WC,knee scooter)   Activity/Exercise/Self-Care Comment Pt. typically indep. w/ I/ADL's, works(desk job/sitting), drives.  (workers comp. case)   Functional Level   Ambulation 0-->independent   Transferring 0-->independent   Toileting 0-->independent   Bathing 0-->independent   Dressing 0-->independent   Eating 0-->independent   Communication 0-->understands/communicates without difficulty   Swallowing 0-->swallows foods/liquids without difficulty   Cognition 0 - no cognition issues reported   Fall history within last six months no   Which of the above functional risks had a recent onset or change? ambulation;transferring;toileting;bathing;dressing   General Information   Onset of Illness/Injury or Date of Surgery - Date 09/12/19   Referring Physician    Patient/Family Goals Statement DC home   Additional Occupational Profile Info/Pertinent History of Current Problem OT:Patient admitted on 9/12/19, POD #8  L BKA due to L chronic ankle and hindfoot pain, CRPS s/p prior subtalar bone block arthrodesis. Pt. transferred to ICU and subsequently intubated on 9/16/19 for septic shock. Patient extubated on 9/17/19.     Precautions/Limitations fall precautions   Weight-Bearing Status - LLE nonweight-bearing   General Observations Pt. fatigued, SOB, currently on 4L O2, nursing in room giving meds.   General Info Comments Pt. will have assist from family at home.    Cognitive Status Examination   Orientation orientation to person, place and time   Level of Consciousness alert   Follows Commands (Cognition) WNL   Cognitive Comment intact per observation/convseration   Visual Perception   Visual Perception Comments no visin problems noted/reported   Sensory Examination   Sensory Comments numbness--LLE   Pain Assessment   Patient Currently in Pain Yes, see Vital Sign flowsheet  (8/10)   Range of Motion (ROM)   ROM Comment BUE WNL   Strength   Strength Comments BUE WFL/WNL;gen.weakness;decreased activity tolerance   Hand Strength   Hand Strength Comments WNL   Coordination   Coordination Comments WNL   Mobility   Bed Mobility Comments indep. (per chart)   Transfer Skills   Transfer Comments declined OOB activity at this time   Toilet Transfer   Toilet Transfer Comments has higher/handicap toilet;would like to get grab bar by toilet   Tub/Shower Transfer   Tub/Shower Transfer Comments has walk-in shower w/approx. 4 inch threshold   Balance   Balance Comments impaired--currentlyusing crutches;per PT, SBA for mobility   Toileting   Level of [place]: Toilet independent  (using urinal/EOB)   Instrumental Activities of Daily Living (IADL)   Previous Responsibilities driving;work  (family will assist w/IADL's)   Activities of Daily Living Analysis   Impairments Contributing to Impaired Activities of Daily Living balance impaired;pain;strength decreased  (decreased activity tolerance)   General Therapy Interventions   Planned Therapy Interventions ADL retraining;strengthening   Clinical Impression   Criteria for Skilled Therapeutic Interventions Met yes, treatment indicated   OT Diagnosis Decline in ADL performance   Influenced by the  "following impairments weakness, decreased activity tolerance(currently SOB at rest), impaired balance, pain   Assessment of Occupational Performance 3-5 Performance Deficits   Identified Performance Deficits Currently below baseline w/ dressing,toileting, bathing, fx. transfers/mobility + IADL's   Clinical Decision Making (Complexity) Low complexity   Therapy Frequency Daily   Predicted Duration of Therapy Intervention (days/wks) 3-5 days   Anticipated Equipment Needs at Discharge bathing equipment;bath sponge;dressing equipment;reacher;shower chair;wheelchair;other (see comments)  (grab bars;will also get knee scooter for future use)   Anticipated Discharge Disposition Home;Home with Assist;anticipate home w/ assist from family w/ ADL's/bathing, pending fx.transfers/ADL assessment.   Risks and Benefits of Treatment have been explained. Yes   Patient, Family & other staff in agreement with plan of care Yes   [facility] TM \"6 Clicks\"   2016, Trustees of [facility], under license to [employer].  All rights reserved.   6 Clicks Short Forms Daily Activity Inpatient Short Form   [facility]  \"6 Clicks\" Daily Activity Inpatient Short Form   1. Putting on and taking off regular lower body clothing? 3 - A Little   2. Bathing (including washing, rinsing, drying)? 3 - A Little   3. Toileting, which includes using toilet, bedpan or urinal? 3 - A Little   4. Putting on and taking off regular upper body clothing? 4 - None   5. Taking care of personal grooming such as brushing teeth? 3 - A Little  (standing)   6. Eating meals? 4 - None   Daily Activity Raw Score (Score out of 24.Lower scores equate to lower levels of function) 20   Total Evaluation Time   Total Evaluation Time (Minutes) 9     "
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
## 2019-09-20 NOTE — PHARMACY-VANCOMYCIN DOSING SERVICE
Pharmacy Vancomycin Initial Note  Date of Service 2019  Patient's  1970  48 year old, male    Indication: [facility] Pneumonia    Current estimated CrCl = Estimated Creatinine Clearance: 149.8 mL/min (based on SCr of 0.87 mg/dL).    Creatinine for last 3 days  2019:  6:45 AM Creatinine 1.09 mg/dL  2019:  6:35 AM Creatinine 0.92 mg/dL  2019:  6:10 AM Creatinine 0.87 mg/dL    Recent Vancomycin Level(s) for last 3 days  2019:  3:40 PM Vancomycin Level 16.0 mg/L  2019:  6:35 AM Vancomycin Level 16.0 mg/L      Vancomycin IV Administrations (past 72 hours)                   vancomycin (VANCOCIN) 2,000 mg in sodium chloride 0.9 % 500 mL intermittent infusion (mg) 2,000 mg New Bag 19 0822     2,000 mg New Bag 19 2028     2,000 mg New Bag  0829     2,000 mg New Bag 19 1955                Nephrotoxins and other renal medications (From now, onward)    Start     Dose/Rate Route Frequency Ordered Stop    19 1030  vancomycin (VANCOCIN) 2,000 mg in sodium chloride 0.9 % 500 mL intermittent infusion      2,000 mg  over 2 Hours Intravenous EVERY 12 HOURS 19 1013      19 0945  furosemide (LASIX) injection 40 mg      40 mg  over 1-2 Minutes Intravenous ONCE 19 0936            Contrast Orders - past 72 hours (72h ago, onward)    None                Plan:  1.  Start vancomycin  2000 mg IV q12h. (previosus dose which provided therapeutic level)  2.  Goal Trough Level: 15-20 mg/L   3.  Pharmacy will check trough levels as appropriate in 1-3 Days.    4. Serum creatinine levels will be ordered daily for the first week of therapy and at least twice weekly for subsequent weeks.    5. [place] method utilized to dose vancomycin therapy: Method 2    Arin Reed [facility]

## 2019-09-20 NOTE — PROGRESS NOTES
When rounding on picc line it was noted to be pulled out 6cm more than when placed.  CXR shows tip now in the upper SVC.  Discussed with Dr Guzman and orders given to remove picc line due to malposition and due picc being placed in ICU while septic.  Peripheral IV access established and line removed without difficulty.

## 2019-09-20 NOTE — PLAN OF CARE
Vs stable, pt requiring 2L of O2 through the night, afebrile. A/Ox 4. Incisions CDI. Pain controlled with oral dilaudid, flexeril and tylenol. Up with stand by assist, NWB LLE, pt using crutches. Reg diet. Flatus +. Voiding +. LS clear to diminished, pt continues to c/o dyspnea. IS and acapella encouraged and at bedside. Pt planning to discharge today.PICC still in place.

## 2019-09-20 NOTE — PLAN OF CARE
A/O. LS diminished throughout. BS active. CMS intact. Dressing CDI. Complained of SOB. Satting mid 90's on 4 L. Tacy at 117. MD was notified and placed on bi-pap. Complained of pain 8/10 treated with 0.5 IV dilaudid.

## 2019-09-20 NOTE — PLAN OF CARE
A&O. Decreased resp status. RR elevated, on 4L o2 N[place], humidification applied.  LS with fine crackles in bases, and diminished. Productive cough. Pulmonary hygiene promoted.  Bs+, flatus+. IV lasix given, voiding adequately with urinal at bedside.  Stump with brace in place, CDI.  Potassium replaced today, recheck at 1830.  IV meds rescheduled appropriately after loss of PICC, pharmacy consulted.

## 2019-09-20 NOTE — PLAN OF CARE
A&Ox4. VSS on RA, hypertensive and tachycardic at times. LS clear, dyspnea on exertion. BS present, passing flatus. Tolerating regular diet. Up with one, GB/crutches x 2. Pain managed w/ PRN PO Dilaudid, PRN Tylenol, and prn flexeril. Incision CDI.

## 2019-09-20 NOTE — PLAN OF CARE
Discharge Planner OT     OT:(Late Entry):Evaluation completed and treatment initiated(limited). Pt. admitted on 9/12/19, POD #8  L BKA due to L chronic ankle and hindfoot pain, CRPS s/p prior subtalar bone block arthrodesis. Pt. transferred to ICU and subsequently intubated on 9/16/19 for septic shock. Patient extubated on 9/17/19.     Pt. resides w/spouse and 2 teenage children. Pt. selenacailly indep. W/ I/ADL's. Pr. Has a walk-in shower + higher toilet. Pt. Plans to get grab bars in shower and by toilet(relative will install) + shower chair.  Patient plan for discharge: Home w/family assist  Current status: Pt. declined OOB activity this morning;pt. SOB w/ conversation(on 4L O2), sats =93%, HR 76. Ed. in DME/AE for ADL's/home; Pt. already has ordered(via care coord.): 3 grab bars--wants a fourth for toilet--care coord. notified,shower chair, WC + knee scooter. Issued DME/AE resource handouts; will plan to trial simulated shower transfer when pt. willing/able to get OOB;Pt.  Indep./supervision w/ supine-sit to use urinal EOB, declined any further activity due to fatigue and pain.   Barriers to return to prior living situation: Current level of assist, weakness, decreased activity tolerance  Recommendations for discharge: Anticipate home w/ family pending OOB/ADL assessment.  Rationale for recommendations: Pt. mobilizing w/PT-- SBA yesterday, will have family support + DME at home for safety.        Entered by: Snow Almanzar 09/20/2019 12:09 PM

## 2019-09-20 NOTE — PROGRESS NOTES
09/20/19 0900   Quick Adds   Type of Visit Initial Occupational Therapy Evaluation   Living Environment   Lives With child(andrew), adult;child(andrew), dependent;spouse  (13 year -old son + 19 year -old stepson)   Living Arrangements house   Home Accessibility stairs to enter home   Number of Stairs, Main Entrance 3   Stair Railings, Main Entrance railings on both sides of stairs   Transportation Anticipated family or friend will provide  (drives at baseline)   Living Environment Comment Pt. reports he has a walk-in shower, plans on getting  grab bars, shower chair;has handicapped toilet, plans on getting grab bar by toilet;   Self-Care   Usual Activity Tolerance good   Current Activity Tolerance moderate   Equipment Currently Used at Home crutches  (pt.getting grab bars for shower,shower chair,WC,knee scooter)   Activity/Exercise/Self-Care Comment Pt. typically indep. w/ I/ADL's, works(desk job/sitting), drives.  (workers comp. case)   Functional Level   Ambulation 0-->independent   Transferring 0-->independent   Toileting 0-->independent   Bathing 0-->independent   Dressing 0-->independent   Eating 0-->independent   Communication 0-->understands/communicates without difficulty   Swallowing 0-->swallows foods/liquids without difficulty   Cognition 0 - no cognition issues reported   Fall history within last six months no   Which of the above functional risks had a recent onset or change? ambulation;transferring;toileting;bathing;dressing   General Information   Onset of Illness/Injury or Date of Surgery - Date 09/12/19   Referring Physician    Patient/Family Goals Statement DC home   Additional Occupational Profile Info/Pertinent History of Current Problem OT:Patient admitted on 9/12/19, POD #8  L BKA due to L chronic ankle and hindfoot pain, CRPS s/p prior subtalar bone block arthrodesis. Pt. transferred to ICU and subsequently intubated on 9/16/19 for septic shock. Patient extubated on 9/17/19.     Precautions/Limitations fall precautions   Weight-Bearing Status - LLE nonweight-bearing   General Observations Pt. fatigued, SOB, currently on 4L O2, nursing in room giving meds.   General Info Comments Pt. will have assist from family at home.    Cognitive Status Examination   Orientation orientation to person, place and time   Level of Consciousness alert   Follows Commands (Cognition) WNL   Cognitive Comment intact per observation/convseration   Visual Perception   Visual Perception Comments no visin problems noted/reported   Sensory Examination   Sensory Comments numbness--LLE   Pain Assessment   Patient Currently in Pain Yes, see Vital Sign flowsheet  (8/10)   Range of Motion (ROM)   ROM Comment BUE WNL   Strength   Strength Comments BUE WFL/WNL;gen.weakness;decreased activity tolerance   Hand Strength   Hand Strength Comments WNL   Coordination   Coordination Comments WNL   Mobility   Bed Mobility Comments indep. (per chart)   Transfer Skills   Transfer Comments declined OOB activity at this time   Toilet Transfer   Toilet Transfer Comments has higher/handicap toilet;would like to get grab bar by toilet   Tub/Shower Transfer   Tub/Shower Transfer Comments has walk-in shower w/approx. 4 inch threshold   Balance   Balance Comments impaired--currentlyusing crutches;per PT, SBA for mobility   Toileting   Level of [place]: Toilet independent  (using urinal/EOB)   Instrumental Activities of Daily Living (IADL)   Previous Responsibilities driving;work  (family will assist w/IADL's)   Activities of Daily Living Analysis   Impairments Contributing to Impaired Activities of Daily Living balance impaired;pain;strength decreased  (decreased activity tolerance)   General Therapy Interventions   Planned Therapy Interventions ADL retraining;strengthening   Clinical Impression   Criteria for Skilled Therapeutic Interventions Met yes, treatment indicated   OT Diagnosis Decline in ADL performance   Influenced by the  "following impairments weakness, decreased activity tolerance(currently SOB at rest), impaired balance, pain   Assessment of Occupational Performance 3-5 Performance Deficits   Identified Performance Deficits Currently below baseline w/ dressing,toileting, bathing, fx. transfers/mobility + IADL's   Clinical Decision Making (Complexity) Low complexity   Therapy Frequency Daily   Predicted Duration of Therapy Intervention (days/wks) 3-5 days   Anticipated Equipment Needs at Discharge bathing equipment;bath sponge;dressing equipment;reacher;shower chair;wheelchair;other (see comments)  (grab bars;will also get knee scooter for future use)   Anticipated Discharge Disposition Home;Home with Assist   Risks and Benefits of Treatment have been explained. Yes   Patient, Family & other staff in agreement with plan of care Yes   [facility] TM \"6 Clicks\"   2016, Trustees of [facility], under license to [employer]".  All rights reserved.   6 Clicks Short Forms Daily Activity Inpatient Short Form   [facility]  \"6 Clicks\" Daily Activity Inpatient Short Form   1. Putting on and taking off regular lower body clothing? 3 - A Little   2. Bathing (including washing, rinsing, drying)? 3 - A Little   3. Toileting, which includes using toilet, bedpan or urinal? 3 - A Little   4. Putting on and taking off regular upper body clothing? 4 - None   5. Taking care of personal grooming such as brushing teeth? 3 - A Little  (standing)   6. Eating meals? 4 - None   Daily Activity Raw Score (Score out of 24.Lower scores equate to lower levels of function) 20   Total Evaluation Time   Total Evaluation Time (Minutes) 9     "

## 2019-09-20 NOTE — PLAN OF CARE
[id number]: Patient is A/O x4. NWB to L BKA. POD 8. Dressing changed. Incision WDL. Patient was started on Bipap around 1700. Per RT from labs patient does not require Bipap. Switched to 5L NC and patient at 90-93%. Tele NSR. Continue to monitor.

## 2019-09-20 NOTE — PROGRESS NOTES
I called and updated [facility] and left message with Merna, [phone] regarding patient request for a 4th grab bar at home. Await return call if any additional documentation needed, left name and number of unit SW for the weekend.

## 2019-09-21 ENCOUNTER — APPOINTMENT (OUTPATIENT)
Dept: PHYSICAL THERAPY | Facility: CLINIC | Age: 49
DRG: 040 | End: 2019-09-21
Attending: ORTHOPAEDIC SURGERY
Payer: OTHER MISCELLANEOUS

## 2019-09-21 LAB
ALBUMIN SERPL-MCNC: 2.5 G/DL (ref 3.4–5)
ALP SERPL-CCNC: 142 U/L (ref 40–150)
ALT SERPL W P-5'-P-CCNC: 64 U/L (ref 0–70)
ANION GAP SERPL CALCULATED.3IONS-SCNC: 3 MMOL/L (ref 3–14)
AST SERPL W P-5'-P-CCNC: 25 U/L (ref 0–45)
BILIRUB SERPL-MCNC: 0.3 MG/DL (ref 0.2–1.3)
BUN SERPL-MCNC: 13 MG/DL (ref 7–30)
CALCIUM SERPL-MCNC: 8.3 MG/DL (ref 8.5–10.1)
CHLORIDE SERPL-SCNC: 107 MMOL/L (ref 94–109)
CO2 SERPL-SCNC: 30 MMOL/L (ref 20–32)
CREAT SERPL-MCNC: 0.84 MG/DL (ref 0.66–1.25)
ERYTHROCYTE [DISTWIDTH] IN BLOOD BY AUTOMATED COUNT: 13.3 % (ref 10–15)
GFR SERPL CREATININE-BSD FRML MDRD: >90 ML/MIN/{1.73_M2}
GLUCOSE SERPL-MCNC: 179 MG/DL (ref 70–99)
HCT VFR BLD AUTO: 33.3 % (ref 40–53)
HGB BLD-MCNC: 11 G/DL (ref 13.3–17.7)
LACTATE BLD-SCNC: 1.8 MMOL/L (ref 0.7–2)
MAGNESIUM SERPL-MCNC: 2.4 MG/DL (ref 1.6–2.3)
MCH RBC QN AUTO: 28.6 PG (ref 26.5–33)
MCHC RBC AUTO-ENTMCNC: 33 G/DL (ref 31.5–36.5)
MCV RBC AUTO: 87 FL (ref 78–100)
PHOSPHATE SERPL-MCNC: 3.9 MG/DL (ref 2.5–4.5)
PLATELET # BLD AUTO: 239 10E9/L (ref 150–450)
POTASSIUM SERPL-SCNC: 4.2 MMOL/L (ref 3.4–5.3)
PROT SERPL-MCNC: 6.5 G/DL (ref 6.8–8.8)
RBC # BLD AUTO: 3.85 10E12/L (ref 4.4–5.9)
SODIUM SERPL-SCNC: 140 MMOL/L (ref 133–144)
WBC # BLD AUTO: 11.4 10E9/L (ref 4–11)

## 2019-09-21 PROCEDURE — 99233 SBSQ HOSP IP/OBS HIGH 50: CPT | Performed by: INTERNAL MEDICINE

## 2019-09-21 PROCEDURE — 83735 ASSAY OF MAGNESIUM: CPT | Performed by: HOSPITALIST

## 2019-09-21 PROCEDURE — 12000000 ZZH R&B MED SURG/OB

## 2019-09-21 PROCEDURE — 40000275 ZZH STATISTIC RCP TIME EA 10 MIN

## 2019-09-21 PROCEDURE — 25800030 ZZH RX IP 258 OP 636: Performed by: ORTHOPAEDIC SURGERY

## 2019-09-21 PROCEDURE — 25000132 ZZH RX MED GY IP 250 OP 250 PS 637: Performed by: ORTHOPAEDIC SURGERY

## 2019-09-21 PROCEDURE — 25000128 H RX IP 250 OP 636: Performed by: HOSPITALIST

## 2019-09-21 PROCEDURE — 84100 ASSAY OF PHOSPHORUS: CPT | Performed by: HOSPITALIST

## 2019-09-21 PROCEDURE — 97530 THERAPEUTIC ACTIVITIES: CPT | Mod: GP

## 2019-09-21 PROCEDURE — 36415 COLL VENOUS BLD VENIPUNCTURE: CPT | Performed by: INTERNAL MEDICINE

## 2019-09-21 PROCEDURE — 25000128 H RX IP 250 OP 636: Performed by: INTERNAL MEDICINE

## 2019-09-21 PROCEDURE — 94640 AIRWAY INHALATION TREATMENT: CPT | Mod: 76

## 2019-09-21 PROCEDURE — 94640 AIRWAY INHALATION TREATMENT: CPT

## 2019-09-21 PROCEDURE — 83605 ASSAY OF LACTIC ACID: CPT | Performed by: INTERNAL MEDICINE

## 2019-09-21 PROCEDURE — 12000047 ZZH R&B IMCU

## 2019-09-21 PROCEDURE — 85027 COMPLETE CBC AUTOMATED: CPT | Performed by: HOSPITALIST

## 2019-09-21 PROCEDURE — 80053 COMPREHEN METABOLIC PANEL: CPT | Performed by: HOSPITALIST

## 2019-09-21 PROCEDURE — 36415 COLL VENOUS BLD VENIPUNCTURE: CPT | Performed by: HOSPITALIST

## 2019-09-21 PROCEDURE — 25000125 ZZHC RX 250: Performed by: INTERNAL MEDICINE

## 2019-09-21 PROCEDURE — 25000128 H RX IP 250 OP 636: Performed by: ORTHOPAEDIC SURGERY

## 2019-09-21 PROCEDURE — 25000132 ZZH RX MED GY IP 250 OP 250 PS 637: Performed by: HOSPITALIST

## 2019-09-21 RX ORDER — FUROSEMIDE 10 MG/ML
20 INJECTION INTRAMUSCULAR; INTRAVENOUS ONCE
Status: COMPLETED | OUTPATIENT
Start: 2019-09-21 | End: 2019-09-21

## 2019-09-21 RX ADMIN — GABAPENTIN 600 MG: 300 CAPSULE ORAL at 09:41

## 2019-09-21 RX ADMIN — HYDROMORPHONE HYDROCHLORIDE 4 MG: 2 TABLET ORAL at 09:41

## 2019-09-21 RX ADMIN — GABAPENTIN 600 MG: 300 CAPSULE ORAL at 17:11

## 2019-09-21 RX ADMIN — ESCITALOPRAM 10 MG: 5 TABLET, FILM COATED ORAL at 09:40

## 2019-09-21 RX ADMIN — BUPROPION HYDROCHLORIDE 75 MG: 75 TABLET, FILM COATED ORAL at 09:41

## 2019-09-21 RX ADMIN — LAMOTRIGINE 100 MG: 100 TABLET ORAL at 09:40

## 2019-09-21 RX ADMIN — HYDROMORPHONE HYDROCHLORIDE 4 MG: 2 TABLET ORAL at 15:58

## 2019-09-21 RX ADMIN — BUPROPION HYDROCHLORIDE 75 MG: 75 TABLET, FILM COATED ORAL at 22:18

## 2019-09-21 RX ADMIN — GABAPENTIN 600 MG: 300 CAPSULE ORAL at 22:18

## 2019-09-21 RX ADMIN — ACETAMINOPHEN 650 MG: 325 TABLET ORAL at 12:02

## 2019-09-21 RX ADMIN — ACETAMINOPHEN 650 MG: 325 TABLET ORAL at 19:59

## 2019-09-21 RX ADMIN — CYCLOBENZAPRINE HYDROCHLORIDE 10 MG: 10 TABLET, FILM COATED ORAL at 23:29

## 2019-09-21 RX ADMIN — HEPARIN SODIUM 5000 UNITS: 5000 INJECTION, SOLUTION INTRAVENOUS; SUBCUTANEOUS at 05:29

## 2019-09-21 RX ADMIN — LAMOTRIGINE 100 MG: 100 TABLET ORAL at 22:18

## 2019-09-21 RX ADMIN — VANCOMYCIN HYDROCHLORIDE 2000 MG: 5 INJECTION, POWDER, LYOPHILIZED, FOR SOLUTION INTRAVENOUS at 23:29

## 2019-09-21 RX ADMIN — HYDROMORPHONE HYDROCHLORIDE 4 MG: 2 TABLET ORAL at 12:55

## 2019-09-21 RX ADMIN — FLUTICASONE FUROATE AND VILANTEROL TRIFENATATE 1 PUFF: 100; 25 POWDER RESPIRATORY (INHALATION) at 09:42

## 2019-09-21 RX ADMIN — METHYLPREDNISOLONE SODIUM SUCCINATE 40 MG: 40 INJECTION, POWDER, FOR SOLUTION INTRAMUSCULAR; INTRAVENOUS at 17:12

## 2019-09-21 RX ADMIN — ACETAMINOPHEN 650 MG: 325 TABLET ORAL at 15:58

## 2019-09-21 RX ADMIN — HYDROMORPHONE HYDROCHLORIDE 4 MG: 2 TABLET ORAL at 22:17

## 2019-09-21 RX ADMIN — ACETAMINOPHEN 650 MG: 325 TABLET ORAL at 02:50

## 2019-09-21 RX ADMIN — METHYLPREDNISOLONE SODIUM SUCCINATE 40 MG: 40 INJECTION, POWDER, FOR SOLUTION INTRAMUSCULAR; INTRAVENOUS at 00:27

## 2019-09-21 RX ADMIN — MILNACIPRAN HYDROCHLORIDE 50 MG: 50 TABLET, FILM COATED ORAL at 22:18

## 2019-09-21 RX ADMIN — CEFTRIAXONE SODIUM 2 G: 2 INJECTION, POWDER, FOR SOLUTION INTRAMUSCULAR; INTRAVENOUS at 11:58

## 2019-09-21 RX ADMIN — SENNOSIDES AND DOCUSATE SODIUM 2 TABLET: 8.6; 5 TABLET ORAL at 09:41

## 2019-09-21 RX ADMIN — IPRATROPIUM BROMIDE AND ALBUTEROL SULFATE 3 ML: .5; 3 SOLUTION RESPIRATORY (INHALATION) at 20:27

## 2019-09-21 RX ADMIN — HYDROMORPHONE HYDROCHLORIDE 4 MG: 2 TABLET ORAL at 02:49

## 2019-09-21 RX ADMIN — FUROSEMIDE 20 MG: 10 INJECTION, SOLUTION INTRAVENOUS at 11:58

## 2019-09-21 RX ADMIN — HYDROMORPHONE HYDROCHLORIDE 4 MG: 2 TABLET ORAL at 19:08

## 2019-09-21 RX ADMIN — HEPARIN SODIUM 5000 UNITS: 5000 INJECTION, SOLUTION INTRAVENOUS; SUBCUTANEOUS at 13:40

## 2019-09-21 RX ADMIN — SENNOSIDES AND DOCUSATE SODIUM 2 TABLET: 8.6; 5 TABLET ORAL at 22:17

## 2019-09-21 RX ADMIN — ACETAMINOPHEN 650 MG: 325 TABLET ORAL at 06:45

## 2019-09-21 RX ADMIN — HYDROMORPHONE HYDROCHLORIDE 4 MG: 2 TABLET ORAL at 06:45

## 2019-09-21 RX ADMIN — PRAVASTATIN SODIUM 10 MG: 10 TABLET ORAL at 19:59

## 2019-09-21 RX ADMIN — METHYLPREDNISOLONE SODIUM SUCCINATE 40 MG: 40 INJECTION, POWDER, FOR SOLUTION INTRAMUSCULAR; INTRAVENOUS at 09:41

## 2019-09-21 RX ADMIN — IPRATROPIUM BROMIDE AND ALBUTEROL SULFATE 3 ML: .5; 3 SOLUTION RESPIRATORY (INHALATION) at 10:41

## 2019-09-21 RX ADMIN — VANCOMYCIN HYDROCHLORIDE 2000 MG: 5 INJECTION, POWDER, LYOPHILIZED, FOR SOLUTION INTRAVENOUS at 13:41

## 2019-09-21 RX ADMIN — CYCLOBENZAPRINE HYDROCHLORIDE 10 MG: 10 TABLET, FILM COATED ORAL at 13:41

## 2019-09-21 RX ADMIN — HEPARIN SODIUM 5000 UNITS: 5000 INJECTION, SOLUTION INTRAVENOUS; SUBCUTANEOUS at 22:19

## 2019-09-21 RX ADMIN — CYCLOBENZAPRINE HYDROCHLORIDE 10 MG: 10 TABLET, FILM COATED ORAL at 05:29

## 2019-09-21 RX ADMIN — IPRATROPIUM BROMIDE AND ALBUTEROL SULFATE 3 ML: .5; 3 SOLUTION RESPIRATORY (INHALATION) at 15:49

## 2019-09-21 RX ADMIN — VANCOMYCIN HYDROCHLORIDE 2000 MG: 5 INJECTION, POWDER, LYOPHILIZED, FOR SOLUTION INTRAVENOUS at 00:28

## 2019-09-21 RX ADMIN — MILNACIPRAN HYDROCHLORIDE 50 MG: 50 TABLET, FILM COATED ORAL at 09:41

## 2019-09-21 ASSESSMENT — ACTIVITIES OF DAILY LIVING (ADL)
ADLS_ACUITY_SCORE: 13

## 2019-09-21 NOTE — PLAN OF CARE
OT: attempted at set time and noted PT had just attempted to work with patient less than 5 min earlier and pt sound asleep and family requesting that pt not be woken up at this time. Note states pt has not slept well during the hospital stay.

## 2019-09-21 NOTE — PROGRESS NOTES
Chart reviewed.  Still working on pulmonary issues, continuing abx for pneumonia.  Hemodynamically stable.    L leg exam has remained stable and no concern for this as source of infection.    Cont. NWB LLE and keep in FloTech at all times except daily hygiene to limit knee flexion contracture.  Increased gabapentin to 600mg PO TID two days ago with some improvement in phantom pain, will continue to monitor.  Patient has increased risk of narcotic abuse and tolerance given previous history and attempt to limit use of narcotics for pain control as able.    Still anticipate discharge home when deemed medically stable.  PT/OT going well when Aditya has the energy to work with them.  Will continue to monitor patient's progress from pulmonary standpoint.

## 2019-09-21 NOTE — PROGRESS NOTES
[facility]    Hospitalist Progress Note    Brief Summary:  Juancho Mohan is a 48 year old male with history of sarcoidosis, sleep apnea, GERD, migraine, hypertension, who presented on 9/12 for elective left below knee amputation with orthopedic surgery for chronic pain after workers comp injury many years ago. Initially was progressing well after surgery, had been transitioned to PO pain regimen and there were plans to set up home care. On 9/16 RRT was called and he was found to be mottled, cool extremities, lethargic with fever, hypotension, tachycardia, and hypoxia on supplemental O2. He denied any complaints at that time other than some mild dizziness. He also had minimal urine output noted on 9/16. He was placed on bipap, transferred to ICU for monitoring and started on sepsis protocol with vanc/zosyn, IVF resuscitation. He was taken for CT head and CT PE chest to rule out obstructive shock with PE (no findings found). He was extubated 9/17, weaned off levophed. He is now hypertensive and not requiring any supplemental O2. Blood cx found that he had gram + cocci in pairs and chains in both bottles, strep preliminarily.      Assessment & Plan   Juancho Mohan is a 48 year old male who was admitted on 9/12/2019. I was asked to see the patient for transfer of care from ICU, orthopedic surgery remains primary.     S/p Elective left below knee amputation for chronic pain on 9/12/19  Septic shock- resolved  Metabolic encephalopathy secondary to sepsis with gram positive bacteremia- now resolved  Strep bacteremia (gram +), preliminary 2 of 2 blood culture positive.    Initially did well after OR, then had RRT 9/16 requiring initiation of sepsis protocol for likely infection. CT chest showed bilateral lower lobe Pneumonia, blood cultures with gram + cocci in both bottles. He was encephalopathic initially, very unresponsive even after IVF, but has much improved -does not remember most events   however. He is off all pressors and lactate normalized today. WBC normal, Lactate max was 3.6, now 1.7. Procalcitonin was 93.56,  No trending down. He is afebrile this morning. Conscious, alert and oriented X 3.  Repeat blood culture this morning, Blood culture drawn on 9/17 are negative so far. Echo negative for vegetation.  Source is most likely Pneumonia.     - Post op wound care and pain management per ortho  - Repeat CT scan of left lower extremity for evaluation of knee done, Ortho to review.  -- Remove bernard 9/18/2019  - Continue vitamin C and thiamine per protocols initiated in ICU, continue stress dose hydrocortisone (has hx steroid use), blood pressure is good, on tapering dose.   - Continue vanc/zosyn, await sensitivities of blood cultures.  - Advance diet as tolerated  - PICC line placed on 9/16 while with gram + cocci in blood, if repeat blood culture come back positive, will  Need to remove the PICC line    Stove  IV [id number] mg today.  Cultures shows Streptococcus mitis group and streptococcal salivarius group.  Most sensitive to IV ceftriaxone.  At this time I will discontinue the vancomycin and Zosyn.  Start him on IV ceftriaxone 2 g daily, chest xray shows worsening pneumonia, I will keep him on Vancomycin for now.          Acute hypoxic respiratory failure requiring intubation:   Bilateral lower lobe  Pneumonia  Possible sarcoidosis/asthma exacerbation as well.  RRT called 9/16 for o2 sat 82-83% on 6L o2, was placed on bipap, but became unresponsive and required intubation. Transferred to ICU for ongoing cares. Extubated 9/17 AM successfully    - IV abx as above for pneumonia  Start him on aggressive pulmonary toilet with IS and flutter, encourage him to use both.   He was off oxygen on 9/19, now with worsening SOB, check her Xray chest today, shows worsening infiltrate, Put back on Vancomycin and Continue with IV ceftriaxone.   BNP increased, and Weight increase 10 kg since 9/12.  I did  give him a dose of IV Lasix 40 mg on 9/20/2019 with great response about 5 L out since then.  I will give him a 20 mg IV Lasix today as well.    Patient shortness of breath get worse after stopping the steroids, we started him on IV Solu-Medrol 40 mg 3 times daily and since then his symptoms improved.  Wondering his symptoms may be related to possible sarcoidosis or asthma exacerbation at this time.  He was started on DuoNeb nebulization, continue with Breo Ellipta.  Not much better at this time.  If he continued to get worse and not improved will consider pulmonary consult or repeating the CT scan of the chest.     GERALD now resolved  Baseline creatinine near 0.7-0.9. Worsened to 1.77 on day of RRT due to shock. Had poor urine output, now improved after IVF resuscitation, creatinine back to normal, good oral intake, stop IV fluids on 9/18/2019  --Creatinine is now back to normal., hold nephrotoxic medications including combination lisinopril-hydrochlorothiazide.     Mild Anemia  Hgb 9.2 during RRT, improved to 12 this AM after fluid resuscitation. Did have 25ml EBL in OR   - Hemoglobin is stable at this time.      Hx Sarcoidosis  Possible Asthma  Continue PTA breo, prn albuterol inhaler  Started on IV Solu-Medrol 40 mg 3 times a day and DuoNeb nebulization.     Hypertension  PTA lisinopril/hctz 20-12.5 BID, hold while with GERALD  - PRN hydralazine IV for SBP>160     Hyperlipidemia  PTA pravastatin 10mg, continue     Adjustment disorder with anxiety and depression  PTA wellbutrin, lamictal and lexapro, both continued while inpatient     Malignant melanoma of right thigh  Maintained on keytruda as outpatient     Hx migraines   Not currently with migraine, uses PRN imitrex at home.     Constipation  Has not had a BM all admission. Has senna-docusate scheduled, added PRN miralax BID  Give one dose of Jo lax today and continue with Senakot      DVT Prophylaxis: Heparin SQ  Code Status: Full Code     Disposition: Expected  discharge in per orthopedic surgery service    Shortness of breath and symptoms get worse after stopping the IV hydrocortisone, I started him on IV Solu-Medrol 40 mg 3 times a day since then his breathing improved.  Possible asthma/sarcoidosis exacerbation.  Started on DuoNeb nebulization 4 times a day  BiPAP as needed and may be used at night because of his sleep apnea  Consider pulmonary consult and/or repeating a CT scan of the chest if symptoms does not improve.  Overall his symptoms are better as compared to yesterday.  Repeat dose of IV Lasix 20 mg times once we will try to keep him on the drier side.      Timi Land MD  Text Page  (7am - 6pm)    Interval History   Patient more SOB this morning, and hypoxic again need oxygen overnight and this morning, feeling weak, tired and some SOB. No fever, chills, chest pain, nausea or vomiting, pain is some what better.     Overnight event reviewed with the nursing staff taking care of the patient.     -Data reviewed today: I reviewed all new labs and imaging results over the last 24 hours. I personally reviewed no images or EKG's today.    Physical Exam   Temp: 97.6  F (36.4  C) Temp src: Oral BP: 137/79 Pulse: 79 Heart Rate: 75 Resp: (!) 35 SpO2: 100 % O2 Device: Oxymask Oxygen Delivery: 5 LPM  Vitals:    09/17/19 0515 09/18/19 0600 09/19/19 0612   Weight: 129.7 kg (285 lb 15 oz) 132 kg (291 lb 0.1 oz) 133.2 kg (293 lb 10.4 oz)     Vital Signs with Ranges  Temp:  [97.5  F (36.4  C)-99.3  F (37.4  C)] 97.6  F (36.4  C)  Pulse:  [] 79  Heart Rate:  [] 75  Resp:  [16-40] 35  BP: (118-152)/(67-95) 137/79  SpO2:  [74 %-100 %] 100 %  I/O last 3 completed shifts:  In: 353 [P.O.:350; I.V.:3]  Out: 5540 [Urine:5540]    Constitutional: awake, alert, cooperative, no apparent distress, and appears stated age  Eyes: Lids and lashes normal, pupils equal, round and reactive to light, extra ocular muscles intact, sclera clear, conjunctiva normal  Respiratory:  Crackles  postive at the bases bilaterally.  Cardiovascular: Normal apical impulse, regular rate and rhythm, normal S1 and S2, no S3 or S4, and no murmur noted  GI: No scars, normal bowel sounds, soft, non-distended, non-tender, no masses palpated, no hepatosplenomegally  Musculoskeletal: no lower extremity pitting edema present, Left BKA  Neurologic: no focal deficit.     Medications     - MEDICATION INSTRUCTIONS -       - MEDICATION INSTRUCTIONS -         buPROPion  75 mg Oral BID     cefTRIAXone  2 g Intravenous Q24H     escitalopram  10 mg Oral QAM     fluticasone-vilanterol  1 puff Inhalation Daily     gabapentin  600 mg Oral TID     heparin  5,000 Units Subcutaneous Q8H     ipratropium - albuterol 0.5 mg/2.5 mg/3 mL  3 mL Nebulization 4x daily     lamoTRIgine  100 mg Oral BID     methylPREDNISolone  40 mg Intravenous Q8H     milnacipran  50 mg Oral BID     pravastatin  10 mg Oral QPM     senna-docusate  1 tablet Per NG tube BID    Or     senna-docusate  2 tablet Oral BID     sodium chloride (PF)  3 mL Intracatheter Q8H     vancomycin (VANCOCIN) IV  2,000 mg Intravenous Q12H       Data   Recent Labs   Lab 09/21/19  0644 09/20/19  1835 09/20/19  1632 09/20/19  0610 09/19/19  0635  09/16/19  1345   WBC 11.4*  --   --  12.1* 8.3   < > 6.9   HGB 11.0*  --   --  9.8* 9.8*   < > 9.2*   MCV 87  --   --  87 87   < > 88     --   --  235 196   < > 195     --  142 141 142   < > 140   POTASSIUM 4.2 3.1* 3.1* 3.2* 3.8   < > 4.1   CHLORIDE 107  --  107 106 107   < > 107   CO2 30  --  28 30 31   < > 17*   BUN 13  --  15 18 18   < > 8   CR 0.84  --  0.85 0.87 0.92   < > 0.73   ANIONGAP 3  --  7 5 4   < > 16*   UQE 8.3*  --  8.4* 8.0* 8.2*   < > 7.5*   *  --  155* 128* 106*   < > 67*   ALBUMIN 2.5*  --   --  2.3* 2.3*   < > 1.4*   PROTTOTAL 6.5*  --   --  5.6* 5.9*   < > 3.0*   BILITOTAL 0.3  --   --  0.4 0.4   < > 0.5   ALKPHOS 142  --   --  115 113   < > 50   ALT 64  --   --  61 52   < > 14   AST 25  --   --  35 48*    < > 20   TROPI  --   --   --   --   --   --  0.022    < > = values in this interval not displayed.       Recent Results (from the past 24 hour(s))   XR Chest Port 1 View    Narrative    CHEST ONE VIEW PORTABLE   9/20/2019 4:40 PM     HISTORY: Shortness of breath.    COMPARISON: 9/20/2019 at 8:05 AM      Impression    IMPRESSION: Persistent bilateral interstitial and groundglass  opacities suggestive of CHF and/or ARDS. No pneumothorax or pleural  effusion. No lobar consolidation. Heart size similar to prior.  Right-sided PICC line has been removed.    JACQUIE NDIAYE MD

## 2019-09-21 NOTE — PLAN OF CARE
A&Ox4. VSS, weaned to 3L oxymask, de-sats with exertion. Pain managed with oxycodone, tylenol, flexeril, and scheduled gabapentin. Up with Ax1 and crutches. Brace in place. Dressing changed. Voiding.

## 2019-09-21 NOTE — PLAN OF CARE
A&Ox4.  AVSS,O2 @ 5L/Oxymask.  IMC, SR with BBB. Dilaudid IV/PO, Tylenol and Flexeril for pain.  LS diminished, breathing improved overnight.  BS active.  L stump with dressing CDI, protector in place.  Dangled at bedside to use urinal.  Slept in between cares.

## 2019-09-21 NOTE — PLAN OF CARE
Discharge Planner PT   Patient plan for discharge: home   Current status:     Pt supine upon arrival. RT saw prior to therapy session. pt on tele. Vitals monitored closely during session. On RA sats > 90% majority of session. Drop to low 80's with activity on RA.Resat with PLB and standing or seated rest.     Pt is IND with bed mobility. STS with SBA and crutches. Pt ambulated short distance ~ 50' with crutches, , O2 dropped to 80% on RA, standing rest break focus on PLB, improved O2 sats > 90%. Returned to supine due to inc HR, inc SOB, inc RR. Pt left in long sitting end of session with 5L via oxymask donned, needs in reach and family present     Barriers to return to prior living situation: Medical stability, impaired activity tolerance  Recommendations for discharge: Home with A for stair navigation  Rationale for recommendations: Pt mobilizing well CGA-SBA with crutches. Limited by fatigue, SOB. Anticipate with further medical management pt will progress to meet goals prior to discharge. At this time would predict will need CGA to navigate stairs however may progress to SBA or mod I for this          Entered by: Kathryn Roberts 09/21/2019 11:09 AM

## 2019-09-21 NOTE — PLAN OF CARE
PT: Attempted this AM. Pt sleeping soundly. Family in room requests pt is not woken up this AM as he has not been able to sleep this hospital stay and had a tough day yesterday. Discussed with family having pt elevate residual limb when he wakes up as it is currently dangling OOB.

## 2019-09-22 ENCOUNTER — APPOINTMENT (OUTPATIENT)
Dept: GENERAL RADIOLOGY | Facility: CLINIC | Age: 49
DRG: 040 | End: 2019-09-22
Attending: INTERNAL MEDICINE
Payer: OTHER MISCELLANEOUS

## 2019-09-22 LAB
ALBUMIN SERPL-MCNC: 2.5 G/DL (ref 3.4–5)
ALP SERPL-CCNC: 122 U/L (ref 40–150)
ALT SERPL W P-5'-P-CCNC: 53 U/L (ref 0–70)
ANION GAP SERPL CALCULATED.3IONS-SCNC: 5 MMOL/L (ref 3–14)
AST SERPL W P-5'-P-CCNC: 18 U/L (ref 0–45)
BASOPHILS # BLD AUTO: 0 10E9/L (ref 0–0.2)
BASOPHILS NFR BLD AUTO: 0.1 %
BILIRUB SERPL-MCNC: 0.3 MG/DL (ref 0.2–1.3)
BUN SERPL-MCNC: 18 MG/DL (ref 7–30)
CALCIUM SERPL-MCNC: 8.5 MG/DL (ref 8.5–10.1)
CHLORIDE SERPL-SCNC: 106 MMOL/L (ref 94–109)
CO2 SERPL-SCNC: 28 MMOL/L (ref 20–32)
CREAT SERPL-MCNC: 0.75 MG/DL (ref 0.66–1.25)
DIFFERENTIAL METHOD BLD: ABNORMAL
EOSINOPHIL # BLD AUTO: 0 10E9/L (ref 0–0.7)
EOSINOPHIL NFR BLD AUTO: 0 %
ERYTHROCYTE [DISTWIDTH] IN BLOOD BY AUTOMATED COUNT: 13.2 % (ref 10–15)
GFR SERPL CREATININE-BSD FRML MDRD: >90 ML/MIN/{1.73_M2}
GLUCOSE SERPL-MCNC: 153 MG/DL (ref 70–99)
HCT VFR BLD AUTO: 31.7 % (ref 40–53)
HGB BLD-MCNC: 10.6 G/DL (ref 13.3–17.7)
IMM GRANULOCYTES # BLD: 0.5 10E9/L (ref 0–0.4)
IMM GRANULOCYTES NFR BLD: 2.9 %
LACTATE BLD-SCNC: 2.7 MMOL/L (ref 0.7–2)
LACTATE BLD-SCNC: 3.3 MMOL/L (ref 0.7–2)
LYMPHOCYTES # BLD AUTO: 0.7 10E9/L (ref 0.8–5.3)
LYMPHOCYTES NFR BLD AUTO: 4.2 %
MAGNESIUM SERPL-MCNC: 2.5 MG/DL (ref 1.6–2.3)
MCH RBC QN AUTO: 28.9 PG (ref 26.5–33)
MCHC RBC AUTO-ENTMCNC: 33.4 G/DL (ref 31.5–36.5)
MCV RBC AUTO: 86 FL (ref 78–100)
MONOCYTES # BLD AUTO: 0.8 10E9/L (ref 0–1.3)
MONOCYTES NFR BLD AUTO: 4.9 %
NEUTROPHILS # BLD AUTO: 15 10E9/L (ref 1.6–8.3)
NEUTROPHILS NFR BLD AUTO: 87.9 %
NRBC # BLD AUTO: 0 10*3/UL
NRBC BLD AUTO-RTO: 0 /100
NT-PROBNP SERPL-MCNC: 2311 PG/ML (ref 0–450)
PHOSPHATE SERPL-MCNC: 4.1 MG/DL (ref 2.5–4.5)
PLATELET # BLD AUTO: 287 10E9/L (ref 150–450)
POTASSIUM SERPL-SCNC: 4.1 MMOL/L (ref 3.4–5.3)
PROT SERPL-MCNC: 6.4 G/DL (ref 6.8–8.8)
RBC # BLD AUTO: 3.67 10E12/L (ref 4.4–5.9)
SODIUM SERPL-SCNC: 139 MMOL/L (ref 133–144)
VANCOMYCIN SERPL-MCNC: 11.8 MG/L
WBC # BLD AUTO: 17.1 10E9/L (ref 4–11)

## 2019-09-22 PROCEDURE — 94640 AIRWAY INHALATION TREATMENT: CPT

## 2019-09-22 PROCEDURE — 25000132 ZZH RX MED GY IP 250 OP 250 PS 637: Performed by: ORTHOPAEDIC SURGERY

## 2019-09-22 PROCEDURE — P9047 ALBUMIN (HUMAN), 25%, 50ML: HCPCS | Performed by: INTERNAL MEDICINE

## 2019-09-22 PROCEDURE — 25000128 H RX IP 250 OP 636: Performed by: HOSPITALIST

## 2019-09-22 PROCEDURE — 83605 ASSAY OF LACTIC ACID: CPT | Performed by: INTERNAL MEDICINE

## 2019-09-22 PROCEDURE — 36415 COLL VENOUS BLD VENIPUNCTURE: CPT | Performed by: INTERNAL MEDICINE

## 2019-09-22 PROCEDURE — 99233 SBSQ HOSP IP/OBS HIGH 50: CPT | Performed by: INTERNAL MEDICINE

## 2019-09-22 PROCEDURE — 80053 COMPREHEN METABOLIC PANEL: CPT | Performed by: INTERNAL MEDICINE

## 2019-09-22 PROCEDURE — 85027 COMPLETE CBC AUTOMATED: CPT | Performed by: INTERNAL MEDICINE

## 2019-09-22 PROCEDURE — 83735 ASSAY OF MAGNESIUM: CPT | Performed by: INTERNAL MEDICINE

## 2019-09-22 PROCEDURE — 25000128 H RX IP 250 OP 636: Performed by: INTERNAL MEDICINE

## 2019-09-22 PROCEDURE — 40000275 ZZH STATISTIC RCP TIME EA 10 MIN

## 2019-09-22 PROCEDURE — 85025 COMPLETE CBC W/AUTO DIFF WBC: CPT | Performed by: INTERNAL MEDICINE

## 2019-09-22 PROCEDURE — 25000132 ZZH RX MED GY IP 250 OP 250 PS 637: Performed by: HOSPITALIST

## 2019-09-22 PROCEDURE — 36415 COLL VENOUS BLD VENIPUNCTURE: CPT | Performed by: ORTHOPAEDIC SURGERY

## 2019-09-22 PROCEDURE — 80202 ASSAY OF VANCOMYCIN: CPT | Performed by: ORTHOPAEDIC SURGERY

## 2019-09-22 PROCEDURE — 84100 ASSAY OF PHOSPHORUS: CPT | Performed by: INTERNAL MEDICINE

## 2019-09-22 PROCEDURE — 12000000 ZZH R&B MED SURG/OB

## 2019-09-22 PROCEDURE — 94640 AIRWAY INHALATION TREATMENT: CPT | Mod: 76

## 2019-09-22 PROCEDURE — 12000047 ZZH R&B IMCU

## 2019-09-22 PROCEDURE — 71045 X-RAY EXAM CHEST 1 VIEW: CPT

## 2019-09-22 PROCEDURE — 25000125 ZZHC RX 250: Performed by: INTERNAL MEDICINE

## 2019-09-22 PROCEDURE — 83880 ASSAY OF NATRIURETIC PEPTIDE: CPT | Performed by: INTERNAL MEDICINE

## 2019-09-22 RX ORDER — FUROSEMIDE 10 MG/ML
20 INJECTION INTRAMUSCULAR; INTRAVENOUS ONCE
Status: COMPLETED | OUTPATIENT
Start: 2019-09-22 | End: 2019-09-22

## 2019-09-22 RX ORDER — ALBUMIN (HUMAN) 12.5 G/50ML
25 SOLUTION INTRAVENOUS EVERY 8 HOURS
Status: COMPLETED | OUTPATIENT
Start: 2019-09-22 | End: 2019-09-22

## 2019-09-22 RX ORDER — METHYLPREDNISOLONE SODIUM SUCCINATE 40 MG/ML
40 INJECTION, POWDER, LYOPHILIZED, FOR SOLUTION INTRAMUSCULAR; INTRAVENOUS EVERY 12 HOURS
Status: DISCONTINUED | OUTPATIENT
Start: 2019-09-22 | End: 2019-09-23

## 2019-09-22 RX ADMIN — HEPARIN SODIUM 5000 UNITS: 5000 INJECTION, SOLUTION INTRAVENOUS; SUBCUTANEOUS at 22:15

## 2019-09-22 RX ADMIN — HEPARIN SODIUM 5000 UNITS: 5000 INJECTION, SOLUTION INTRAVENOUS; SUBCUTANEOUS at 13:11

## 2019-09-22 RX ADMIN — MILNACIPRAN HYDROCHLORIDE 50 MG: 50 TABLET, FILM COATED ORAL at 20:36

## 2019-09-22 RX ADMIN — IPRATROPIUM BROMIDE AND ALBUTEROL SULFATE 3 ML: .5; 3 SOLUTION RESPIRATORY (INHALATION) at 16:09

## 2019-09-22 RX ADMIN — CYCLOBENZAPRINE HYDROCHLORIDE 10 MG: 10 TABLET, FILM COATED ORAL at 06:14

## 2019-09-22 RX ADMIN — CEFTRIAXONE SODIUM 2 G: 2 INJECTION, POWDER, FOR SOLUTION INTRAMUSCULAR; INTRAVENOUS at 12:21

## 2019-09-22 RX ADMIN — HYDROMORPHONE HYDROCHLORIDE 4 MG: 2 TABLET ORAL at 22:23

## 2019-09-22 RX ADMIN — SENNOSIDES AND DOCUSATE SODIUM 2 TABLET: 8.6; 5 TABLET ORAL at 20:36

## 2019-09-22 RX ADMIN — ACETAMINOPHEN 650 MG: 325 TABLET ORAL at 22:12

## 2019-09-22 RX ADMIN — ACETAMINOPHEN 650 MG: 325 TABLET ORAL at 13:09

## 2019-09-22 RX ADMIN — ESCITALOPRAM 10 MG: 5 TABLET, FILM COATED ORAL at 09:31

## 2019-09-22 RX ADMIN — CYCLOBENZAPRINE HYDROCHLORIDE 10 MG: 10 TABLET, FILM COATED ORAL at 14:57

## 2019-09-22 RX ADMIN — CYCLOBENZAPRINE HYDROCHLORIDE 10 MG: 10 TABLET, FILM COATED ORAL at 23:45

## 2019-09-22 RX ADMIN — HEPARIN SODIUM 5000 UNITS: 5000 INJECTION, SOLUTION INTRAVENOUS; SUBCUTANEOUS at 06:15

## 2019-09-22 RX ADMIN — HYDROMORPHONE HYDROCHLORIDE 4 MG: 2 TABLET ORAL at 15:57

## 2019-09-22 RX ADMIN — ACETAMINOPHEN 650 MG: 325 TABLET ORAL at 04:30

## 2019-09-22 RX ADMIN — METHYLPREDNISOLONE SODIUM SUCCINATE 40 MG: 40 INJECTION, POWDER, FOR SOLUTION INTRAMUSCULAR; INTRAVENOUS at 09:31

## 2019-09-22 RX ADMIN — HYDROMORPHONE HYDROCHLORIDE 4 MG: 2 TABLET ORAL at 07:09

## 2019-09-22 RX ADMIN — LAMOTRIGINE 100 MG: 100 TABLET ORAL at 09:31

## 2019-09-22 RX ADMIN — SENNOSIDES AND DOCUSATE SODIUM 2 TABLET: 8.6; 5 TABLET ORAL at 09:31

## 2019-09-22 RX ADMIN — HYDROMORPHONE HYDROCHLORIDE 0.5 MG: 1 INJECTION, SOLUTION INTRAMUSCULAR; INTRAVENOUS; SUBCUTANEOUS at 17:33

## 2019-09-22 RX ADMIN — FLUTICASONE FUROATE AND VILANTEROL TRIFENATATE 1 PUFF: 100; 25 POWDER RESPIRATORY (INHALATION) at 09:32

## 2019-09-22 RX ADMIN — HYDROMORPHONE HYDROCHLORIDE 4 MG: 2 TABLET ORAL at 01:08

## 2019-09-22 RX ADMIN — HYDROMORPHONE HYDROCHLORIDE 0.5 MG: 1 INJECTION, SOLUTION INTRAMUSCULAR; INTRAVENOUS; SUBCUTANEOUS at 20:26

## 2019-09-22 RX ADMIN — IPRATROPIUM BROMIDE AND ALBUTEROL SULFATE 3 ML: .5; 3 SOLUTION RESPIRATORY (INHALATION) at 11:34

## 2019-09-22 RX ADMIN — HYDROMORPHONE HYDROCHLORIDE 4 MG: 2 TABLET ORAL at 10:24

## 2019-09-22 RX ADMIN — SODIUM CHLORIDE 500 ML: 9 INJECTION, SOLUTION INTRAVENOUS at 17:22

## 2019-09-22 RX ADMIN — HYDROMORPHONE HYDROCHLORIDE 4 MG: 2 TABLET ORAL at 04:30

## 2019-09-22 RX ADMIN — ACETAMINOPHEN 650 MG: 325 TABLET ORAL at 17:33

## 2019-09-22 RX ADMIN — ACETAMINOPHEN 650 MG: 325 TABLET ORAL at 09:31

## 2019-09-22 RX ADMIN — GABAPENTIN 600 MG: 300 CAPSULE ORAL at 09:31

## 2019-09-22 RX ADMIN — GABAPENTIN 600 MG: 300 CAPSULE ORAL at 22:12

## 2019-09-22 RX ADMIN — HYDROMORPHONE HYDROCHLORIDE 4 MG: 2 TABLET ORAL at 19:25

## 2019-09-22 RX ADMIN — BUPROPION HYDROCHLORIDE 75 MG: 75 TABLET, FILM COATED ORAL at 09:31

## 2019-09-22 RX ADMIN — GABAPENTIN 600 MG: 300 CAPSULE ORAL at 15:57

## 2019-09-22 RX ADMIN — PRAVASTATIN SODIUM 10 MG: 10 TABLET ORAL at 20:37

## 2019-09-22 RX ADMIN — IPRATROPIUM BROMIDE AND ALBUTEROL SULFATE 3 ML: .5; 3 SOLUTION RESPIRATORY (INHALATION) at 20:04

## 2019-09-22 RX ADMIN — LAMOTRIGINE 100 MG: 100 TABLET ORAL at 20:37

## 2019-09-22 RX ADMIN — MILNACIPRAN HYDROCHLORIDE 50 MG: 50 TABLET, FILM COATED ORAL at 09:31

## 2019-09-22 RX ADMIN — ACETAMINOPHEN 650 MG: 325 TABLET ORAL at 01:08

## 2019-09-22 RX ADMIN — HYDROMORPHONE HYDROCHLORIDE 4 MG: 2 TABLET ORAL at 13:09

## 2019-09-22 RX ADMIN — ALBUMIN HUMAN 25 G: 0.25 SOLUTION INTRAVENOUS at 12:22

## 2019-09-22 RX ADMIN — METHYLPREDNISOLONE SODIUM SUCCINATE 40 MG: 40 INJECTION, POWDER, FOR SOLUTION INTRAMUSCULAR; INTRAVENOUS at 22:14

## 2019-09-22 RX ADMIN — BUPROPION HYDROCHLORIDE 75 MG: 75 TABLET, FILM COATED ORAL at 20:37

## 2019-09-22 RX ADMIN — ALBUMIN HUMAN 25 G: 0.25 SOLUTION INTRAVENOUS at 19:25

## 2019-09-22 RX ADMIN — FUROSEMIDE 20 MG: 10 INJECTION, SOLUTION INTRAVENOUS at 10:24

## 2019-09-22 RX ADMIN — METHYLPREDNISOLONE SODIUM SUCCINATE 40 MG: 40 INJECTION, POWDER, FOR SOLUTION INTRAMUSCULAR; INTRAVENOUS at 01:09

## 2019-09-22 ASSESSMENT — MIFFLIN-ST. JEOR
SCORE: 2242
SCORE: 2266

## 2019-09-22 ASSESSMENT — ACTIVITIES OF DAILY LIVING (ADL)
ADLS_ACUITY_SCORE: 13

## 2019-09-22 NOTE — PLAN OF CARE
Discharge Planner OT   Patient plan for discharge: home with home OT/PT  Current status: attempted to see for OT today and pt expressed that he feels confident with dressing, toilet transfers, shower transfer and grooming at sink. He is having a lot of pain today and his HR was 115 at time of attempt.  He states he is confident with his crutches as he has been doing for years. States that he would like to discontinue OT while in the hospital and pursue Home OT when he gets home to assess his home set up.   Barriers to return to prior living situation: none anticipated  Recommendations for discharge: will complete OT orders and discharge. Recommend home with home OT and assist from wife with IADL's and ADL's as needed  Rationale for recommendations: will complete OT orders and pt will focus on mobility/stairs/endurance with PT       Entered by: Yudy Salvador 09/22/2019 3:24 PM   Occupational Therapy Discharge Summary    Reason for therapy discharge:    Patient/family request discontinuation of services.    Progress towards therapy goal(s). See goals on Care Plan in [facility] electronic health record for goal details.  Goals not met.  Barriers to achieving goals:   pt requesting to discharge from acute OT as he feels confident with dressing, toilet and shower transfer and grooming.    Therapy recommendation(s):    Continued therapy is recommended.  Rationale/Recommendations:  Home OT to assess home set up and make AE recommendations.

## 2019-09-22 NOTE — PROGRESS NOTES
[facility]    Hospitalist Progress Note    Brief Summary:  Juancho Mohan is a 48 year old male with history of sarcoidosis, sleep apnea, GERD, migraine, hypertension, who presented on 9/12 for elective left below knee amputation with orthopedic surgery for chronic pain after workers comp injury many years ago. Initially was progressing well after surgery, had been transitioned to PO pain regimen and there were plans to set up home care. On 9/16 RRT was called and he was found to be mottled, cool extremities, lethargic with fever, hypotension, tachycardia, and hypoxia on supplemental O2. He denied any complaints at that time other than some mild dizziness. He also had minimal urine output noted on 9/16. He was placed on bipap, transferred to ICU for monitoring and started on sepsis protocol with vanc/zosyn, IVF resuscitation. He was taken for CT head and CT PE chest to rule out obstructive shock with PE (no findings found). He was extubated 9/17, weaned off levophed. He is now hypertensive and not requiring any supplemental O2. Blood cx found that he had gram + cocci in pairs and chains in both bottles, strep preliminarily.      Assessment & Plan   Juancho Mohan is a 48 year old male who was admitted on 9/12/2019. I was asked to see the patient for transfer of care from ICU, orthopedic surgery remains primary.     S/p Elective left below knee amputation for chronic pain on 9/12/19  Septic shock- resolved  Metabolic encephalopathy secondary to sepsis with gram positive bacteremia- now resolved  Strep bacteremia (gram +), preliminary 2 of 2 blood culture positive.    Initially did well after OR, then had RRT 9/16 requiring initiation of sepsis protocol for likely infection. CT chest showed bilateral lower lobe Pneumonia, blood cultures with gram + cocci in both bottles. He was encephalopathic initially, very unresponsive even after IVF, but has much improved -does not remember most events   however. He is off all pressors and lactate normalized today. WBC normal, Lactate max was 3.6, now 1.7. Procalcitonin was 93.56,  No trending down. He is afebrile this morning. Conscious, alert and oriented X 3.  Repeat blood culture this morning, Blood culture drawn on 9/17 are negative so far. Echo negative for vegetation.  Source is most likely Pneumonia.     - Post op wound care and pain management per ortho  - Repeat CT scan of left lower extremity for evaluation of knee done, Ortho to review.  -- Remove bernard 9/18/2019  - Continue vitamin C and thiamine per protocols initiated in ICU, continue stress dose hydrocortisone (has hx steroid use), blood pressure is good, on tapering dose.   - Continue vanc/zosyn, await sensitivities of blood cultures.  - Advance diet as tolerated  - PICC line placed on 9/16 while with gram + cocci in blood, if repeat blood culture come back positive, will  Need to remove the PICC line    Stove  IV [id number] mg today.  Cultures shows Streptococcus mitis group and streptococcal salivarius group.  Most sensitive to IV ceftriaxone.  At this time I will discontinue the Zosyn.  Start him on IV ceftriaxone 2 g daily.  I will stop the vancomycin today as well.       Acute hypoxic respiratory failure requiring intubation:   Bilateral lower lobe  Pneumonia  Possible sarcoidosis/asthma exacerbation as well.  RRT called 9/16 for o2 sat 82-83% on 6L o2, was placed on bipap, but became unresponsive and required intubation. Transferred to ICU for ongoing cares. Extubated 9/17 AM successfully    - IV abx as above for pneumonia  Start him on aggressive pulmonary toilet with IS and flutter, encourage him to use both.   He was off oxygen on 9/19, now with worsening SOB on 9/20/2019, check her Xray chest , shows worsening infiltrate, Put back on Vancomycin and Continue with IV ceftriaxone.   BNP increased, and Weight increase 10 kg since 9/12.  I did give him a dose of IV Lasix 40 mg on  9/20/2019 with great response about 5 L out since then.  Give him intermittent IV Lasix doses, he got IV 20 mg 9/21/2019 and today as well.    Patient shortness of breath get worse after stopping the steroids, we started him on IV Solu-Medrol 40 mg 3 times daily and since then his symptoms improved.  Wondering his symptoms may be related to possible sarcoidosis or asthma exacerbation at this time.  He was started on DuoNeb nebulization, continue with Breo Ellipta.  Not much better at this time.  If he continued to get worse and not improved will consider pulmonary consult or repeating the CT scan of the chest.  Patient needs to follow-up with his pulmonologist after discharge.  Most likely will need tapering doses of prednisone.  I will start to taper his Solu-Medrol from tomorrow.     GERALD now resolved  Baseline creatinine near 0.7-0.9. Worsened to 1.77 on day of RRT due to shock. Had poor urine output, now improved after IVF resuscitation, creatinine back to normal, good oral intake, stop IV fluids on 9/18/2019  --Creatinine is now back to normal., hold nephrotoxic medications including combination lisinopril-hydrochlorothiazide.     Mild Anemia  Hgb 9.2 during RRT, improved to 12 this AM after fluid resuscitation. Did have 25ml EBL in OR   - Hemoglobin is stable at this time.      Hx Sarcoidosis  Possible Asthma  Continue PTA breo, prn albuterol inhaler  Started on IV Solu-Medrol 40 mg 3 times a day and DuoNeb nebulization.  Start tapering from tomorrow.     Hypertension  PTA lisinopril/hctz 20-12.5 BID, hold while with GERALD  - PRN hydralazine IV for SBP>160     Hyperlipidemia  PTA pravastatin 10mg, continue     Adjustment disorder with anxiety and depression  PTA wellbutrin, lamictal and lexapro, both continued while inpatient     Malignant melanoma of right thigh  Maintained on keytruda as outpatient     Hx migraines   Not currently with migraine, uses PRN imitrex at home.     Constipation  Had BM after laxative  keep him on as needed.    Mildly elevated lactic acid  Sepsis trigger because of the tachycardia, leukocytosis and tachypnea after his walk.  His shortness of breath is overall improved, his heart rate at rest is normal.  His leukocytosis because of IV Solu-Medrol.  I do not think so he has any worsening sepsis or septic shock at this time.  He is slightly volume overloaded so we are giving him diuretics.  His albumin is very low so we will give him a couple of doses of IV albumin.  Given dose of IV Lasix 20 mg times once now.  Repeat lactic acid in 4 hours.     DVT Prophylaxis: Heparin SQ  Code Status: Full Code     Disposition: Expected discharge in per orthopedic surgery service    Shortness of breath and symptoms get worse after stopping the IV hydrocortisone, I started him on IV Solu-Medrol 40 mg 3 times a day since then his breathing improved.  Possible asthma/sarcoidosis exacerbation.  Started on DuoNeb nebulization 4 times a day  His acute respiratory failure is improving currently on room air at this time.  Consider pulmonary consult and/or repeating a CT scan of the chest if symptoms does not improve.  Overall his symptoms are better as compared to yesterday.  Repeat dose of IV Lasix 20 mg times once now.  IV albumin 25 g x 2 doses today  Discontinue IV vancomycin  Repeat blood cultures remain negative for more than 4 days at this time.  Chest x-ray today shows improvement.  Repeat lactic acid in 4 to 6 hours.      Timi Land MD  Text Page  (7am - 6pm)    Interval History   Feeling much better as compared to yesterday today, he slept well overnight without the oxygen.  He is not using any oxygen this morning either.  Thing his shortness of breath is been getting better.  He does get some shortness of breath with exertion.  No fever chills chest pain abdominal pain dysuria hematuria constipation diarrhea.  He did notice that he still has some swelling in his legs.    Overnight event reviewed with the  nursing staff taking care of the patient.     -Data reviewed today: I reviewed all new labs and imaging results over the last 24 hours. I personally reviewed no images or EKG's today.    Physical Exam   Temp: 98  F (36.7  C) Temp src: Oral BP: 120/78   Heart Rate: 83 Resp: (!) 45 SpO2: 90 % O2 Device: None (Room air) Oxygen Delivery: 3 LPM  Vitals:    09/19/19 0612 09/22/19 0903 09/22/19 1110   Weight: 133.2 kg (293 lb 10.4 oz) 134.8 kg (297 lb 2.9 oz) 132.4 kg (291 lb 14.2 oz)     Vital Signs with Ranges  Temp:  [97.7  F (36.5  C)-98  F (36.7  C)] 98  F (36.7  C)  Heart Rate:  [] 83  Resp:  [16-56] 45  BP: (120-154)/(76-96) 120/78  SpO2:  [90 %-99 %] 90 %  I/O last 3 completed shifts:  In: 123 [P.O.:120; I.V.:3]  Out: 2710 [Urine:2710]    Constitutional: awake, alert, cooperative, no apparent distress, and appears stated age  Eyes: Lids and lashes normal, pupils equal, round and reactive to light, extra ocular muscles intact, sclera clear, conjunctiva normal  Respiratory:  Crackles postive at the bases bilaterally.  Cardiovascular: Normal apical impulse, regular rate and rhythm, normal S1 and S2, no S3 or S4, and no murmur noted  GI: No scars, normal bowel sounds, soft, non-distended, non-tender, no masses palpated, no hepatosplenomegally  Musculoskeletal: no lower extremity pitting edema present, Left BKA  Neurologic: no focal deficit.     Medications     - MEDICATION INSTRUCTIONS -       - MEDICATION INSTRUCTIONS -         albumin human  25 g Intravenous Q8H     buPROPion  75 mg Oral BID     cefTRIAXone  2 g Intravenous Q24H     escitalopram  10 mg Oral QAM     fluticasone-vilanterol  1 puff Inhalation Daily     gabapentin  600 mg Oral TID     heparin  5,000 Units Subcutaneous Q8H     ipratropium - albuterol 0.5 mg/2.5 mg/3 mL  3 mL Nebulization 4x daily     lamoTRIgine  100 mg Oral BID     methylPREDNISolone  40 mg Intravenous Q8H     milnacipran  50 mg Oral BID     pravastatin  10 mg Oral QPM      senna-docusate  1 tablet Per NG tube BID    Or     senna-docusate  2 tablet Oral BID     sodium chloride (PF)  3 mL Intracatheter Q8H     vancomycin (VANCOCIN) IV  2,000 mg Intravenous Q12H       Data   Recent Labs   Lab 09/22/19  0618 09/21/19  0644 09/20/19  1835 09/20/19  1632 09/20/19  0610  09/16/19  1345   WBC 17.1* 11.4*  --   --  12.1*   < > 6.9   HGB 10.6* 11.0*  --   --  9.8*   < > 9.2*   MCV 86 87  --   --  87   < > 88    239  --   --  235   < > 195    140  --  142 141   < > 140   POTASSIUM 4.1 4.2 3.1* 3.1* 3.2*   < > 4.1   CHLORIDE 106 107  --  107 106   < > 107   CO2 28 30  --  28 30   < > 17*   BUN 18 13  --  15 18   < > 8   CR 0.75 0.84  --  0.85 0.87   < > 0.73   ANIONGAP 5 3  --  7 5   < > 16*   QUE 8.5 8.3*  --  8.4* 8.0*   < > 7.5*   * 179*  --  155* 128*   < > 67*   ALBUMIN 2.5* 2.5*  --   --  2.3*   < > 1.4*   PROTTOTAL 6.4* 6.5*  --   --  5.6*   < > 3.0*   BILITOTAL 0.3 0.3  --   --  0.4   < > 0.5   ALKPHOS 122 142  --   --  115   < > 50   ALT 53 64  --   --  61   < > 14   AST 18 25  --   --  35   < > 20   TROPI  --   --   --   --   --   --  0.022    < > = values in this interval not displayed.       Recent Results (from the past 24 hour(s))   XR Chest Port 1 View    Narrative    XR CHEST PORT 1 VW  9/22/2019 8:30 AM     HISTORY:  PNA, follow up    COMPARISON: Film dated 9/20/2019    FINDINGS:  There has been improvement in the opacity at the left lung  base since the previous study. There is increased opacity in the right  midlung when compared to the previous film. There are interstitial  opacities in both lungs which may be due to pulmonary edema. The heart  is normal in size.      Impression    IMPRESSION: Improved focal left basilar opacity but increased focal  opacity in the right midlung zone. Focal opacity in the right midlung  could be due to pneumonia. Persistent interstitial opacities. The  interstitial opacities may be due to interstitial edema.    SUMMER FARRELL MD

## 2019-09-22 NOTE — PLAN OF CARE
A&O. Tachy at times. Sating in 90's on RA at rest, de-sats with exertion. Breathing labored and tachypneic at times. Increased pain today, PO dilaudid, flexeril, and tylenol weren't managing pain, IV dilaudid given x1 for breakthrough. Up Ax1 and crutches. Incision CDI, dressing changed, brace in place. Lactic acid elevated at 3.3, 500cc bolus given and albumin. Voiding, lasix, bedside urinal.

## 2019-09-22 NOTE — PROGRESS NOTES
[facility]    Sepsis Evaluation Progress Note    Date of Service: 09/22/2019    I was called to see Juancho Mohan due to abnormal vital signs triggering the Sepsis SIRS screening alert. He is known to have an infection.     Patient was just came back from the restroom and was tachycardic briefly and I saw him right away was feeling much better and HR was in 70's at that time.      Physical Exam    Vital Signs:  Temp: 98  F (36.7  C) Temp src: Oral BP: 120/78   Heart Rate: 83 Resp: (!) 45 SpO2: 90 % O2 Device: None (Room air) Oxygen Delivery: 3 LPM    Lab:  Lactic Acid   Date Value Ref Range Status   09/17/2019 1.7 0.7 - 2.0 mmol/L Final     Lactate for Sepsis Protocol   Date Value Ref Range Status   09/22/2019 2.7 (H) 0.7 - 2.0 mmol/L Final     Comment:     Significant value called to and read back by  SANDIP GUIDO IN MED SURG AT 1041 SM         The patient is at baseline mental status.    The rest of their physical exam is significant for crackles at the bases .    Assessment and Plan    The SIRS and exam findings are likely due to exertion , there is no sign of sepsis at this time.    Disposition: The patient will remain on the current unit. We will continue to monitor this patient closely.    Timi Land MD

## 2019-09-22 NOTE — PLAN OF CARE
A&Ox4.  AVSS, on O2 @ 3L/Oxymask.  IMC, ST  with BBB on the monitor.  Pain managed with PO Diluadid, Tylenol & Flexeril.  BS active, passing gas, stated he had BM previous shift.  LLE dressing CDI, protector in place.  Dangles at side of to void.  Slept in between cares.

## 2019-09-23 ENCOUNTER — APPOINTMENT (OUTPATIENT)
Dept: PHYSICAL THERAPY | Facility: CLINIC | Age: 49
DRG: 040 | End: 2019-09-23
Attending: ORTHOPAEDIC SURGERY
Payer: OTHER MISCELLANEOUS

## 2019-09-23 LAB
ALBUMIN SERPL-MCNC: 2.9 G/DL (ref 3.4–5)
ALP SERPL-CCNC: 100 U/L (ref 40–150)
ALT SERPL W P-5'-P-CCNC: 47 U/L (ref 0–70)
ANION GAP SERPL CALCULATED.3IONS-SCNC: 3 MMOL/L (ref 3–14)
AST SERPL W P-5'-P-CCNC: 16 U/L (ref 0–45)
BACTERIA SPEC CULT: NO GROWTH
BACTERIA SPEC CULT: NO GROWTH
BASOPHILS # BLD AUTO: 0 10E9/L (ref 0–0.2)
BASOPHILS NFR BLD AUTO: 0.1 %
BILIRUB SERPL-MCNC: 0.3 MG/DL (ref 0.2–1.3)
BUN SERPL-MCNC: 20 MG/DL (ref 7–30)
CALCIUM SERPL-MCNC: 8.4 MG/DL (ref 8.5–10.1)
CHLORIDE SERPL-SCNC: 106 MMOL/L (ref 94–109)
CO2 SERPL-SCNC: 29 MMOL/L (ref 20–32)
CREAT SERPL-MCNC: 0.78 MG/DL (ref 0.66–1.25)
DIFFERENTIAL METHOD BLD: ABNORMAL
EOSINOPHIL # BLD AUTO: 0 10E9/L (ref 0–0.7)
EOSINOPHIL NFR BLD AUTO: 0.1 %
ERYTHROCYTE [DISTWIDTH] IN BLOOD BY AUTOMATED COUNT: 13.4 % (ref 10–15)
GFR SERPL CREATININE-BSD FRML MDRD: >90 ML/MIN/{1.73_M2}
GLUCOSE SERPL-MCNC: 140 MG/DL (ref 70–99)
HCT VFR BLD AUTO: 30.9 % (ref 40–53)
HGB BLD-MCNC: 10 G/DL (ref 13.3–17.7)
IMM GRANULOCYTES # BLD: 0.4 10E9/L (ref 0–0.4)
IMM GRANULOCYTES NFR BLD: 3.1 %
LACTATE BLD-SCNC: 1.2 MMOL/L (ref 0.7–2)
LYMPHOCYTES # BLD AUTO: 0.8 10E9/L (ref 0.8–5.3)
LYMPHOCYTES NFR BLD AUTO: 6.2 %
Lab: NORMAL
Lab: NORMAL
MAGNESIUM SERPL-MCNC: 2.5 MG/DL (ref 1.6–2.3)
MCH RBC QN AUTO: 28.2 PG (ref 26.5–33)
MCHC RBC AUTO-ENTMCNC: 32.4 G/DL (ref 31.5–36.5)
MCV RBC AUTO: 87 FL (ref 78–100)
MONOCYTES # BLD AUTO: 0.6 10E9/L (ref 0–1.3)
MONOCYTES NFR BLD AUTO: 4.7 %
NEUTROPHILS # BLD AUTO: 10.7 10E9/L (ref 1.6–8.3)
NEUTROPHILS NFR BLD AUTO: 85.8 %
NRBC # BLD AUTO: 0 10*3/UL
NRBC BLD AUTO-RTO: 0 /100
PHOSPHATE SERPL-MCNC: 4.2 MG/DL (ref 2.5–4.5)
PLATELET # BLD AUTO: 296 10E9/L (ref 150–450)
POTASSIUM SERPL-SCNC: 4.4 MMOL/L (ref 3.4–5.3)
PROT SERPL-MCNC: 6.2 G/DL (ref 6.8–8.8)
RBC # BLD AUTO: 3.54 10E12/L (ref 4.4–5.9)
SODIUM SERPL-SCNC: 138 MMOL/L (ref 133–144)
SPECIMEN SOURCE: NORMAL
SPECIMEN SOURCE: NORMAL
WBC # BLD AUTO: 12.4 10E9/L (ref 4–11)

## 2019-09-23 PROCEDURE — 99233 SBSQ HOSP IP/OBS HIGH 50: CPT | Performed by: INTERNAL MEDICINE

## 2019-09-23 PROCEDURE — 80053 COMPREHEN METABOLIC PANEL: CPT | Performed by: INTERNAL MEDICINE

## 2019-09-23 PROCEDURE — 83605 ASSAY OF LACTIC ACID: CPT | Performed by: INTERNAL MEDICINE

## 2019-09-23 PROCEDURE — 25000128 H RX IP 250 OP 636: Performed by: INTERNAL MEDICINE

## 2019-09-23 PROCEDURE — 36415 COLL VENOUS BLD VENIPUNCTURE: CPT | Performed by: INTERNAL MEDICINE

## 2019-09-23 PROCEDURE — 94799 UNLISTED PULMONARY SVC/PX: CPT

## 2019-09-23 PROCEDURE — 12000047 ZZH R&B IMCU

## 2019-09-23 PROCEDURE — 85025 COMPLETE CBC W/AUTO DIFF WBC: CPT | Performed by: INTERNAL MEDICINE

## 2019-09-23 PROCEDURE — 84100 ASSAY OF PHOSPHORUS: CPT | Performed by: INTERNAL MEDICINE

## 2019-09-23 PROCEDURE — 25000132 ZZH RX MED GY IP 250 OP 250 PS 637: Performed by: ORTHOPAEDIC SURGERY

## 2019-09-23 PROCEDURE — 25000132 ZZH RX MED GY IP 250 OP 250 PS 637: Performed by: HOSPITALIST

## 2019-09-23 PROCEDURE — 83735 ASSAY OF MAGNESIUM: CPT | Performed by: INTERNAL MEDICINE

## 2019-09-23 PROCEDURE — 25000125 ZZHC RX 250: Performed by: INTERNAL MEDICINE

## 2019-09-23 PROCEDURE — 40000275 ZZH STATISTIC RCP TIME EA 10 MIN

## 2019-09-23 PROCEDURE — 94640 AIRWAY INHALATION TREATMENT: CPT | Mod: 76

## 2019-09-23 PROCEDURE — 97116 GAIT TRAINING THERAPY: CPT | Mod: GP

## 2019-09-23 PROCEDURE — 25000128 H RX IP 250 OP 636: Performed by: HOSPITALIST

## 2019-09-23 PROCEDURE — 25000131 ZZH RX MED GY IP 250 OP 636 PS 637: Performed by: INTERNAL MEDICINE

## 2019-09-23 PROCEDURE — 94640 AIRWAY INHALATION TREATMENT: CPT

## 2019-09-23 PROCEDURE — 12000000 ZZH R&B MED SURG/OB

## 2019-09-23 RX ORDER — FUROSEMIDE 10 MG/ML
40 INJECTION INTRAMUSCULAR; INTRAVENOUS ONCE
Status: COMPLETED | OUTPATIENT
Start: 2019-09-23 | End: 2019-09-23

## 2019-09-23 RX ORDER — PREDNISONE 20 MG/1
40 TABLET ORAL DAILY
Status: DISCONTINUED | OUTPATIENT
Start: 2019-09-23 | End: 2019-09-25

## 2019-09-23 RX ADMIN — HEPARIN SODIUM 5000 UNITS: 5000 INJECTION, SOLUTION INTRAVENOUS; SUBCUTANEOUS at 21:36

## 2019-09-23 RX ADMIN — MILNACIPRAN HYDROCHLORIDE 50 MG: 50 TABLET, FILM COATED ORAL at 20:29

## 2019-09-23 RX ADMIN — ACETAMINOPHEN 650 MG: 325 TABLET ORAL at 20:28

## 2019-09-23 RX ADMIN — HYDROMORPHONE HYDROCHLORIDE 4 MG: 2 TABLET ORAL at 08:18

## 2019-09-23 RX ADMIN — IPRATROPIUM BROMIDE AND ALBUTEROL SULFATE 3 ML: .5; 3 SOLUTION RESPIRATORY (INHALATION) at 16:43

## 2019-09-23 RX ADMIN — ESCITALOPRAM 10 MG: 5 TABLET, FILM COATED ORAL at 08:17

## 2019-09-23 RX ADMIN — PRAVASTATIN SODIUM 10 MG: 10 TABLET ORAL at 20:29

## 2019-09-23 RX ADMIN — HYDROMORPHONE HYDROCHLORIDE 4 MG: 2 TABLET ORAL at 01:55

## 2019-09-23 RX ADMIN — ACETAMINOPHEN 650 MG: 325 TABLET ORAL at 11:54

## 2019-09-23 RX ADMIN — ACETAMINOPHEN 650 MG: 325 TABLET ORAL at 16:08

## 2019-09-23 RX ADMIN — ACETAMINOPHEN 650 MG: 325 TABLET ORAL at 06:03

## 2019-09-23 RX ADMIN — IPRATROPIUM BROMIDE AND ALBUTEROL SULFATE 3 ML: .5; 3 SOLUTION RESPIRATORY (INHALATION) at 08:34

## 2019-09-23 RX ADMIN — HYDROMORPHONE HYDROCHLORIDE 4 MG: 2 TABLET ORAL at 11:54

## 2019-09-23 RX ADMIN — GABAPENTIN 600 MG: 300 CAPSULE ORAL at 08:17

## 2019-09-23 RX ADMIN — CYCLOBENZAPRINE HYDROCHLORIDE 10 MG: 10 TABLET, FILM COATED ORAL at 17:19

## 2019-09-23 RX ADMIN — ACETAMINOPHEN 650 MG: 325 TABLET ORAL at 01:56

## 2019-09-23 RX ADMIN — FLUTICASONE FUROATE AND VILANTEROL TRIFENATATE 1 PUFF: 100; 25 POWDER RESPIRATORY (INHALATION) at 08:28

## 2019-09-23 RX ADMIN — HYDROMORPHONE HYDROCHLORIDE 4 MG: 2 TABLET ORAL at 21:36

## 2019-09-23 RX ADMIN — BUPROPION HYDROCHLORIDE 75 MG: 75 TABLET, FILM COATED ORAL at 08:18

## 2019-09-23 RX ADMIN — MILNACIPRAN HYDROCHLORIDE 50 MG: 50 TABLET, FILM COATED ORAL at 08:17

## 2019-09-23 RX ADMIN — HYDROMORPHONE HYDROCHLORIDE 4 MG: 2 TABLET ORAL at 14:55

## 2019-09-23 RX ADMIN — GABAPENTIN 600 MG: 300 CAPSULE ORAL at 21:36

## 2019-09-23 RX ADMIN — FUROSEMIDE 40 MG: 10 INJECTION, SOLUTION INTRAVENOUS at 14:43

## 2019-09-23 RX ADMIN — LAMOTRIGINE 100 MG: 100 TABLET ORAL at 20:29

## 2019-09-23 RX ADMIN — SENNOSIDES AND DOCUSATE SODIUM 2 TABLET: 8.6; 5 TABLET ORAL at 08:17

## 2019-09-23 RX ADMIN — FUROSEMIDE 40 MG: 10 INJECTION, SOLUTION INTRAVENOUS at 08:18

## 2019-09-23 RX ADMIN — CYCLOBENZAPRINE HYDROCHLORIDE 10 MG: 10 TABLET, FILM COATED ORAL at 08:17

## 2019-09-23 RX ADMIN — BUPROPION HYDROCHLORIDE 75 MG: 75 TABLET, FILM COATED ORAL at 20:29

## 2019-09-23 RX ADMIN — GABAPENTIN 600 MG: 300 CAPSULE ORAL at 16:06

## 2019-09-23 RX ADMIN — HEPARIN SODIUM 5000 UNITS: 5000 INJECTION, SOLUTION INTRAVENOUS; SUBCUTANEOUS at 14:43

## 2019-09-23 RX ADMIN — IPRATROPIUM BROMIDE AND ALBUTEROL SULFATE 3 ML: .5; 3 SOLUTION RESPIRATORY (INHALATION) at 12:43

## 2019-09-23 RX ADMIN — HYDROMORPHONE HYDROCHLORIDE 4 MG: 2 TABLET ORAL at 04:57

## 2019-09-23 RX ADMIN — CEFTRIAXONE SODIUM 2 G: 2 INJECTION, POWDER, FOR SOLUTION INTRAMUSCULAR; INTRAVENOUS at 11:55

## 2019-09-23 RX ADMIN — PREDNISONE 40 MG: 20 TABLET ORAL at 08:17

## 2019-09-23 RX ADMIN — SENNOSIDES AND DOCUSATE SODIUM 2 TABLET: 8.6; 5 TABLET ORAL at 20:29

## 2019-09-23 RX ADMIN — LAMOTRIGINE 100 MG: 100 TABLET ORAL at 08:18

## 2019-09-23 RX ADMIN — HYDROMORPHONE HYDROCHLORIDE 4 MG: 2 TABLET ORAL at 18:25

## 2019-09-23 RX ADMIN — HEPARIN SODIUM 5000 UNITS: 5000 INJECTION, SOLUTION INTRAVENOUS; SUBCUTANEOUS at 06:04

## 2019-09-23 ASSESSMENT — ACTIVITIES OF DAILY LIVING (ADL)
ADLS_ACUITY_SCORE: 13
ADLS_ACUITY_SCORE: 13
ADLS_ACUITY_SCORE: 15
ADLS_ACUITY_SCORE: 13
ADLS_ACUITY_SCORE: 13
ADLS_ACUITY_SCORE: 15

## 2019-09-23 ASSESSMENT — MIFFLIN-ST. JEOR: SCORE: 2269

## 2019-09-23 NOTE — PLAN OF CARE
"Off IMC, cont on tele/pulse ox monitoring. Sinus tach and HTN at times. Not meeting PRN parameters. Afebrile. Dressing to stump changed per POC, sutures well approximated, no drainage noted and minimal swelling. Ambulated down hallway with minimal assist, crutches and GB. Tolerated well, although some BEY/diaphoretic. Recovered quickly. Increased UOP, diluted in appearance, receiving lasix. Pain controlled with all PRNs given as available, pt states \"feels better than yesterday\", but still c/o pressure at stump, phantom toe pain and rates 7-8/10.   "

## 2019-09-23 NOTE — PLAN OF CARE
Discharge Planner PT   Patient plan for discharge: home   Current status: Pt transferred supine to sit on EOB independent. Pt O2 sats dropped to 86% on RA and needed time to get up to 90%. Pt transferred sit to/from stand with SBA to crutches. Pt ambulated 200' with crutches and CGA. Pt performed ambulation with a hop to pattern on R LE. Pt O2 sats 80-90% throughout ambulation on RA. Pt performed 3 steps x2 with B crutches and a hop to pattern when ascending and one rail and one crutch during descent. Pt needs mod-Aissatou to descend due to slight LOB backwards. Pt O2 sats dropped to 78% following first trial of stairs and needed a sitting rest break to get O2 up to 90% after 3 minutes. Pt noted no dizziness or SOB. Pt left sitting in chair in bathroom, wife present and nurse notified.    Barriers to return to prior living situation: Medical stability, impaired activity tolerance  Recommendations for discharge: Home with A for stair navigation per plan established by the PT.  Rationale for recommendations: Pt mobilizing well CGA-SBA with crutches. Limited by fatigue, SOB. Anticipate with further medical management pt will progress to meet goals prior to discharge. At this time would predict will need CGA to navigate stairs however may progress to SBA or mod I for this       Entered by: Stephanie Jimenes 09/23/2019 10:55 AM

## 2019-09-23 NOTE — PROGRESS NOTES
[facility]    HOSPITALIST PROGRESS NOTE :   --------------------------------------------------    Date of Admission:  9/12/2019    Cumulative Summary: Juancho Mohan is a 48 year old male with past medical history significant for sarcoidosis, sleep apnea, GERD, migraine, essential hypertension who was admitted on September 12 for elective left below-knee amputation by orthopedic surgery for chronic pain after Worker's Comp. injury many years ago.  He was progressing well after surgery, was transitioned to p.o. pain regimen and was planning to be discharged home with home health care.  Patient had a sudden deterioration and on September 16, RRT was called and he was found to be mottled with cool extremities, lethargic with fever, hypertension, tachycardic and hypoxic on supplemental oxygen.  He was complaining of mild dizziness, minimal urine output was also noticed, he was placed on BiPAP and was transferred to ICU for close monitoring and was a started on broad-spectrum antibiotics vancomycin and Zosyn considering sepsis protocol along with IV fluid resuscitation.  CT scan of the head and CT scan of the chest for PE to rule out was negative.  He was extubated on September 17 and was able to weaned off levo fed.  2 out of 2 blood cultures grew Streptococcus mitis from September 16th but since then his blood cultures have been negative.    Assessment & Plan     Active Problems:  Status post elective left below-knee amputation due to chronic pain on September 12  Septic shock, resolved:  Metabolic encephalopathy secondary to sepsis with gram-positive bacteremia, resolved  Strep bacteremia, 2 out of 2 blood cultures positive with a Streptococcus mitis:    Patient initially did very well after his initial surgery, then had RRT on September 16 requiring initiation of sepsis protocol for likely infection.  CT scan of the chest showed bilateral lower lobe pneumonia, blood cultures were positive and  then he started to grow Streptococcus mittens 2 out of 2.  He was initially encephalopathic, very unresponsive even after IV fluid bolus but has much improved than.  He was able to come off pressors and lactate was normalized.  His WBC count of normal and normal range.  Initially his procalcitonin was as high as 93.5.  Patient was seen and examined this morning, care was assumed, patient was sitting, his main complaint remains shortness of breath on minimal exertion.  Seems to be most likely secondary to fluid resuscitation.    --Continue to monitor patient closely on telemetry.  --Patient has so far received 7 days of IV antibiotics, continue patient on ceftriaxone 2 g IV 24 hours.  -- Discontinue IV  steroids ad start patient on oral prednisone   -- Give patient lasix 40 mg IV BID for 2 doses today . Has received one dose this morning , next dose at 1400   --Strict intake and output.  --Daily weight.  --Continue patient on DuoNeb 4 times daily.  --Continue flutter valve.  -- Continue incentive spirometry.  --Patient has normal lactic acid and his leukocyte counts have downward trend this morning, most likely upward trend was secondary to highdose of steroid we will continue to monitor closely.    Acute hypoxic respiratory failure requiring intubation: Resolving now on room air although still getting short of breath easily, and his oxygen saturation drops with exertion.  On September 16 RRT was called was found to have oxygen saturation of 82 to 83% on 6 L of oxygen, was placed on BiPAP but became unresponsive and required intubation, patient was able to be extubated on September 17 successfully.  Bilateral lower lobe pneumonia  Possible sarcoidosis/asthma exacerbation:    --As above continue patient on IV antibiotics.  --Aggressive pulmonary toilet with incentive spirometry and flutter valve.  T--here is also concern for significant fluid overload, BNP is increased and weight has been up significantly since his  admission.  Patient has been receiving IV Lasix intermittently since September 20 and having good urine output.  --Echocardiogram was done during this admission which showed ejection fraction of 65 to 70%, RV function is borderline reduced with preserved motion at the base but relative hypokinesis of the mid and distal free wall.  --Continue Breo Ellipta.  --Patient will need outpatient follow-up with pulmonary after discharge.  --I did have discussion with patient regarding use of the steroids as an outpatient. According to patient he has only used 4 days of oral steroids after his chemotherapy.   --Will stop IV Solu-Medrol and will switch him to oral prednisone 40 mg p.o. daily.    Acute kidney injury: Now resolved, baseline creatinine is near 0.7-0.9.  Worsened to 1.7 on the day of RRT due to shock and initially he also had poor urine output now it is improved after IV fluid resuscitation, creatinine is back to normal with good oral intake patient has been off IV fluids since September 18.  --Continue to monitor closely, hold nephrotoxic agents.  --Will hold lisinopril and hydrochlorothiazide.  --We will give patient couple of doses of Lasix today.    Mild anemia: Hemoglobin is a stable now  --Continue to monitor from time to time.    Essential hypertension; his home medications include lisinopril/hydrochlorothiazide 20/12.5 mg p.o.twice daily   Hyperlipidemia:  --Continue to hold hydrochlorothiazide/lisinopril at this point.  --As above the starting patient on Lasix 40 mg IV twice daily for now.  --Continue patient on pravastatin 10 mg p.o. daily.    Adjustment disorder with anxiety and depression  --PTA medications include Wellbutrin, Lamictal and Lexapro, continue those medications at the current dose inpatient.    Malignant melanoma of right thigh  --Maintained on Keytruda as an outpatient.    History of migraines  --Currently no issues with migraines, uses as needed Imitrex at  home.    Constipation  --Continue current bowel regimen.    Diet: Diet  Regular Diet Adult    Fitzgerald Catheter: not present  DVT Prophylaxis: Heparin SQ  Code Status: Full Code    The patient's care was discussed with the Bedside Nurse, Patient and Patient's Family.    Disposition Plan   Expected discharge: tentative discharge on Wednesday if continues to make clinical improvement and is done with diuresis   Final disposition per orthopedic surgery, will continue to follow closely     Marcy Lopez MD, FACP  Text Page (7am - 6pm)    ----------------------------------------------------------------------------------------------------------------------      Interval History   Patient care was assumed this morning , seen and examined , family also present in the room.  Patient told me that she is a starting to feel better although he still is concerned about getting significantly short of breath with minimal exertion, we discussed about him being fluid overloaded and increase meat and plan to diurese him aggressively in the next day or 2-day, patient told me that he thinks that overall he is making improvement.  patient was also complaining of some pain and discomfort and I encouraged him to continue to have discussion with surgery team.    -Data reviewed today: I reviewed all new labs and imaging results over the last 24 hours.    I personally reviewed no images or EKG's today.    Physical Exam   Temp: 97.4  F (36.3  C) Temp src: Oral BP: 115/71 Pulse: 72 Heart Rate: 94 Resp: 20 SpO2: 94 % O2 Device: None (Room air)    Vitals:    09/22/19 0903 09/22/19 1110 09/23/19 0700   Weight: 134.8 kg (297 lb 2.9 oz) 132.4 kg (291 lb 14.2 oz) 135.1 kg (297 lb 13.5 oz)     Vital Signs with Ranges  Temp:  [97.4  F (36.3  C)-97.7  F (36.5  C)] 97.4  F (36.3  C)  Pulse:  [72-77] 72  Heart Rate:  [] 94  Resp:  [14-26] 20  BP: (115-140)/(71-98) 115/71  SpO2:  [91 %-98 %] 94 %  I/O last 3 completed shifts:  In: 3 [I.V.:3]  Out: 1750  [Urine:1750]    GENERAL: Alert , awake and oriented. NAD. Conversational, appropriate.  Family present at the bedside,  HEENT: Normocephalic. EOMI. No icterus or injection. Nares normal.   LUNGS: Clear to auscultation except bilateral crackles in the bases. No dyspnea at rest.   HEART: Regular rate. Extremities perfused.   ABDOMEN: Soft, nontender, and nondistended. Positive bowel sounds.   EXTREMITIES: No LE edema noted.   NEUROLOGIC: Moves extremities x4 on command. No acute focal neurologic abnormalities noted.     Medications     - MEDICATION INSTRUCTIONS -       - MEDICATION INSTRUCTIONS -         buPROPion  75 mg Oral BID     cefTRIAXone  2 g Intravenous Q24H     escitalopram  10 mg Oral QAM     fluticasone-vilanterol  1 puff Inhalation Daily     gabapentin  600 mg Oral TID     heparin  5,000 Units Subcutaneous Q8H     ipratropium - albuterol 0.5 mg/2.5 mg/3 mL  3 mL Nebulization 4x daily     lamoTRIgine  100 mg Oral BID     milnacipran  50 mg Oral BID     pravastatin  10 mg Oral QPM     predniSONE  40 mg Oral Daily     senna-docusate  1 tablet Per NG tube BID    Or     senna-docusate  2 tablet Oral BID     sodium chloride (PF)  3 mL Intracatheter Q8H       Data   Recent Labs   Lab 09/23/19  0621 09/22/19  0618 09/21/19  0644  09/16/19  1345   WBC 12.4* 17.1* 11.4*   < > 6.9   HGB 10.0* 10.6* 11.0*   < > 9.2*   MCV 87 86 87   < > 88    287 239   < > 195    139 140   < > 140   POTASSIUM 4.4 4.1 4.2   < > 4.1   CHLORIDE 106 106 107   < > 107   CO2 29 28 30   < > 17*   BUN 20 18 13   < > 8   CR 0.78 0.75 0.84   < > 0.73   ANIONGAP 3 5 3   < > 16*   QUE 8.4* 8.5 8.3*   < > 7.5*   * 153* 179*   < > 67*   ALBUMIN 2.9* 2.5* 2.5*   < > 1.4*   PROTTOTAL 6.2* 6.4* 6.5*   < > 3.0*   BILITOTAL 0.3 0.3 0.3   < > 0.5   ALKPHOS 100 122 142   < > 50   ALT 47 53 64   < > 14   AST 16 18 25   < > 20   TROPI  --   --   --   --  0.022    < > = values in this interval not displayed.       Imaging:   No results  found for this or any previous visit (from the past 24 hour(s)).

## 2019-09-23 NOTE — PLAN OF CARE
A&Ox4.  AVSS, on RA.  IMC, SR w/ BBB on the monitor.  PO Dilaudid, Tylenol and Flexeril for pain control.  IV Dilaudid given x 1 for breakthrough at the beginning of the shift.  LS dim.  BS active.  Voiding per urinal.  L stump with dressing CDI, protector/brace in place.  Slept in between care.

## 2019-09-23 NOTE — PLAN OF CARE
"Alert, oriented x4.  Pain managed with scheduled gabapentin, PRN dilaudid given 4mg q3h, PRN tylenol given q4h, PRN flexeril given q8h.  Rated his pain 7/10 most of today, but when asked if there is more I could do or if he would like me to communicate anything to his providers, states \"my pain is better than yesterday.\"      Afebrile.  Tele SR sinus ian occasionally into 50's.  IV saline locked, used for IV rocephin and IV lasix.  O2 >90% on room air except when exerting self (noted pt holds his breath and grunts when pushing self to edge of bed sats dip to 86%, encouraged pt to take it slow and breathe), occasional high 80's during sleep.      Fall risk, but using crutches well with SBA.  Voiding per urinal, see output.        Per IMC Goals to be met before transfer from Intermediate Care (IMC) status:   ~ Return to baseline functional status; partially met  ~Dyspnea improved and oxygen saturations greater than 88% on room air or prior home oxygen levels; partially met   ~ ECHO or other diagnostic tests and consults completed and resulted; met  ~ Vital signs normal or at patient baseline; partially met  Nurse to notify Provider when Discharge goals have been met and patient is ready for transfer from Intermediate Care (IMC) status.  "
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
## 2019-09-23 NOTE — PROGRESS NOTES
[facility] Progress Note  [facility]     visited per follow-up LOS.  Pt stated he is feeling better everyday and is happy with his progress. He is looking forward to going home soon, but stated he promised not to ask every doctor when and follow their recommendations. Pt again noted a strong community of support from friends and family for both him and his wife.   offered prayer.  [facility] continues to be available if needed.      Mariano Hidalgo   Intern

## 2019-09-24 ENCOUNTER — ANESTHESIA EVENT (OUTPATIENT)
Dept: MEDSURG UNIT | Facility: CLINIC | Age: 49
End: 2019-09-24

## 2019-09-24 ENCOUNTER — ANESTHESIA (OUTPATIENT)
Dept: MEDSURG UNIT | Facility: CLINIC | Age: 49
End: 2019-09-24

## 2019-09-24 ENCOUNTER — APPOINTMENT (OUTPATIENT)
Dept: PHYSICAL THERAPY | Facility: CLINIC | Age: 49
DRG: 040 | End: 2019-09-24
Attending: ORTHOPAEDIC SURGERY
Payer: OTHER MISCELLANEOUS

## 2019-09-24 LAB
ALBUMIN SERPL-MCNC: 2.8 G/DL (ref 3.4–5)
ALP SERPL-CCNC: 94 U/L (ref 40–150)
ALT SERPL W P-5'-P-CCNC: 42 U/L (ref 0–70)
ANION GAP SERPL CALCULATED.3IONS-SCNC: 5 MMOL/L (ref 3–14)
AST SERPL W P-5'-P-CCNC: 30 U/L (ref 0–45)
BACTERIA SPEC CULT: ABNORMAL
BACTERIA SPEC CULT: NO GROWTH
BILIRUB SERPL-MCNC: 0.5 MG/DL (ref 0.2–1.3)
BUN SERPL-MCNC: 21 MG/DL (ref 7–30)
CALCIUM SERPL-MCNC: 8.1 MG/DL (ref 8.5–10.1)
CHLORIDE SERPL-SCNC: 104 MMOL/L (ref 94–109)
CO2 SERPL-SCNC: 32 MMOL/L (ref 20–32)
CREAT SERPL-MCNC: 0.92 MG/DL (ref 0.66–1.25)
ERYTHROCYTE [DISTWIDTH] IN BLOOD BY AUTOMATED COUNT: 13.7 % (ref 10–15)
GFR SERPL CREATININE-BSD FRML MDRD: >90 ML/MIN/{1.73_M2}
GLUCOSE SERPL-MCNC: 84 MG/DL (ref 70–99)
HCT VFR BLD AUTO: 32.8 % (ref 40–53)
HGB BLD-MCNC: 10.8 G/DL (ref 13.3–17.7)
Lab: ABNORMAL
Lab: NORMAL
MAGNESIUM SERPL-MCNC: 2.3 MG/DL (ref 1.6–2.3)
MCH RBC QN AUTO: 28.8 PG (ref 26.5–33)
MCHC RBC AUTO-ENTMCNC: 32.9 G/DL (ref 31.5–36.5)
MCV RBC AUTO: 88 FL (ref 78–100)
PHOSPHATE SERPL-MCNC: 4 MG/DL (ref 2.5–4.5)
PLATELET # BLD AUTO: 323 10E9/L (ref 150–450)
POTASSIUM SERPL-SCNC: 3.9 MMOL/L (ref 3.4–5.3)
PROT SERPL-MCNC: 6.1 G/DL (ref 6.8–8.8)
RBC # BLD AUTO: 3.75 10E12/L (ref 4.4–5.9)
SODIUM SERPL-SCNC: 141 MMOL/L (ref 133–144)
SPECIMEN SOURCE: ABNORMAL
SPECIMEN SOURCE: NORMAL
WBC # BLD AUTO: 9.5 10E9/L (ref 4–11)

## 2019-09-24 PROCEDURE — 25000132 ZZH RX MED GY IP 250 OP 250 PS 637: Performed by: HOSPITALIST

## 2019-09-24 PROCEDURE — 25000128 H RX IP 250 OP 636: Performed by: INTERNAL MEDICINE

## 2019-09-24 PROCEDURE — 94640 AIRWAY INHALATION TREATMENT: CPT | Mod: 76

## 2019-09-24 PROCEDURE — 12000000 ZZH R&B MED SURG/OB

## 2019-09-24 PROCEDURE — 37000011 ZZH ANESTHESIA WARD SERVICE

## 2019-09-24 PROCEDURE — 25000125 ZZHC RX 250: Performed by: INTERNAL MEDICINE

## 2019-09-24 PROCEDURE — 36415 COLL VENOUS BLD VENIPUNCTURE: CPT | Performed by: HOSPITALIST

## 2019-09-24 PROCEDURE — 12000047 ZZH R&B IMCU

## 2019-09-24 PROCEDURE — 85027 COMPLETE CBC AUTOMATED: CPT | Performed by: HOSPITALIST

## 2019-09-24 PROCEDURE — 97116 GAIT TRAINING THERAPY: CPT | Mod: GP

## 2019-09-24 PROCEDURE — 25000128 H RX IP 250 OP 636: Performed by: HOSPITALIST

## 2019-09-24 PROCEDURE — 83735 ASSAY OF MAGNESIUM: CPT | Performed by: HOSPITALIST

## 2019-09-24 PROCEDURE — 80053 COMPREHEN METABOLIC PANEL: CPT | Performed by: HOSPITALIST

## 2019-09-24 PROCEDURE — 40000275 ZZH STATISTIC RCP TIME EA 10 MIN

## 2019-09-24 PROCEDURE — 25000131 ZZH RX MED GY IP 250 OP 636 PS 637: Performed by: INTERNAL MEDICINE

## 2019-09-24 PROCEDURE — 99233 SBSQ HOSP IP/OBS HIGH 50: CPT | Performed by: INTERNAL MEDICINE

## 2019-09-24 PROCEDURE — 25000132 ZZH RX MED GY IP 250 OP 250 PS 637: Performed by: ORTHOPAEDIC SURGERY

## 2019-09-24 PROCEDURE — 84100 ASSAY OF PHOSPHORUS: CPT | Performed by: HOSPITALIST

## 2019-09-24 PROCEDURE — 94640 AIRWAY INHALATION TREATMENT: CPT

## 2019-09-24 PROCEDURE — 99207 ZZC CDG-MDM COMPONENT: MEETS MODERATE - UP CODED: CPT | Performed by: INTERNAL MEDICINE

## 2019-09-24 RX ORDER — FUROSEMIDE 10 MG/ML
40 INJECTION INTRAMUSCULAR; INTRAVENOUS
Status: COMPLETED | OUTPATIENT
Start: 2019-09-24 | End: 2019-09-24

## 2019-09-24 RX ADMIN — FUROSEMIDE 40 MG: 10 INJECTION, SOLUTION INTRAVENOUS at 16:05

## 2019-09-24 RX ADMIN — SENNOSIDES AND DOCUSATE SODIUM 2 TABLET: 8.6; 5 TABLET ORAL at 07:56

## 2019-09-24 RX ADMIN — CEFTRIAXONE SODIUM 2 G: 2 INJECTION, POWDER, FOR SOLUTION INTRAMUSCULAR; INTRAVENOUS at 11:52

## 2019-09-24 RX ADMIN — ACETAMINOPHEN 650 MG: 325 TABLET ORAL at 22:51

## 2019-09-24 RX ADMIN — IPRATROPIUM BROMIDE AND ALBUTEROL SULFATE 3 ML: .5; 3 SOLUTION RESPIRATORY (INHALATION) at 11:58

## 2019-09-24 RX ADMIN — CYCLOBENZAPRINE HYDROCHLORIDE 10 MG: 10 TABLET, FILM COATED ORAL at 13:51

## 2019-09-24 RX ADMIN — HEPARIN SODIUM 5000 UNITS: 5000 INJECTION, SOLUTION INTRAVENOUS; SUBCUTANEOUS at 05:22

## 2019-09-24 RX ADMIN — HYDROMORPHONE HYDROCHLORIDE 4 MG: 2 TABLET ORAL at 04:15

## 2019-09-24 RX ADMIN — HEPARIN SODIUM 5000 UNITS: 5000 INJECTION, SOLUTION INTRAVENOUS; SUBCUTANEOUS at 21:34

## 2019-09-24 RX ADMIN — HYDROMORPHONE HYDROCHLORIDE 4 MG: 2 TABLET ORAL at 07:41

## 2019-09-24 RX ADMIN — CYCLOBENZAPRINE HYDROCHLORIDE 10 MG: 10 TABLET, FILM COATED ORAL at 09:10

## 2019-09-24 RX ADMIN — GABAPENTIN 600 MG: 300 CAPSULE ORAL at 21:34

## 2019-09-24 RX ADMIN — GABAPENTIN 600 MG: 300 CAPSULE ORAL at 16:05

## 2019-09-24 RX ADMIN — SENNOSIDES AND DOCUSATE SODIUM 1 TABLET: 8.6; 5 TABLET ORAL at 21:34

## 2019-09-24 RX ADMIN — HEPARIN SODIUM 5000 UNITS: 5000 INJECTION, SOLUTION INTRAVENOUS; SUBCUTANEOUS at 14:07

## 2019-09-24 RX ADMIN — GABAPENTIN 600 MG: 300 CAPSULE ORAL at 07:55

## 2019-09-24 RX ADMIN — MILNACIPRAN HYDROCHLORIDE 50 MG: 50 TABLET, FILM COATED ORAL at 21:34

## 2019-09-24 RX ADMIN — HYDROMORPHONE HYDROCHLORIDE 4 MG: 2 TABLET ORAL at 00:42

## 2019-09-24 RX ADMIN — MILNACIPRAN HYDROCHLORIDE 50 MG: 50 TABLET, FILM COATED ORAL at 07:56

## 2019-09-24 RX ADMIN — ACETAMINOPHEN 650 MG: 325 TABLET ORAL at 09:10

## 2019-09-24 RX ADMIN — FLUTICASONE FUROATE AND VILANTEROL TRIFENATATE 1 PUFF: 100; 25 POWDER RESPIRATORY (INHALATION) at 08:18

## 2019-09-24 RX ADMIN — LAMOTRIGINE 100 MG: 100 TABLET ORAL at 21:34

## 2019-09-24 RX ADMIN — LAMOTRIGINE 100 MG: 100 TABLET ORAL at 07:55

## 2019-09-24 RX ADMIN — HYDROMORPHONE HYDROCHLORIDE 4 MG: 2 TABLET ORAL at 22:51

## 2019-09-24 RX ADMIN — HYDROMORPHONE HYDROCHLORIDE 4 MG: 2 TABLET ORAL at 16:05

## 2019-09-24 RX ADMIN — HYDROMORPHONE HYDROCHLORIDE 0.5 MG: 1 INJECTION, SOLUTION INTRAMUSCULAR; INTRAVENOUS; SUBCUTANEOUS at 21:31

## 2019-09-24 RX ADMIN — PREDNISONE 40 MG: 20 TABLET ORAL at 07:55

## 2019-09-24 RX ADMIN — BUPROPION HYDROCHLORIDE 75 MG: 75 TABLET, FILM COATED ORAL at 07:56

## 2019-09-24 RX ADMIN — PRAVASTATIN SODIUM 10 MG: 10 TABLET ORAL at 21:34

## 2019-09-24 RX ADMIN — IPRATROPIUM BROMIDE AND ALBUTEROL SULFATE 3 ML: .5; 3 SOLUTION RESPIRATORY (INHALATION) at 14:58

## 2019-09-24 RX ADMIN — BUPROPION HYDROCHLORIDE 75 MG: 75 TABLET, FILM COATED ORAL at 21:34

## 2019-09-24 RX ADMIN — IPRATROPIUM BROMIDE AND ALBUTEROL SULFATE 3 ML: .5; 3 SOLUTION RESPIRATORY (INHALATION) at 08:20

## 2019-09-24 RX ADMIN — ACETAMINOPHEN 650 MG: 325 TABLET ORAL at 19:00

## 2019-09-24 RX ADMIN — CYCLOBENZAPRINE HYDROCHLORIDE 10 MG: 10 TABLET, FILM COATED ORAL at 00:42

## 2019-09-24 RX ADMIN — FUROSEMIDE 40 MG: 10 INJECTION, SOLUTION INTRAVENOUS at 08:12

## 2019-09-24 RX ADMIN — HYDROMORPHONE HYDROCHLORIDE 4 MG: 2 TABLET ORAL at 19:00

## 2019-09-24 RX ADMIN — IPRATROPIUM BROMIDE AND ALBUTEROL SULFATE 3 ML: .5; 3 SOLUTION RESPIRATORY (INHALATION) at 18:48

## 2019-09-24 RX ADMIN — HYDROMORPHONE HYDROCHLORIDE 4 MG: 2 TABLET ORAL at 12:01

## 2019-09-24 RX ADMIN — ACETAMINOPHEN 650 MG: 325 TABLET ORAL at 13:51

## 2019-09-24 RX ADMIN — ESCITALOPRAM 10 MG: 5 TABLET, FILM COATED ORAL at 07:54

## 2019-09-24 RX ADMIN — CYCLOBENZAPRINE HYDROCHLORIDE 10 MG: 10 TABLET, FILM COATED ORAL at 21:34

## 2019-09-24 ASSESSMENT — MIFFLIN-ST. JEOR: SCORE: 2231

## 2019-09-24 ASSESSMENT — ACTIVITIES OF DAILY LIVING (ADL)
ADLS_ACUITY_SCORE: 15
ADLS_ACUITY_SCORE: 14
ADLS_ACUITY_SCORE: 15
ADLS_ACUITY_SCORE: 15

## 2019-09-24 NOTE — PLAN OF CARE
Neuro: AOx4. L BKA; femoral pulse +2.     Respiratory: RA. VSS.     Cardiac: WNL.     GI/: Bowel sounds hypoactive. BM 09/22; adequate urine OP.     Skin: UTV surgical site. Ortho brace in place. Dressing CDI.     Pain: PRN administered per MAR.     Mobility: Ax1 with crutches and GB.        Will monitor. To be discharge once tolerating PO analgesics.

## 2019-09-24 NOTE — PLAN OF CARE
PT: Session attempted. Pt/family meeting with provider for extended time at bedside. Unable to see for PT this morning.

## 2019-09-24 NOTE — PROGRESS NOTES
[facility]    HOSPITALIST PROGRESS NOTE :   --------------------------------------------------    Date of Admission:  9/12/2019    Cumulative Summary: Juancho Mohan is a 48 year old male with past medical history significant for sarcoidosis, sleep apnea, GERD, migraine, essential hypertension who was admitted on September 12 for elective left below-knee amputation by orthopedic surgery for chronic pain after Worker's Comp. injury many years ago.  He was progressing well after surgery, was transitioned to p.o. pain regimen and was planning to be discharged home with home health care.  Patient had a sudden deterioration and on September 16, RRT was called and he was found to be mottled with cool extremities, lethargic with fever, hypertension, tachycardic and hypoxic on supplemental oxygen.  He was complaining of mild dizziness, minimal urine output was also noticed, he was placed on BiPAP and was transferred to ICU for close monitoring and was a started on broad-spectrum antibiotics vancomycin and Zosyn considering sepsis protocol along with IV fluid resuscitation.  CT scan of the head and CT scan of the chest for PE to rule out was negative.  He was extubated on September 17 and was able to weaned off levo fed.  2 out of 2 blood cultures grew Streptococcus mitis from September 16th but since then his blood cultures have been negative.    Assessment & Plan     Active Problems:  Status post elective left below-knee amputation due to chronic pain on September 12th  Septic shock, resolved:  Metabolic encephalopathy secondary to sepsis with gram-positive bacteremia, resolved  Strep bacteremia, 2 out of 2 blood cultures positive with a Streptococcus mitis:    Patient initially did very well after his initial surgery, then had RRT on September 16 requiring initiation of sepsis protocol for likely infection.  CT scan of the chest showed bilateral lower lobe pneumonia, blood cultures were positive and  then he started to grow Streptococcus mittens 2 out of 2.  He was initially encephalopathic, very unresponsive even after IV fluid bolus but has much improved than.  He was able to come off pressors and lactate was normalized.  His WBC count of normal and normal range.  Initially his procalcitonin was as high as 93.5.  Patient was seen and examined this morning, care was assumed, patient was sitting, his main complaint remains shortness of breath on minimal exertion.  Seems to be most likely secondary to fluid resuscitation.    --Continue to monitor patient closely on telemetry.  --Patient has so far received 7 days of IV antibiotics, continue patient on ceftriaxone 2 g IV 24 hours.  -- Discontinued IV  steroids ad start patient on oral prednisone since yesterday , will continue him on Prednisone 40 mg today with plan to start tapering him over the next 10-12 days slowly   -- Give patient lasix 40 mg IV BID for 2 doses today , good urine out put yesterday , weight is down to 289 lbs from 297 lbs, baseline weight is around 269 lbs.   --Strict intake and output.  --Daily weight.  --Continue patient on DuoNeb 4 times daily.  --Continue flutter valve.  -- Continue incentive spirometry.  --Patient has normal lactic acid and his leukocyte counts have downward trend this morning, most likely upward trend was secondary to highdose of steroid we will continue to monitor closely.    Acute hypoxic respiratory failure requiring intubation: Resolving now on room air although still getting short of breath easily, and his oxygen saturation drops with exertion.  On September 16 RRT was called was found to have oxygen saturation of 82 to 83% on 6 L of oxygen, was placed on BiPAP but became unresponsive and required intubation, patient was able to be extubated on September 17 successfully.  Bilateral lower lobe pneumonia  Possible sarcoidosis/asthma exacerbation:    --As above continue patient on IV antibiotics.  --Aggressive  pulmonary toilet with incentive spirometry and flutter valve.  --There is also concern for significant fluid overload, BNP is increased and weight has been up significantly since his admission.    -- plan for 2 doses of lasix as above   --Echocardiogram was done during this admission which showed ejection fraction of 65 to 70%, RV function is borderline reduced with preserved motion at the base but relative hypokinesis of the mid and distal free wall.  --Continue Breo Ellipta.  --Patient will need outpatient follow-up with pulmonary after discharge.  --I did have discussion with patient regarding use of the steroids as an outpatient. According to patient he has only used 4 days of oral steroids after his chemotherapy.   --oral prednisone 40 mg p.o. daily for 3 days , then will switch to 30 mg for 3 days , then 20 mg for 3 days and then 10 mg for 3 days     Acute kidney injury: Now resolved, baseline creatinine is near 0.7-0.9.  Worsened to 1.7 on the day of RRT due to shock and initially he also had poor urine output now it is improved after IV fluid resuscitation, creatinine is back to normal with good oral intake patient has been off IV fluids since September 18.    --Continue to monitor closely, hold nephrotoxic agents.  --Will hold lisinopril and hydrochlorothiazide.  --We will give patient couple of doses of Lasix today.    Mild anemia: Hemoglobin is a stable now  --Continue to monitor from time to time.    Essential hypertension; his home medications include lisinopril/hydrochlorothiazide 20/12.5 mg p.o.twice daily   Hyperlipidemia:  --Continue to hold hydrochlorothiazide/lisinopril at this point.  --As above the starting patient on Lasix 40 mg IV twice daily for now.  --Continue patient on pravastatin 10 mg p.o. daily.    Adjustment disorder with anxiety and depression  --PTA medications include Wellbutrin, Lamictal and Lexapro, continue those medications at the current dose inpatient.    Malignant melanoma  of right thigh  --Maintained on Keytruda as an outpatient.    History of migraines  --Currently no issues with migraines, uses as needed Imitrex at home.    Constipation  --Continue current bowel regimen.    Diet: Diet  Regular Diet Adult    Fitzgerald Catheter: not present  DVT Prophylaxis: Heparin SQ  Code Status: Full Code    The patient's care was discussed with the Bedside Nurse, Patient and Patient's Family.    Disposition Plan   Expected discharge: tentative discharge on Thursday morning if continues to make clinical improvement and is done with diuresis   Final disposition per orthopedic surgery, will continue to follow closely   Discussed with patient that hopefully he should be able to go through his melanoma treatment with keytruda on coming Friday     Marcy Lopez MD, FACP  Text Page (7am - 6pm)    ----------------------------------------------------------------------------------------------------------------------      Interval History   Patient was seen and examined , family also present in the room.  Patient told me that he is a starting to feel better, weight is coming down , discussion regarding Keytruda treatment on coming Friday and plan for further diuresis today     -Data reviewed today: I reviewed all new labs and imaging results over the last 24 hours.    I personally reviewed no images or EKG's today.    Physical Exam   Temp: 97.8  F (36.6  C) Temp src: Oral BP: (!) 132/92 Pulse: 83 Heart Rate: 91 Resp: 18 SpO2: 96 % O2 Device: None (Room air)    Vitals:    09/22/19 1110 09/23/19 0700 09/24/19 0600   Weight: 132.4 kg (291 lb 14.2 oz) 135.1 kg (297 lb 13.5 oz) 131.3 kg (289 lb 7.4 oz)     Vital Signs with Ranges  Temp:  [97.8  F (36.6  C)-98  F (36.7  C)] 97.8  F (36.6  C)  Pulse:  [82-96] 83  Heart Rate:  [91-98] 91  Resp:  [17-18] 18  BP: (115-149)/() 132/92  SpO2:  [94 %-96 %] 96 %  I/O last 3 completed shifts:  In: 1660 [P.O.:1560; I.V.:100]  Out: 5805 [Urine:5805]    GENERAL: Alert ,  awake and oriented. NAD. Conversational, appropriate.  Family present at the bedside,  HEENT: Normocephalic. EOMI. No icterus or injection. Nares normal.   LUNGS: Clear to auscultation except bilateral crackles in the bases. No dyspnea at rest.   HEART: Regular rate. Extremities perfused.   ABDOMEN: Soft, nontender, and nondistended. Positive bowel sounds.   EXTREMITIES: No LE edema noted.   NEUROLOGIC: Moves extremities x4 on command. No acute focal neurologic abnormalities noted.     Medications       buPROPion  75 mg Oral BID     cefTRIAXone  2 g Intravenous Q24H     escitalopram  10 mg Oral QAM     fluticasone-vilanterol  1 puff Inhalation Daily     furosemide  40 mg Intravenous BID     gabapentin  600 mg Oral TID     heparin  5,000 Units Subcutaneous Q8H     ipratropium - albuterol 0.5 mg/2.5 mg/3 mL  3 mL Nebulization 4x daily     lamoTRIgine  100 mg Oral BID     milnacipran  50 mg Oral BID     pravastatin  10 mg Oral QPM     predniSONE  40 mg Oral Daily     senna-docusate  1 tablet Per NG tube BID    Or     senna-docusate  2 tablet Oral BID       Data   Recent Labs   Lab 09/24/19  0737 09/23/19  0621 09/22/19  0618   WBC 9.5 12.4* 17.1*   HGB 10.8* 10.0* 10.6*   MCV 88 87 86    296 287    138 139   POTASSIUM 3.9 4.4 4.1   CHLORIDE 104 106 106   CO2 32 29 28   BUN 21 20 18   CR 0.92 0.78 0.75   ANIONGAP 5 3 5   QUE 8.1* 8.4* 8.5   GLC 84 140* 153*   ALBUMIN 2.8* 2.9* 2.5*   PROTTOTAL 6.1* 6.2* 6.4*   BILITOTAL 0.5 0.3 0.3   ALKPHOS 94 100 122   ALT 42 47 53   AST 30 16 18       Imaging:   No results found for this or any previous visit (from the past 24 hour(s)).

## 2019-09-24 NOTE — ADDENDUM NOTE
Addendum  created 09/24/19 0718 by Cali Espinoza APRN CRNA    Intraprocedure Event edited, Intraprocedure Staff edited

## 2019-09-24 NOTE — PLAN OF CARE
Discharge Planner PT   Patient plan for discharge: home   Current status: Pt independent with bed mobility and sit to/from stand transfer. Pt ambulated 175' with crutches and SBA. Ambulation with a hop to pattern on R LE. Pt has good UE support while ambulating. Pt performed 3 steps x2 with B crutches and CGA. Noted SOB but VSS throughout ambulation. Pt O2 sats following ambulation 93% on RA up to 99%  pulse following ambulation 135 down to 105 after 3x min rest. Pt left supine in bed  Barriers to return to prior living situation: Medical stability, impaired activity tolerance  Recommendations for discharge: Home with A for stair navigation per plan established by the PT.  Rationale for recommendations: Pt mobilizing well CGA-SBA with crutches. Limited by fatigue, SOB. Anticipate with further medical management pt will progress to meet goals prior to discharge. At this time would predict will need CGA to navigate stairs however may progress to SBA or mod I for this       Entered by: Stephanie Jimenes 09/24/2019 2:23 PM

## 2019-09-25 ENCOUNTER — APPOINTMENT (OUTPATIENT)
Dept: PHYSICAL THERAPY | Facility: CLINIC | Age: 49
DRG: 040 | End: 2019-09-25
Attending: ORTHOPAEDIC SURGERY
Payer: OTHER MISCELLANEOUS

## 2019-09-25 LAB
ALBUMIN SERPL-MCNC: 2.7 G/DL (ref 3.4–5)
ALP SERPL-CCNC: 97 U/L (ref 40–150)
ALT SERPL W P-5'-P-CCNC: 33 U/L (ref 0–70)
ANION GAP SERPL CALCULATED.3IONS-SCNC: 3 MMOL/L (ref 3–14)
AST SERPL W P-5'-P-CCNC: 9 U/L (ref 0–45)
BILIRUB SERPL-MCNC: 0.3 MG/DL (ref 0.2–1.3)
BUN SERPL-MCNC: 16 MG/DL (ref 7–30)
CALCIUM SERPL-MCNC: 8.5 MG/DL (ref 8.5–10.1)
CHLORIDE SERPL-SCNC: 106 MMOL/L (ref 94–109)
CO2 SERPL-SCNC: 34 MMOL/L (ref 20–32)
CREAT SERPL-MCNC: 0.88 MG/DL (ref 0.66–1.25)
ERYTHROCYTE [DISTWIDTH] IN BLOOD BY AUTOMATED COUNT: 13.7 % (ref 10–15)
GFR SERPL CREATININE-BSD FRML MDRD: >90 ML/MIN/{1.73_M2}
GLUCOSE SERPL-MCNC: 80 MG/DL (ref 70–99)
HCT VFR BLD AUTO: 35 % (ref 40–53)
HGB BLD-MCNC: 11.1 G/DL (ref 13.3–17.7)
MAGNESIUM SERPL-MCNC: 2.3 MG/DL (ref 1.6–2.3)
MCH RBC QN AUTO: 28.2 PG (ref 26.5–33)
MCHC RBC AUTO-ENTMCNC: 31.7 G/DL (ref 31.5–36.5)
MCV RBC AUTO: 89 FL (ref 78–100)
PHOSPHATE SERPL-MCNC: 4.2 MG/DL (ref 2.5–4.5)
PLATELET # BLD AUTO: 350 10E9/L (ref 150–450)
POTASSIUM SERPL-SCNC: 3.4 MMOL/L (ref 3.4–5.3)
PROT SERPL-MCNC: 5.9 G/DL (ref 6.8–8.8)
RBC # BLD AUTO: 3.94 10E12/L (ref 4.4–5.9)
SODIUM SERPL-SCNC: 143 MMOL/L (ref 133–144)
WBC # BLD AUTO: 9.4 10E9/L (ref 4–11)

## 2019-09-25 PROCEDURE — 94799 UNLISTED PULMONARY SVC/PX: CPT

## 2019-09-25 PROCEDURE — 85027 COMPLETE CBC AUTOMATED: CPT | Performed by: HOSPITALIST

## 2019-09-25 PROCEDURE — 12000047 ZZH R&B IMCU

## 2019-09-25 PROCEDURE — 97116 GAIT TRAINING THERAPY: CPT | Mod: GP

## 2019-09-25 PROCEDURE — 25000125 ZZHC RX 250: Performed by: INTERNAL MEDICINE

## 2019-09-25 PROCEDURE — 94640 AIRWAY INHALATION TREATMENT: CPT

## 2019-09-25 PROCEDURE — 25000132 ZZH RX MED GY IP 250 OP 250 PS 637: Performed by: HOSPITALIST

## 2019-09-25 PROCEDURE — 25000132 ZZH RX MED GY IP 250 OP 250 PS 637: Performed by: ORTHOPAEDIC SURGERY

## 2019-09-25 PROCEDURE — 12000000 ZZH R&B MED SURG/OB

## 2019-09-25 PROCEDURE — 36415 COLL VENOUS BLD VENIPUNCTURE: CPT | Performed by: HOSPITALIST

## 2019-09-25 PROCEDURE — 83735 ASSAY OF MAGNESIUM: CPT | Performed by: HOSPITALIST

## 2019-09-25 PROCEDURE — 40000275 ZZH STATISTIC RCP TIME EA 10 MIN

## 2019-09-25 PROCEDURE — 25000128 H RX IP 250 OP 636: Performed by: INTERNAL MEDICINE

## 2019-09-25 PROCEDURE — 25000131 ZZH RX MED GY IP 250 OP 636 PS 637: Performed by: INTERNAL MEDICINE

## 2019-09-25 PROCEDURE — 99207 ZZC CDG-MDM COMPONENT: MEETS MODERATE - UP CODED: CPT | Performed by: INTERNAL MEDICINE

## 2019-09-25 PROCEDURE — 25000128 H RX IP 250 OP 636: Performed by: HOSPITALIST

## 2019-09-25 PROCEDURE — 99233 SBSQ HOSP IP/OBS HIGH 50: CPT | Performed by: INTERNAL MEDICINE

## 2019-09-25 PROCEDURE — 84100 ASSAY OF PHOSPHORUS: CPT | Performed by: HOSPITALIST

## 2019-09-25 PROCEDURE — 80053 COMPREHEN METABOLIC PANEL: CPT | Performed by: HOSPITALIST

## 2019-09-25 PROCEDURE — 94640 AIRWAY INHALATION TREATMENT: CPT | Mod: 76

## 2019-09-25 RX ORDER — AMOXICILLIN 250 MG
1 CAPSULE ORAL 2 TIMES DAILY
Qty: 20 TABLET | Refills: 0 | Status: SHIPPED | OUTPATIENT
Start: 2019-09-25

## 2019-09-25 RX ORDER — FUROSEMIDE 10 MG/ML
20 INJECTION INTRAMUSCULAR; INTRAVENOUS ONCE
Status: COMPLETED | OUTPATIENT
Start: 2019-09-25 | End: 2019-09-25

## 2019-09-25 RX ORDER — FUROSEMIDE 10 MG/ML
40 INJECTION INTRAMUSCULAR; INTRAVENOUS
Status: COMPLETED | OUTPATIENT
Start: 2019-09-25 | End: 2019-09-25

## 2019-09-25 RX ORDER — CEFPODOXIME PROXETIL 200 MG/1
200 TABLET, FILM COATED ORAL 2 TIMES DAILY
Qty: 8 TABLET | Refills: 0 | Status: SHIPPED | OUTPATIENT
Start: 2019-09-25 | End: 2019-09-29

## 2019-09-25 RX ORDER — IPRATROPIUM BROMIDE AND ALBUTEROL SULFATE 2.5; .5 MG/3ML; MG/3ML
3 SOLUTION RESPIRATORY (INHALATION) EVERY 4 HOURS PRN
Status: DISCONTINUED | OUTPATIENT
Start: 2019-09-25 | End: 2019-09-26 | Stop reason: HOSPADM

## 2019-09-25 RX ORDER — GABAPENTIN 300 MG/1
600 CAPSULE ORAL 3 TIMES DAILY
Qty: 120 CAPSULE | Refills: 0 | Status: SHIPPED | OUTPATIENT
Start: 2019-09-25

## 2019-09-25 RX ADMIN — GABAPENTIN 600 MG: 300 CAPSULE ORAL at 08:47

## 2019-09-25 RX ADMIN — MILNACIPRAN HYDROCHLORIDE 50 MG: 50 TABLET, FILM COATED ORAL at 08:47

## 2019-09-25 RX ADMIN — CYCLOBENZAPRINE HYDROCHLORIDE 10 MG: 10 TABLET, FILM COATED ORAL at 15:30

## 2019-09-25 RX ADMIN — HEPARIN SODIUM 5000 UNITS: 5000 INJECTION, SOLUTION INTRAVENOUS; SUBCUTANEOUS at 21:09

## 2019-09-25 RX ADMIN — HYDROMORPHONE HYDROCHLORIDE 4 MG: 2 TABLET ORAL at 18:23

## 2019-09-25 RX ADMIN — GABAPENTIN 600 MG: 300 CAPSULE ORAL at 21:10

## 2019-09-25 RX ADMIN — FUROSEMIDE 40 MG: 10 INJECTION, SOLUTION INTRAVENOUS at 08:53

## 2019-09-25 RX ADMIN — ACETAMINOPHEN 650 MG: 325 TABLET ORAL at 12:25

## 2019-09-25 RX ADMIN — ACETAMINOPHEN 650 MG: 325 TABLET ORAL at 08:47

## 2019-09-25 RX ADMIN — HYDROMORPHONE HYDROCHLORIDE 4 MG: 2 TABLET ORAL at 12:25

## 2019-09-25 RX ADMIN — MILNACIPRAN HYDROCHLORIDE 50 MG: 50 TABLET, FILM COATED ORAL at 21:10

## 2019-09-25 RX ADMIN — HEPARIN SODIUM 5000 UNITS: 5000 INJECTION, SOLUTION INTRAVENOUS; SUBCUTANEOUS at 13:03

## 2019-09-25 RX ADMIN — HYDROMORPHONE HYDROCHLORIDE 4 MG: 2 TABLET ORAL at 06:15

## 2019-09-25 RX ADMIN — ACETAMINOPHEN 650 MG: 325 TABLET ORAL at 16:42

## 2019-09-25 RX ADMIN — FLUTICASONE FUROATE AND VILANTEROL TRIFENATATE 1 PUFF: 100; 25 POWDER RESPIRATORY (INHALATION) at 10:59

## 2019-09-25 RX ADMIN — FUROSEMIDE 20 MG: 10 INJECTION, SOLUTION INTRAVENOUS at 18:20

## 2019-09-25 RX ADMIN — LAMOTRIGINE 100 MG: 100 TABLET ORAL at 08:47

## 2019-09-25 RX ADMIN — HYDROMORPHONE HYDROCHLORIDE 4 MG: 2 TABLET ORAL at 15:30

## 2019-09-25 RX ADMIN — LAMOTRIGINE 100 MG: 100 TABLET ORAL at 21:10

## 2019-09-25 RX ADMIN — ACETAMINOPHEN 650 MG: 325 TABLET ORAL at 21:17

## 2019-09-25 RX ADMIN — ESCITALOPRAM 10 MG: 5 TABLET, FILM COATED ORAL at 08:47

## 2019-09-25 RX ADMIN — HEPARIN SODIUM 5000 UNITS: 5000 INJECTION, SOLUTION INTRAVENOUS; SUBCUTANEOUS at 06:15

## 2019-09-25 RX ADMIN — CYCLOBENZAPRINE HYDROCHLORIDE 10 MG: 10 TABLET, FILM COATED ORAL at 06:15

## 2019-09-25 RX ADMIN — PREDNISONE 40 MG: 20 TABLET ORAL at 08:47

## 2019-09-25 RX ADMIN — HYDROMORPHONE HYDROCHLORIDE 4 MG: 2 TABLET ORAL at 01:59

## 2019-09-25 RX ADMIN — BUPROPION HYDROCHLORIDE 75 MG: 75 TABLET, FILM COATED ORAL at 21:10

## 2019-09-25 RX ADMIN — IPRATROPIUM BROMIDE AND ALBUTEROL SULFATE 3 ML: .5; 3 SOLUTION RESPIRATORY (INHALATION) at 07:07

## 2019-09-25 RX ADMIN — GABAPENTIN 600 MG: 300 CAPSULE ORAL at 15:30

## 2019-09-25 RX ADMIN — CEFTRIAXONE SODIUM 2 G: 2 INJECTION, POWDER, FOR SOLUTION INTRAMUSCULAR; INTRAVENOUS at 12:25

## 2019-09-25 RX ADMIN — PRAVASTATIN SODIUM 10 MG: 10 TABLET ORAL at 21:10

## 2019-09-25 RX ADMIN — FUROSEMIDE 40 MG: 10 INJECTION, SOLUTION INTRAVENOUS at 15:31

## 2019-09-25 RX ADMIN — BUPROPION HYDROCHLORIDE 75 MG: 75 TABLET, FILM COATED ORAL at 08:47

## 2019-09-25 RX ADMIN — SENNOSIDES AND DOCUSATE SODIUM 2 TABLET: 8.6; 5 TABLET ORAL at 08:52

## 2019-09-25 RX ADMIN — HYDROMORPHONE HYDROCHLORIDE 4 MG: 2 TABLET ORAL at 21:17

## 2019-09-25 RX ADMIN — ACETAMINOPHEN 650 MG: 325 TABLET ORAL at 03:55

## 2019-09-25 RX ADMIN — HYDROMORPHONE HYDROCHLORIDE 4 MG: 2 TABLET ORAL at 09:22

## 2019-09-25 RX ADMIN — IPRATROPIUM BROMIDE AND ALBUTEROL SULFATE 3 ML: .5; 3 SOLUTION RESPIRATORY (INHALATION) at 11:42

## 2019-09-25 ASSESSMENT — ACTIVITIES OF DAILY LIVING (ADL)
ADLS_ACUITY_SCORE: 14

## 2019-09-25 ASSESSMENT — MIFFLIN-ST. JEOR
SCORE: 2192
SCORE: 2228

## 2019-09-25 NOTE — PROGRESS NOTES
[facility]    HOSPITALIST PROGRESS NOTE :   --------------------------------------------------    Date of Admission:  9/12/2019    Cumulative Summary: Juancho Mohan is a 48 year old male with past medical history significant for sarcoidosis, sleep apnea, GERD, migraine, essential hypertension who was admitted on September 12 for elective left below-knee amputation by orthopedic surgery for chronic pain after Worker's Comp. injury many years ago.  He was progressing well after surgery, was transitioned to p.o. pain regimen and was planning to be discharged home with home health care.  Patient had a sudden deterioration and on September 16, RRT was called and he was found to be mottled with cool extremities, lethargic with fever, hypertension, tachycardic and hypoxic on supplemental oxygen.  He was complaining of mild dizziness, minimal urine output was also noticed, he was placed on BiPAP and was transferred to ICU for close monitoring and was a started on broad-spectrum antibiotics vancomycin and Zosyn considering sepsis protocol along with IV fluid resuscitation.  CT scan of the head and CT scan of the chest for PE to rule out was negative.  He was extubated on September 17 and was able to weaned off levo fed.  2 out of 2 blood cultures grew Streptococcus mitis from September 16th but since then his blood cultures have been negative.    Assessment & Plan     Active Problems:  Status post elective left below-knee amputation due to chronic pain on September 12 th:  Septic shock, resolved:  Metabolic encephalopathy secondary to sepsis with gram-positive bacteremia, resolved:  Strep bacteremia, 2 out of 2 blood cultures positive with a Streptococcus mitis:    Patient initially did very well after his initial surgery, then had RRT on September 16 requiring initiation of sepsis protocol for likely infection.  CT scan of the chest showed bilateral lower lobe pneumonia, blood cultures were positive  and then he started to grow Streptococcus mittens 2 out of 2.  He was initially encephalopathic, very unresponsive even after IV fluid bolus but has much improved than.  He was able to come off pressors and lactate was normalized.  His WBC count of normal and normal range.  Initially his procalcitonin was as high as 93.5.  Patient was seen and examined this morning, feeling better , we discussed about getting his standing weight as patient had good urine out put but his bodyweight is about the same .  Standing weight is down to 280 lbs.  Baseline weight is around 269 lbs    --Continue to monitor patient closely on telemetry.  --Patient has so far received 9 days of IV antibiotics, continue patient on ceftriaxone 2 g IV 24 hours, plan to discharge him on oral antibiotics to finish 14 day course due to bacteremia   -- Discontinued IV steroids and started patient on oral prednisone , today is day 3 of 40 mg of oral prednisone , will start him on 30 mg from tomorrow  -- Give patient lasix 40 mg IV BID for 2 doses today , will give an additional dose of 20 mg of Iv Lasix at 18;00  --Strict intake and output.  --Daily weight.  --Continue patient on DuoNeb 4 times daily, will change it to PRN  --Continue flutter valve.  -- Continue incentive spirometry.  --Patient has normal lactic acid and his leukocyte counts have downward trend this morning, most likely upward trend was secondary to highdose of steroid we will continue to monitor closely.    Acute hypoxic respiratory failure requiring intubation: Resolving now on room air although still getting short of breath easily, and his oxygen saturation drops with exertion.  On September 16 RRT was called was found to have oxygen saturation of 82 to 83% on 6 L of oxygen, was placed on BiPAP but became unresponsive and required intubation, patient was able to be extubated on September 17 successfully.  Bilateral lower lobe pneumonia  Possible sarcoidosis/asthma  exacerbation:    --As above continue patient on IV antibiotics.  --Aggressive pulmonary toilet with incentive spirometry and flutter valve.  --There is also concern for significant fluid overload, BNP is increased and weight has been up significantly since his admission.    -- plan for diuresis as above   --Echocardiogram was done during this admission which showed ejection fraction of 65 to 70%, RV function is borderline reduced with preserved motion at the base but relative hypokinesis of the mid and distal free wall.  --Continue Breo Ellipta.  --Patient will need outpatient follow-up with pulmonary after discharge.  --I did have discussion with patient regarding use of the steroids as an outpatient. According to patient he has only used 4 days of oral steroids after his chemotherapy.   --oral prednisone 40 mg p.o. daily for 3 days , then will switch to 30 mg for 3 days , then 20 mg for 3 days and then 10 mg for 3 days     Acute kidney injury: Now resolved, baseline creatinine is near 0.7-0.9.  Worsened to 1.7 on the day of RRT due to shock and initially he also had poor urine output now it is improved after IV fluid resuscitation, creatinine is back to normal with good oral intake patient has been off IV fluids since September 18.    --Continue to monitor closely, hold nephrotoxic agents.  --Will hold lisinopril and hydrochlorothiazide.  --We will give patient couple of doses of Lasix today.  -- plan to resume hydrochlorothiazide and lisinopril on discharge     Mild anemia: Hemoglobin is a stable now  --Continue to monitor from time to time.    Essential hypertension; his home medications include lisinopril/hydrochlorothiazide 20/12.5 mg p.o.twice daily   Hyperlipidemia:  --Continue to hold hydrochlorothiazide/lisinopril at this point.  --As above the starting patient on Lasix 40 mg IV twice daily for now.  --Continue patient on pravastatin 10 mg p.o. daily.    Adjustment disorder with anxiety and  depression  --PTA medications include Wellbutrin, Lamictal and Lexapro, continue those medications at the current dose inpatient.    Malignant melanoma of right thigh  --Maintained on Keytruda as an outpatient.    History of migraines  --Currently no issues with migraines, uses as needed Imitrex at home.    Constipation  --Continue current bowel regimen.    Diet: Diet  Regular Diet Adult    Fitzgerald Catheter: not present  DVT Prophylaxis: Heparin SQ  Code Status: Full Code    The patient's care was discussed with the Bedside Nurse, Patient and Patient's Family.    Disposition Plan   Expected discharge: tentative discharge on Thursday morning if continues to make clinical improvement and is done with diuresis   Final disposition per orthopedic surgery, will continue to follow closely     Marcy Lopez MD, FACP  Text Page (7am - 6pm)    ----------------------------------------------------------------------------------------------------------------------      Interval History   Patient was seen and examined , family also present in the room.  Patient told me that he is starting to feel better, weight is coming down , we checked his standing weight which is improving , he still gets SOB on exertion although he feels he is making improvement on daily basis.    -Data reviewed today: I reviewed all new labs and imaging results over the last 24 hours.    I personally reviewed no images or EKG's today.    Physical Exam   Temp: 97.9  F (36.6  C) Temp src: Oral BP: 124/83 Pulse: 71 Heart Rate: 73 Resp: 18 SpO2: 96 % O2 Device: None (Room air)    Vitals:    09/23/19 0700 09/24/19 0600 09/25/19 0558   Weight: 135.1 kg (297 lb 13.5 oz) 131.3 kg (289 lb 7.4 oz) 131 kg (288 lb 12.8 oz)     Vital Signs with Ranges  Temp:  [97.3  F (36.3  C)-98.1  F (36.7  C)] 97.9  F (36.6  C)  Pulse:  [] 71  Heart Rate:  [] 73  Resp:  [16-18] 18  BP: (122-140)/(72-97) 124/83  SpO2:  [91 %-96 %] 96 %  I/O last 3 completed shifts:  In: 360  [P.O.:360]  Out: 6900 [Urine:6900]    GENERAL: Alert , awake and oriented. NAD. Conversational, appropriate.  Family present at the bedside,  HEENT: Normocephalic. EOMI. No icterus or injection. Nares normal.   LUNGS: Clear to auscultation except bilateral crackles in the bases. No dyspnea at rest.   HEART: Regular rate. Extremities perfused.   ABDOMEN: Soft, nontender, and nondistended. Positive bowel sounds.   EXTREMITIES: No LE edema noted.   NEUROLOGIC: Moves extremities x4 on command. No acute focal neurologic abnormalities noted.     Medications       buPROPion  75 mg Oral BID     cefTRIAXone  2 g Intravenous Q24H     escitalopram  10 mg Oral QAM     fluticasone-vilanterol  1 puff Inhalation Daily     furosemide  40 mg Intravenous BID     gabapentin  600 mg Oral TID     heparin  5,000 Units Subcutaneous Q8H     ipratropium - albuterol 0.5 mg/2.5 mg/3 mL  3 mL Nebulization 4x daily     lamoTRIgine  100 mg Oral BID     milnacipran  50 mg Oral BID     pravastatin  10 mg Oral QPM     predniSONE  40 mg Oral Daily     senna-docusate  1 tablet Per NG tube BID    Or     senna-docusate  2 tablet Oral BID       Data   Recent Labs   Lab 09/25/19  0713 09/24/19  0737 09/23/19  0621   WBC 9.4 9.5 12.4*   HGB 11.1* 10.8* 10.0*   MCV 89 88 87    323 296    141 138   POTASSIUM 3.4 3.9 4.4   CHLORIDE 106 104 106   CO2 34* 32 29   BUN 16 21 20   CR 0.88 0.92 0.78   ANIONGAP 3 5 3   QUE 8.5 8.1* 8.4*   GLC 80 84 140*   ALBUMIN 2.7* 2.8* 2.9*   PROTTOTAL 5.9* 6.1* 6.2*   BILITOTAL 0.3 0.5 0.3   ALKPHOS 97 94 100   ALT 33 42 47   AST 9 30 16       Imaging:   No results found for this or any previous visit (from the past 24 hour(s)).

## 2019-09-25 NOTE — PLAN OF CARE
A&O, VSS. Lung sounds diminished w/ occasional expiratory wheezes.  Bowel sounds active +flatus.  Adequate urine output. Left BKA CDI. Pt. Showered this shift. Ambulates assist x1 w/ crutches and gait belt. Tolerating regular diet.  Pain controlled by PRN dilaudid, tylenol and flexeril. Standing wt needed tomorrow AM. Plan for discharge tomorrow.

## 2019-09-25 NOTE — PLAN OF CARE
A/O x4. Tolerating regular diet. Up Ax1 w/ GB and crutches. BEY, lungs dim. Tele SR. Tachy at times 90s-115. Saline locked. Taking PO Dilaudid, Tylenol, and Flexeril for pain w/ IV Dilaudid given x1 for breakthrough. CMS intact. Reports BM this morning. LLE dressing CDI.

## 2019-09-25 NOTE — PLAN OF CARE
A&O, VSS. Lung sounds diminished w/ occasional expiratory wheezes.  Bowel sounds active +flatus.  Adequate urine output. Left BKA CDI. Pt. Showered this shift. Ambulates assist x1 w/ crutches and gait belt. Tolerating regular diet.  Pain controlled by PRN dilaudid, tylenol and flexeril. Plan for discharge tomorrow.

## 2019-09-25 NOTE — PLAN OF CARE
A&O x4. VSS on RA. Tele SR w/ BBB. Lungs diminished. BS+, BM-, flatus+. BKA covered w/ boot. Tolerating diet. Denies N/V. Voiding adequately. Dilaudid, tylenol and flexeril for pain. Up w/ 1 GB and crutches.

## 2019-09-25 NOTE — PLAN OF CARE
Discharge Planner PT   Patient plan for discharge: home   Current status: Pt independent with bed mobility. Pt needs A to get L LE strap. Pt ambulated 350' with crutches and a hop to pattern with R LE. Pt took 3 standing rest breaks due to increased fatigue and SOB, pt declined sitting. Pt performed 3 steps x2 with B crutches and CGA x2 for safety. Pt able to control descent on stairs more independetly.   Barriers to return to prior living situation: Medical stability, impaired activity tolerance  Recommendations for discharge: Home with A for stair navigation per plan established by the PT.  Rationale for recommendations: Pt mobilizing well CGA-SBA with crutches. Limited by fatigue, SOB. Anticipate with further medical management pt will progress to meet goals prior to discharge. At this time would predict will need CGA to navigate stairs however may progress to SBA or mod I for this       Entered by: Stephanie Jimenes 09/25/2019 11:36 AM

## 2019-09-26 VITALS
DIASTOLIC BLOOD PRESSURE: 97 MMHG | RESPIRATION RATE: 16 BRPM | OXYGEN SATURATION: 99 % | HEART RATE: 104 BPM | BODY MASS INDEX: 37.18 KG/M2 | TEMPERATURE: 98 F | SYSTOLIC BLOOD PRESSURE: 137 MMHG | WEIGHT: 274.47 LBS | HEIGHT: 72 IN

## 2019-09-26 LAB
ALBUMIN SERPL-MCNC: 2.9 G/DL (ref 3.4–5)
ALP SERPL-CCNC: 112 U/L (ref 40–150)
ALT SERPL W P-5'-P-CCNC: 35 U/L (ref 0–70)
ANION GAP SERPL CALCULATED.3IONS-SCNC: 5 MMOL/L (ref 3–14)
AST SERPL W P-5'-P-CCNC: 11 U/L (ref 0–45)
BILIRUB SERPL-MCNC: 0.3 MG/DL (ref 0.2–1.3)
BUN SERPL-MCNC: 18 MG/DL (ref 7–30)
CALCIUM SERPL-MCNC: 8.5 MG/DL (ref 8.5–10.1)
CHLORIDE SERPL-SCNC: 102 MMOL/L (ref 94–109)
CO2 SERPL-SCNC: 34 MMOL/L (ref 20–32)
CREAT SERPL-MCNC: 0.84 MG/DL (ref 0.66–1.25)
ERYTHROCYTE [DISTWIDTH] IN BLOOD BY AUTOMATED COUNT: 13.9 % (ref 10–15)
GFR SERPL CREATININE-BSD FRML MDRD: >90 ML/MIN/{1.73_M2}
GLUCOSE SERPL-MCNC: 94 MG/DL (ref 70–99)
HCT VFR BLD AUTO: 35.9 % (ref 40–53)
HGB BLD-MCNC: 11.7 G/DL (ref 13.3–17.7)
MAGNESIUM SERPL-MCNC: 2.3 MG/DL (ref 1.6–2.3)
MCH RBC QN AUTO: 29 PG (ref 26.5–33)
MCHC RBC AUTO-ENTMCNC: 32.6 G/DL (ref 31.5–36.5)
MCV RBC AUTO: 89 FL (ref 78–100)
PHOSPHATE SERPL-MCNC: 3.8 MG/DL (ref 2.5–4.5)
PLATELET # BLD AUTO: 359 10E9/L (ref 150–450)
POTASSIUM SERPL-SCNC: 3.2 MMOL/L (ref 3.4–5.3)
PROT SERPL-MCNC: 6.4 G/DL (ref 6.8–8.8)
RBC # BLD AUTO: 4.03 10E12/L (ref 4.4–5.9)
SODIUM SERPL-SCNC: 141 MMOL/L (ref 133–144)
WBC # BLD AUTO: 11.3 10E9/L (ref 4–11)

## 2019-09-26 PROCEDURE — 80053 COMPREHEN METABOLIC PANEL: CPT | Performed by: HOSPITALIST

## 2019-09-26 PROCEDURE — 25000131 ZZH RX MED GY IP 250 OP 636 PS 637: Performed by: INTERNAL MEDICINE

## 2019-09-26 PROCEDURE — 25000132 ZZH RX MED GY IP 250 OP 250 PS 637: Performed by: HOSPITALIST

## 2019-09-26 PROCEDURE — 36415 COLL VENOUS BLD VENIPUNCTURE: CPT | Performed by: HOSPITALIST

## 2019-09-26 PROCEDURE — 25000132 ZZH RX MED GY IP 250 OP 250 PS 637: Performed by: INTERNAL MEDICINE

## 2019-09-26 PROCEDURE — 85027 COMPLETE CBC AUTOMATED: CPT | Performed by: HOSPITALIST

## 2019-09-26 PROCEDURE — 25000128 H RX IP 250 OP 636: Performed by: HOSPITALIST

## 2019-09-26 PROCEDURE — 84100 ASSAY OF PHOSPHORUS: CPT | Performed by: HOSPITALIST

## 2019-09-26 PROCEDURE — 99232 SBSQ HOSP IP/OBS MODERATE 35: CPT | Performed by: INTERNAL MEDICINE

## 2019-09-26 PROCEDURE — 83735 ASSAY OF MAGNESIUM: CPT | Performed by: HOSPITALIST

## 2019-09-26 PROCEDURE — 25000132 ZZH RX MED GY IP 250 OP 250 PS 637: Performed by: ORTHOPAEDIC SURGERY

## 2019-09-26 RX ORDER — PREDNISONE 10 MG/1
TABLET ORAL
Qty: 18 TABLET | Refills: 0 | Status: SHIPPED | OUTPATIENT
Start: 2019-09-26

## 2019-09-26 RX ADMIN — HYDROMORPHONE HYDROCHLORIDE 4 MG: 2 TABLET ORAL at 05:13

## 2019-09-26 RX ADMIN — PREDNISONE 30 MG: 20 TABLET ORAL at 08:34

## 2019-09-26 RX ADMIN — SENNOSIDES AND DOCUSATE SODIUM 2 TABLET: 8.6; 5 TABLET ORAL at 08:33

## 2019-09-26 RX ADMIN — HYDROMORPHONE HYDROCHLORIDE 4 MG: 2 TABLET ORAL at 00:33

## 2019-09-26 RX ADMIN — POTASSIUM CHLORIDE 40 MEQ: 1500 TABLET, EXTENDED RELEASE ORAL at 08:34

## 2019-09-26 RX ADMIN — CYCLOBENZAPRINE HYDROCHLORIDE 10 MG: 10 TABLET, FILM COATED ORAL at 10:21

## 2019-09-26 RX ADMIN — LAMOTRIGINE 100 MG: 100 TABLET ORAL at 08:34

## 2019-09-26 RX ADMIN — ACETAMINOPHEN 650 MG: 325 TABLET ORAL at 08:33

## 2019-09-26 RX ADMIN — POTASSIUM CHLORIDE 20 MEQ: 1500 TABLET, EXTENDED RELEASE ORAL at 10:21

## 2019-09-26 RX ADMIN — HEPARIN SODIUM 5000 UNITS: 5000 INJECTION, SOLUTION INTRAVENOUS; SUBCUTANEOUS at 05:13

## 2019-09-26 RX ADMIN — ESCITALOPRAM 10 MG: 5 TABLET, FILM COATED ORAL at 08:34

## 2019-09-26 RX ADMIN — HYDROMORPHONE HYDROCHLORIDE 4 MG: 2 TABLET ORAL at 08:33

## 2019-09-26 RX ADMIN — HYDROMORPHONE HYDROCHLORIDE 4 MG: 2 TABLET ORAL at 11:22

## 2019-09-26 RX ADMIN — MILNACIPRAN HYDROCHLORIDE 50 MG: 50 TABLET, FILM COATED ORAL at 08:34

## 2019-09-26 RX ADMIN — BUPROPION HYDROCHLORIDE 75 MG: 75 TABLET, FILM COATED ORAL at 08:34

## 2019-09-26 RX ADMIN — GABAPENTIN 600 MG: 300 CAPSULE ORAL at 08:34

## 2019-09-26 ASSESSMENT — ACTIVITIES OF DAILY LIVING (ADL)
ADLS_ACUITY_SCORE: 15
ADLS_ACUITY_SCORE: 14

## 2019-09-26 ASSESSMENT — MIFFLIN-ST. JEOR: SCORE: 2163

## 2019-09-26 NOTE — PROGRESS NOTES
CLINICAL NUTRITION SERVICES - REASSESSMENT NOTE      Future/Additional Recommendations:  Regular diet  Continue protein TID w meals   Malnutrition: 9/19  Patient does not meet two of the following criteria necessary for diagnosing malnutrition: significant weight loss, reduced intake, subcutaneous fat loss, muscle loss or fluid retention.        EVALUATION OF PROGRESS TOWARD GOALS   Diet:  Regular    Intake/Tolerance:   -Eating % of meals. Has been ordering regularly  -Last BM noted 9/23 x1  -Meds reviewed      NEW FINDINGS:   Pt discharging today 9/26 to home.      Previous Nutrition Diagnosis:   No nutrition diagnosis at this time.  Evaluation: No change        CURRENT NUTRITION DIAGNOSIS  No nutrition diagnosis identified at this time    INTERVENTIONS  Recommendations / Nutrition Prescription  Regular diet  Continue protein TID w meals    Implementation  Composition of Meals and Snacks and General/healthful diet  **Encouraged pt to continue eating >/=70% of meals and continue protein intake.    Goals  Pt to continue incorporating protein at each meal for wound healing.      MONITORING AND EVALUATION:  Progress towards goals will be monitored and evaluated per protocol and Practice Guidelines

## 2019-09-26 NOTE — DISCHARGE SUMMARY
[facility] Discharge Summary        Juancho Mohan MRN# [id number]   YOB: 1970 Age: 48 year old     Date of Admission:  9/12/2019  Date of Discharge:  9/26/2019  Admitting Physician:  Amauri Mccoy MD  Discharge Physician: Amauri Mccoy,*  Discharging Service: Orthopaedics/Foot and Ankle Surgery     Primary Provider: Ayde, Malinda Kaur  Primary Care Physician Phone Number: [phone]         Discharge Diagnoses/Problem Oriented Hospital Course (Providers):    Juancho Mohan was admitted on 9/12/2019 by Amauri Mccoy MD and I would refer you to their history and physical.  The following problems were addressed during his hospitalization:  S/P Left BKA, complicated by sepsis and exacerbation of sarcoidosis         Code Status:      Full Code        Brief Hospital Stay Summary Sent Home With Patient in AVS:        Reason for your hospital stay      L BKA.                  Important Results:      Negative blood cultures since 09/17/2019.         Pending Results:        Unresulted Labs Ordered in the Past 30 Days of this Admission     Date and Time Order Name Status Description    9/16/2019 1427 Blood culture Preliminary             Discharge Instructions and Follow-Up:      Follow-up Appointments     Follow-up and recommended labs and tests       The patient will follow-up in clinic with Dr. Mccoy/Cali Tate PA-C   ([facility]; [phone]) in 3 weeks for wound check and   suture removal.               Discharge Disposition:      Discharged to home         Discharge Medications:        Current Discharge Medication List      START taking these medications    Details   aspirin (ASA) 325 MG tablet Take 1 tablet (325 mg) by mouth daily  Qty: 42 tablet, Refills: 0    Comments: For DVT prophylaxis.  Associated Diagnoses: Below knee amputation status, left      cefpodoxime (VANTIN) 200 MG tablet Take 1 tablet (200 mg) by mouth 2  times daily for 4 days  Qty: 8 tablet, Refills: 0    Associated Diagnoses: Below knee amputation status, left; Infection      cyclobenzaprine (FLEXERIL) 10 MG tablet Take 1 tablet (10 mg) by mouth 3 times daily as needed for muscle spasms  Qty: 30 tablet, Refills: 0    Associated Diagnoses: Below knee amputation status, left      diphenhydrAMINE (BENADRYL) 25 MG capsule Take 1 capsule (25 mg) by mouth every 6 hours as needed for itching or sleep    Associated Diagnoses: Below knee amputation status, left      gabapentin (NEURONTIN) 300 MG capsule Take 2 capsules (600 mg) by mouth 3 times daily  Qty: 120 capsule, Refills: 0    Comments: For neuropathic pain.  Associated Diagnoses: Below knee amputation status, left      HYDROmorphone (DILAUDID) 2 MG tablet Take 1-2 tablets (2-4 mg) by mouth every 3 hours as needed for moderate to severe pain  Qty: 30 tablet, Refills: 0    Associated Diagnoses: Below knee amputation status, left      !! order for DME Equipment being ordered: Bath Seat ()   Treatment Diagnosis: Left BKA  Qty: 1 Units, Refills: 0    Associated Diagnoses: Below knee amputation status, left      !! order for DME Equipment being ordered: Other: Permanent grab bars for home shower  Treatment Diagnosis: L BKA  Qty: 3 Units, Refills: 0    Associated Diagnoses: Below knee amputation status, left      !! order for DME Equipment being ordered: Other: Roll-a-bout times 8 weeks.   Treatment Diagnosis: Impaired gait due to new BKA  Qty: 1 each, Refills: 0    Associated Diagnoses: Below knee amputation status, left      polyethylene glycol (MIRALAX/GLYCOLAX) packet Take 17 g by mouth daily as needed for constipation    Associated Diagnoses: Below knee amputation status, left      senna-docusate (SENOKOT-S/PERICOLACE) 8.6-50 MG tablet Take 1 tablet by mouth 2 times daily  Qty: 20 tablet, Refills: 0    Comments: While on narcotics  Associated Diagnoses: Below knee amputation status, left      sennosides (SENOKOT)  8.6 MG tablet Take 2 tablets by mouth 2 times daily as needed for constipation  Qty: 30 tablet, Refills: 0    Associated Diagnoses: Below knee amputation status, left       !! - Potential duplicate medications found. Please discuss with provider.      CONTINUE these medications which have NOT CHANGED    Details   acetaminophen (TYLENOL) 325 MG tablet Take 2 tablets (650 mg) by mouth every 4 hours as needed  Qty: 100 tablet    Associated Diagnoses: Malunion of joint fusion (H)      albuterol (PROAIR HFA/PROVENTIL HFA/VENTOLIN HFA) 108 (90 Base) MCG/ACT inhaler Inhale 2 puffs into the lungs 4 times daily as needed for shortness of breath / dyspnea or wheezing    Comments: Pharmacy may dispense brand covered by insurance (Proair, or proventil or ventolin or generic albuterol inhaler)      buPROPion (WELLBUTRIN) 75 MG tablet Take 75 mg by mouth 2 times daily      escitalopram (LEXAPRO) 10 MG tablet Take 10 mg by mouth every morning      fluticasone-vilanterol (BREO ELLIPTA) 100-25 MCG/INH inhaler Inhale 1 puff into the lungs daily      IMITREX 20 MG/ACT nasal spray SPRAY 1 SPRAY IN NOSTRIL AS NEEDED FOR MIGRAINE (MAY REPEAT AFTER 2 HRS IF NEEDED, MAX 2 SPRAYS IN 24 HRS.).  Qty: 1 each, Refills: 0    Associated Diagnoses: Migraine, unspecified, without mention of intractable migraine without mention of status migrainosus      lamoTRIgine (LAMICTAL) 200 MG tablet Take 100 mg by mouth 2 times daily       lisinopril-hydrochlorothiazide (PRINZIDE/ZESTORETIC) 20-12.5 MG tablet Take 1 tablet by mouth 2 times daily      milnacipran (SAVELLA) 50 MG TABS Take 50 mg by mouth 2 times daily.      ondansetron (ZOFRAN-ODT) 8 MG ODT tab Take 8 mg by mouth every 8 hours as needed for nausea      pravastatin (PRAVACHOL) 10 MG tablet Take 10 mg by mouth every evening       vitamin C (ASCORBIC ACID) 1000 MG TABS Take 1,000 mg by mouth daily      !! ORDER FOR DME Equipment being ordered: Walker Wheels (), Walker () and Crutches  ()  Treatment Diagnosis: Decreased functional mobility  Qty: 1 each, Refills: 0    Associated Diagnoses: Malunion of joint fusion (H)      !! ORDER FOR DME Equipment being ordered: Walker Wheels (), Walker () and Crutches ()  Treatment Diagnosis: decreased functional mobility  Qty: 1 each, Refills: 0       !! - Potential duplicate medications found. Please discuss with provider.            Allergies:         Allergies   Allergen Reactions     Lansoprazole      prevacid-arm rash     Steri Strips      blisters           Consultations This Hospital Stay:      Consultation during this admission received from internal medicine and pulmonary medicine         Condition and Physical on Discharge:      Discharge condition: Stable   Vitals: Blood pressure (!) 137/97, pulse 104, temperature 98  F (36.7  C), temperature source Oral, resp. rate 16, height 1.829 m (6'), weight 124.5 kg (274 lb 7.6 oz), SpO2 99 %.  274 lbs 7.56 oz      Constitutional: A&Ox3, in no acute distress.    Musculoskeletal: S/P L BKA, in a FloTech Brace with knee in extension.   Neurovascular: Sensation remains grossly intact. Popliteal pulse is intact.    Skin: No cyanosis, erythema, drainage or concern for infection. Sutures are intact.   Other: Patient denies any focal upper calf or thigh tenderness.          Discharge Time:      N/A        Image Results From This Hospital Stay (For Non-EPIC Providers):        Results for orders placed or performed during the hospital encounter of 09/12/19   XR Knee Port Left 1/2 Views    Narrative    LEFT KNEE PORTABLE ONE TO TWO VIEWS  9/12/2019 12:30 PM     HISTORY: Amputation of left lower extremity.    COMPARISON: None.      Impression    IMPRESSION: Intraoperative AP view shows below-the-knee amputation.  Fixation hardware is seen in the remaining proximal tibia.    MARY MONTOYA MD   CT Chest (PE) Abdomen Pelvis w Contrast    Narrative    CT CHEST PE ABDOMEN PELVIS W CONTRAST   9/17/2019  5:14 AM      HISTORY:  Shortness of breath; sepsis.    TECHNIQUE:  CT chest, abdomen and pelvis with intravenous contrast.  Radiation dose for this scan was reduced using automated exposure  control, adjustment of the mA and/or kV according to patient size, or  iterative reconstruction technique.  83mL Isouve-370      COMPARISON:  8/10/2012.    FINDINGS:    Chest:  Evaluation of the pulmonary arterial system is somewhat  limited by artifact of the patient's arms being at his sides. No  convincing pulmonary emboli. No aortic aneurysm or dissection. The  heart is at the upper limits of normal in size. No mediastinal, hilar  or axillary lymph node enlargement. ET tube in the mid trachea. Right  PICC in place. There are bilateral lower lobe consolidations,  atelectasis versus pneumonia. There are also consolidations  dependently within the lingula and right middle lobe. No pneumothorax  or pleural effusion.    Abdomen: The liver, spleen, gallbladder, pancreas, adrenal glands and  kidneys are normal in appearance. There is no abdominal or pelvic  lymph node enlargement.    Pelvis: There is a catheter in the urinary bladder. No bowel  obstruction. Moderate amount of stool in the colon. No free  intraperitoneal gas or fluid. Postoperative changes in the lumbosacral  spine.      Impression    IMPRESSION:  1. There is no evidence of pulmonary embolus on this somewhat limited  study. No aortic aneurysm or dissection.  2. Large bilateral lower lobe consolidations, atelectasis versus  pneumonia.  3. No acute abdominal or pelvic abnormality.    ROSALINE DARLING MD   CT Head w/o Contrast    Narrative    EXAM: CT HEAD W/O CONTRAST  LOCATION: [facility]  DATE/TIME: 9/17/2019 4:51 AM    INDICATION: Altered level of consciousness (LOC), unexplained, altered mental status.  COMPARISON: None.  TECHNIQUE: Routine without IV contrast. Multiplanar reformats. Dose reduction techniques were used.    FINDINGS: Technically  suboptimal secondary to streak artifact generated by external materials.  INTRACRANIAL CONTENTS: No intracranial hemorrhage, extraaxial collection, or mass effect.  No CT evidence of acute infarct. Normal parenchymal attenuation. Normal ventricles and sulci.     VISUALIZED ORBITS/SINUSES/MASTOIDS: No intraorbital abnormality. Pneumatized fluid in the right sphenoid locule. No middle ear or mastoid effusion.    BONES/SOFT TISSUES: No acute abnormality.      Impression    IMPRESSION:  1.  No CT evidence of acute intracranial hemorrhage, mass or recent infarct.  2.  Pneumatized fluid in the right sphenoid locule. Correlate for acute sinusitis.           US Lower Extremity Venous Duplex Bilateral    Narrative    BILATERAL LEG VENOUS ULTRASOUND 9/16/2019 4:20 PM    HISTORY: Recent left below the knee amputation. Respiratory distress.     TECHNIQUE; Venous Doppler ultrasound with color flow and spectral  Doppler with waveform analysis performed. Compression and augmentation  performed.    COMPARISON none       Impression    IMPRESSION : No evidence for deep vein thrombus in right or left lower  extremity. Below knee amputation of the left leg. No evidence for  greater saphenous vein thrombus.    DENILSON NICHOLSON MD   XR Chest Port 1 View    Narrative    CHEST ONE VIEW PORTABLE   9/16/2019 5:00 PM     HISTORY: New intubation for increased lethargy    COMPARISON: 6/6/2012      Impression    IMPRESSION: Tip of the ET tube is just in the proximal right mainstem  bronchus and ET tube needs to be pulled back. There is left lower lung  opacity which could be atelectasis or infiltrate. Mild patchy opacity  right upper and lower lung. There is a coiled tube projecting over the  right side of the neck which is indeterminate. Enlarged cardiac  silhouette. Position of the ET tube and need to pull back the ET tube  was discussed by myself with patient's ICU nurse Gayle at 6:01 PM on  9/16/2019.    DENILSON NICHOLSON MD   XR Abdomen Port  1 View    Narrative    XR ABDOMEN PORT 1 VW   9/17/2019 12:25 AM     HISTORY: NG tube placement.    COMPARISON: None.       Impression    IMPRESSION: Nasoenteric feeding tube appears to be looped in the  distal esophagus and the tip extends into the right lung base. This  has since been removed on a follow-up chest x-ray.    ROSALINE DARLING MD   XR Chest Port 1 View    Narrative    XR CHEST PORT 1 VW   9/17/2019 12:35 AM     HISTORY: Inadvertent placement of NG tube.    COMPARISON: 9/16/2019.    FINDINGS: Upright portable chest. ET tube approximately 4 cm above the  barbara. Right PICC is in place with tip at the junction of right  atrium and SVC. No NG tube is seen. No pneumothorax. The heart size is  normal. There are bibasilar infiltrates similar to the previous exam.  Probable left pleural effusion.      Impression    IMPRESSION: Bibasilar infiltrates.    ROSALINE DARLING MD   CT Knee Left w/o Contrast    Narrative    CT KNEE LEFT WITHOUT CONTRAST September 17, 2019 6:37 PM     HISTORY: Soft tissue infection suspected, knee, initial exam. Evaluate  for abscess, knee effusion, soft tissue gas.    TECHNIQUE: Radiation dose for this scan was reduced using automated  exposure control, adjustment of the mA and/or kV according to patient  size, or iterative reconstruction technique.    FINDINGS: Suprapatellar recess nonspecific joint effusion is noted.  Subcutaneous edema is noted about the leg extending proximal to the  knee. A small bubble of superficial subcutaneous gas is noted anterior  and distal to the tip of the tibial stump. There may be mild irregular  fluid adjacent to the tip of the stump. Punctate calcifications which  could be dystrophic are also noted in this region. No other evidence  of discrete fluid collection. Metallic artifact related to proximal  tibial screws is noted. There is a well-defined ossicle at the  infrapatellar tendon attachment compatible with Osgood-Schlatter  disease. Meniscal  chondrocalcinosis is noted. No periarticular osseous  destruction or erosion is demonstrated.      Impression    IMPRESSION:  1. Below-knee amputation with irregular faint punctate calcification  or developing ossification at the tip of the tibial stump. There may  be irregular fluid in this region as well. However, no discrete walled  fluid collection is visible on unenhanced CT.  2. Subcutaneous superficial soft tissue gas anterior and distal to the  tibial stump of indeterminate etiology or significance. This could be  related to an overlying wound, soft tissue infection, or recent soft  tissue penetration or procedure.  3. No evidence of tibial stump erosion or osseous destruction.  4. Meniscal chondrocalcinosis and nonspecific knee joint effusion.    GEOVANNY BARRERA MD   XR Chest Port 1 View    Narrative    CHEST PORTABLE ONE VIEW  9/20/2019 8:10 AM     COMPARISON: Frontal chest x-ray 9/17/2019    HISTORY: Followup pneumonia.      Impression    IMPRESSION: Right PICC line has been withdrawn slightly and the tip is  now in the upper superior vena cava. Endotracheal tube has been  removed.    There has been interval worsening of patchy airspace opacity  throughout both lungs worrisome for pneumonia. There is no pleural  effusion or pneumothorax. Heart size remains within normal limits.    JANETTE MARES MD   XR Chest Port 1 View    Narrative    CHEST ONE VIEW PORTABLE   9/20/2019 4:40 PM     HISTORY: Shortness of breath.    COMPARISON: 9/20/2019 at 8:05 AM      Impression    IMPRESSION: Persistent bilateral interstitial and groundglass  opacities suggestive of CHF and/or ARDS. No pneumothorax or pleural  effusion. No lobar consolidation. Heart size similar to prior.  Right-sided PICC line has been removed.    JACQUIE NDIAYE MD   XR Chest Port 1 View    Narrative    XR CHEST PORT 1 VW  9/22/2019 8:30 AM     HISTORY:  PNA, follow up    COMPARISON: Film dated 9/20/2019    FINDINGS:  There has been improvement in  the opacity at the left lung  base since the previous study. There is increased opacity in the right  midlung when compared to the previous film. There are interstitial  opacities in both lungs which may be due to pulmonary edema. The heart  is normal in size.      Impression    IMPRESSION: Improved focal left basilar opacity but increased focal  opacity in the right midlung zone. Focal opacity in the right midlung  could be due to pneumonia. Persistent interstitial opacities. The  interstitial opacities may be due to interstitial edema.    SUMMER FARRELL MD           Most Recent Lab Results In EPIC (For Non-EPIC Providers):      Results for orders placed or performed during the hospital encounter of 09/12/19 (from the past 24 hour(s))   CBC (AM Draw)   Result Value Ref Range    WBC 11.3 (H) 4.0 - 11.0 10e9/L    RBC Count 4.03 (L) 4.4 - 5.9 10e12/L    Hemoglobin 11.7 (L) 13.3 - 17.7 g/dL    Hematocrit 35.9 (L) 40.0 - 53.0 %    MCV 89 78 - 100 fl    MCH 29.0 26.5 - 33.0 pg    MCHC 32.6 31.5 - 36.5 g/dL    RDW 13.9 10.0 - 15.0 %    Platelet Count 359 150 - 450 10e9/L   Comprehensive metabolic panel   Result Value Ref Range    Sodium 141 133 - 144 mmol/L    Potassium 3.2 (L) 3.4 - 5.3 mmol/L    Chloride 102 94 - 109 mmol/L    Carbon Dioxide 34 (H) 20 - 32 mmol/L    Anion Gap 5 3 - 14 mmol/L    Glucose 94 70 - 99 mg/dL    Urea Nitrogen 18 7 - 30 mg/dL    Creatinine 0.84 0.66 - 1.25 mg/dL    GFR Estimate >90 >60 mL/min/[1.73_m2]    GFR Estimate If Black >90 >60 mL/min/[1.73_m2]    Calcium 8.5 8.5 - 10.1 mg/dL    Bilirubin Total 0.3 0.2 - 1.3 mg/dL    Albumin 2.9 (L) 3.4 - 5.0 g/dL    Protein Total 6.4 (L) 6.8 - 8.8 g/dL    Alkaline Phosphatase 112 40 - 150 U/L    ALT 35 0 - 70 U/L    AST 11 0 - 45 U/L   Magnesium (AM Draw)   Result Value Ref Range    Magnesium 2.3 1.6 - 2.3 mg/dL   Phosphorus (AM Draw)   Result Value Ref Range    Phosphorus 3.8 2.5 - 4.5 mg/dL
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
## 2019-09-26 NOTE — PLAN OF CARE
No events overnight. VSS on room air. Incision at left BKA with slight erythema, no drainage. Brace placed after dressing change. Using PO Dilaudid and Tylenol for incision pain; reports adequate pain control. Denies dysnea. Tele: SR with BBB.

## 2019-09-26 NOTE — PLAN OF CARE
Patient discharged at 1:30 PM to home.  IV was discontinued. Belongings returned to patient.  Discharge instructions and medications reviewed with patient.  Patient verbalized understanding and all questions were answered.  Prescriptions given to patient.  At time of discharge, patient condition was stable and left the unit via wheelchair escorted by nursing assistant.

## 2019-09-26 NOTE — PROGRESS NOTES
[facility]    HOSPITALIST PROGRESS NOTE :   --------------------------------------------------    Date of Admission:  9/12/2019    Cumulative Summary: Juancho Mohan is a 48 year old male with past medical history significant for sarcoidosis, sleep apnea, GERD, migraine, essential hypertension who was admitted on September 12 for elective left below-knee amputation by orthopedic surgery for chronic pain after Worker's Comp. injury many years ago.  He was progressing well after surgery, was transitioned to p.o. pain regimen and was planning to be discharged home with home health care.  Patient had a sudden deterioration and on September 16, RRT was called and he was found to be mottled with cool extremities, lethargic with fever, hypertension, tachycardic and hypoxic on supplemental oxygen.  He was complaining of mild dizziness, minimal urine output was also noticed, he was placed on BiPAP and was transferred to ICU for close monitoring and was a started on broad-spectrum antibiotics vancomycin and Zosyn considering sepsis protocol along with IV fluid resuscitation.  CT scan of the head and CT scan of the chest for PE to rule out was negative.  He was extubated on September 17 and was able to weaned off levo fed.  2 out of 2 blood cultures grew Streptococcus mitis from September 16th but since then his blood cultures have been negative.    Assessment & Plan     Active Problems:  Status post elective left below-knee amputation due to chronic pain on September 12 th:  Septic shock, resolved:  Metabolic encephalopathy secondary to sepsis with gram-positive bacteremia, resolved:  Strep bacteremia, 2 out of 2 blood cultures positive with a Streptococcus mitis:    Patient initially did very well after his initial surgery, then had RRT on September 16 requiring initiation of sepsis protocol for likely infection.  CT scan of the chest showed bilateral lower lobe pneumonia, blood cultures were positive  and then he started to grow Streptococcus mittens 2 out of 2.  He was initially encephalopathic, very unresponsive even after IV fluid bolus but has much improved than.  He was able to come off pressors and lactate was normalized.  His WBC count of normal and normal range.  Initially his procalcitonin was as high as 93.5.  Patient was seen and examined this morning, feeling better , we discussed about getting his standing weight as patient had good urine out put but his bodyweight is about the same .  Standing weight is down to 274 lb this am , patient is feeling much better, breathing is improved , wife was also updated on phone while discussion with patient was done   Baseline weight is around 269 lbs    --Continue to monitor patient closely on telemetry.  --Patient has so far received 10 days of IV antibiotics, continue patient on Augmentin po BID for 4 more days to finish 14 day course due to bacteremia   -- Discontinued IV steroids and started patient on oral prednisone ,patient was given script for tapering course of steroid .  -- patient does not need any further diuresis     Acute hypoxic respiratory failure requiring intubation: Resolved, on room air .   On September 16 RRT was called was found to have oxygen saturation of 82 to 83% on 6 L of oxygen, was placed on BiPAP but became unresponsive and required intubation, patient was able to be extubated on September 17 successfully.  Bilateral lower lobe pneumonia  Possible sarcoidosis/asthma exacerbation:    --Echocardiogram was done during this admission which showed ejection fraction of 65 to 70%, RV function is borderline reduced with preserved motion at the base but relative hypokinesis of the mid and distal free wall.  --Continue Breo Ellipta.  --Patient will need outpatient follow-up with pulmonary after discharge.  --oral prednisone 40 mg p.o. daily for 3 days , then will switch to 30 mg for 3 days , then 20 mg for 3 days and then 10 mg for 3 days   --  patient is also diuresed in the last few days for fluid overload and now is almost back to his baseline weight   -- as above he will also finish course of antibiotics    Acute kidney injury: Now resolved,   -- Resume hydrochlorothiazide and lisinopril on discharge     Mild anemia: Hemoglobin is a stable now    Essential hypertension; his home medications include lisinopril/hydrochlorothiazide 20/12.5 mg p.o.twice daily   Hyperlipidemia:    -- resume hydrochlorothiazide/lisinopril and Pravastatin on discharge     Adjustment disorder with anxiety and depression  --PTA medications include Wellbutrin, Lamictal and Lexapro, continue those medications at the current dose inpatient.    Malignant melanoma of right thigh  --Maintained on Keytruda as an outpatient.    History of migraines  --Currently no issues with migraines, uses as needed Imitrex at home.    Constipation  --Continue current bowel regimen.    Diet: Diet  Regular Diet Adult    Fitzgerald Catheter: not present  DVT Prophylaxis: Heparin SQ  Code Status: Full Code    The patient's care was discussed with the Bedside Nurse, Patient and Patient's Family.    Disposition Plan   Expected discharge:  Patient is ok to be discharged from medical point of view , medication reconciliation is completed from medical point of view , antibiotic is sent to the pharmacy , final discharge per Orthopedic surgery     Marcy Lopez MD, FACP  Text Page (7am - 6pm)    ----------------------------------------------------------------------------------------------------------------------      Interval History   Patient was seen and examined ,patient also called his wife on phone , all the questions were answered , denying any chest pain, SOB is improved , weight is down to 274 lbs , almost close to baseline , reviewed plan in detail regarding discharge.    -Data reviewed today: I reviewed all new labs and imaging results over the last 24 hours.    I personally reviewed no images or EKG's  today.    Physical Exam   Temp: 98  F (36.7  C) Temp src: Oral BP: 125/85 Pulse: 85 Heart Rate: 90 Resp: 16 SpO2: 98 % O2 Device: None (Room air)    Vitals:    09/25/19 0558 09/25/19 1055 09/26/19 0535   Weight: 131 kg (288 lb 12.8 oz) 127.4 kg (280 lb 13.9 oz) 124.5 kg (274 lb 7.6 oz)     Vital Signs with Ranges  Temp:  [97.8  F (36.6  C)-98.8  F (37.1  C)] 98  F (36.7  C)  Pulse:  [76-90] 85  Heart Rate:  [80-94] 90  Resp:  [16-18] 16  BP: (120-154)/() 125/85  SpO2:  [92 %-98 %] 98 %  I/O last 3 completed shifts:  In: 500 [P.O.:500]  Out: 6725 [Urine:6725]    GENERAL: Alert , awake and oriented. NAD. Conversational, appropriate.   HEENT: Normocephalic. EOMI. No icterus or injection. Nares normal.   LUNGS: Clear to auscultation except bilateral crackles in the bases. No dyspnea at rest.   HEART: Regular rate. Extremities perfused.   ABDOMEN: Soft, nontender, and nondistended. Positive bowel sounds.   EXTREMITIES: No LE edema noted.   NEUROLOGIC: Moves extremities x4 on command. No acute focal neurologic abnormalities noted.     Medications       buPROPion  75 mg Oral BID     cefTRIAXone  2 g Intravenous Q24H     escitalopram  10 mg Oral QAM     fluticasone-vilanterol  1 puff Inhalation Daily     gabapentin  600 mg Oral TID     heparin  5,000 Units Subcutaneous Q8H     lamoTRIgine  100 mg Oral BID     milnacipran  50 mg Oral BID     pravastatin  10 mg Oral QPM     predniSONE  30 mg Oral Daily     senna-docusate  1 tablet Per NG tube BID    Or     senna-docusate  2 tablet Oral BID       Data   Recent Labs   Lab 09/26/19  0700 09/25/19  0713 09/24/19  0737   WBC 11.3* 9.4 9.5   HGB 11.7* 11.1* 10.8*   MCV 89 89 88    350 323    143 141   POTASSIUM 3.2* 3.4 3.9   CHLORIDE 102 106 104   CO2 34* 34* 32   BUN 18 16 21   CR 0.84 0.88 0.92   ANIONGAP 5 3 5   QUE 8.5 8.5 8.1*   GLC 94 80 84   ALBUMIN 2.9* 2.7* 2.8*   PROTTOTAL 6.4* 5.9* 6.1*   BILITOTAL 0.3 0.3 0.5   ALKPHOS 112 97 94   ALT 35 33 42   AST  11 9 30       Imaging:   No results found for this or any previous visit (from the past 24 hour(s)).

## 2019-09-26 NOTE — PLAN OF CARE
Physical Therapy Discharge Summary    Reason for therapy discharge:    Discharged to home.    Progress towards therapy goal(s). See goals on Care Plan in [facility] electronic health record for goal details.  Goals partially met.  Barriers to achieving goals:   discharge from facility.    Therapy recommendation(s):    Continue home exercise program.  Family assist on stairs

## 2019-09-26 NOTE — DISCHARGE INSTRUCTIONS
Post-Operative Instruction Sheet for Foot and Ankle Surgery  Amauri Mccoy M.D.      These precautions MUST be followed for the first 24 hours after surgery:    Upon discharge, go directly home.    You MUST make arrangements for a responsible adult to stay with you the first night following surgery.  Surgery may be cancelled if you do not have someone that can stay with you.    DO NOT DRINK ALCOHOLIC BEVERAGES.    It is not unusual to feel lightheaded up to 24 hours after surgery or while taking pain medications.  If you feel lightheaded, sit up for a few minutes before standing and have someone assist you when you get up to walk or use the restroom.    Do not use any mechanical equipment or heavy machinery.    Do not make any important or legal decisions for 24 hours or while on pain medication.    You may experience dry mouth, sore throat, or sleep disturbances from the anesthesia and medications used during surgery.  Generalized muscle aches can sometimes occur.  These symptoms generally disappear by 24 hours.    The following are general guidelines and instructions about what to expect the first weeks following surgery.  These are not specific, and your recovery may be slightly different.  Please follow the instructions that are specifically written out for you after the surgery if there are any questions.    Pain Management   If you have received a nerve block, the pain relief can last anywhere from 12-30 hours, this also means you may not have sensation or movement in your foot for that amount of time. You will have pain after the block wears off!  Anticipate this and start pain meds prior to the block wearing off.     Take your first dose of the prescribed pain medication a few hours after you get home, even if you have no pain.     Continue to take your medications as prescribed for the first 24-48 hours - ensure that the patient (you) are alert and have no difficulty breathing before taking the  medication.  Often it may be helpful to set an alarm throughout the night to ensure you don t miss a dose of the medication and wake up with a lot more pain.    You can expect that the first two nights will be the most painful and uncomfortable. I will give you strong medication to make you as comfortable as possible, but you may still have some break-through pain.  This is not unexpected, as there are many nerve endings in the foot and ankle and many patients state the pain from foot surgery is worse than most other injuries or procedures!    After the first few days, take the medication as needed for pain.    As your pain improves, you can gradually decrease your pain meds by utilizing Tylenol or an anti-inflammatory medication.  You should also use these medications as directed early in the post-operative process to supplement the narcotic pain medication.    Dressing   Bleeding through the dressings is quite common.    This usually occurs for the first 1-2 hours after surgery. The actual bleeding has stopped by the time you see the drainage through your dressings.    You can reinforce the outside of the dressing with gauze and an Ace wrap unless otherwise directed.  In most cases, your first dressing change will be performed at your first clinic follow-up visit.  In some cases, I may allow you to change your dressings sooner.  Your dressing should be kept DRY at all times - do not shower, bathe, or wet your dressing in any way after surgery!    Wound Care  Once your stitches are removed, you can shower with soapy water and gently cleanse the incision if it is completely dry.     Do not shower or wash the incision(s) if parts of the incision(s) are open or still draining.    Do not soak the incision(s) in a bathtub or hot tub until the incision(s) is completely dry for one week.    Do not soak the incision(s) in lake or ocean water for at least one month post-operatively.    Elevation   I recommend strict  elevation of your foot above the level of your heart for 4-5 days.  Elevation of the foot remains important up to two weeks after surgery to limit swelling and help wound healing.    Elevate your foot/ankle to at least your waist level but above your heart would be best. The more you elevate your foot and ankle, the less pain you will have.     In the first few days following surgery, restrict the time your foot is  down  to 10 minutes or less at a time.    It is also good to keep your blood flowing through the operated leg and limit the risk of blood clots.  The first day following surgery, I would encourage you to get up and move around the house for a few minutes every hour and then return to elevating the foot.    After the strict elevation period (see above) is completed, you may gradually become more active. You should  listen  to your foot/ankle as to when to get off of it and elevate it again.  This may help even months after surgery.  Remember: avoid anything that hurts or makes your foot/ankle swell!    Icing   Icing can be very useful to decrease the pain and swelling of the foot.     Start by first placing a large garbage bag over the dressing.  Ice may then be placed around the extremity by using either bags of ice taped around the extremity   or you may place the extremity in a bucket filled with ice (the dressing MUST be covered with a plastic bag!).  Bags of frozen peas work well!    You should ice for no more than 20 minutes at a time and repeat at 2 hour intervals.     Do NOT place ice directly on your skin or dressing.     Activity     In most cases (unless otherwise instructed), you may not bear weight on your operated foot/ankle for 2 weeks after surgery.  That means the foot may not touch the ground when upright!  Specific weight bearing instructions for your surgery will be provided on the day of surgery.    Your toes may experience bruising after surgery and become darker when the foot hangs  down.  Unless you had surgery on those toes, it is important to actively wiggle your toes for 5-10 minutes each hour.    Many of your questions can be addressed at your 2-week follow-up appointment - please make a list of things to ask us as they come up during your recovery.    What to watch for      Severe swelling and/or pain in the calf: this could indicate a deep vein thrombosis (blood clot in leg) which requires urgent evaluation and treatment!    Profuse bleeding: that which soaks through your dressing and increases in size throughout the first day after surgery.    Blue or white toes: this indicates a lack of blood flow to the foot.     Fever greater than 101.5: fevers less than this are very common the first few days after surgery and are unlikely to indicate infection or any unexpected problem.    Severe pain: that which does not improve after pain medication, except for the first two nights.   If you have any of the above problems or any concerns, please contact my office ([phone]) and further instructions will be provided.  If you are unable to reach anyone or feel you have a medical emergency, please do not hesitate to go to the nearest urgent care or emergency department.    Medications   *Medications may take up to 24 hours to be refilled by my office.*    All pain medications, along with inactivity following surgery, can cause constipation.  Use the stool softeners as recommended, increase fluid intake to at least 1 quart per day, and increase your dietary fiber.  (The  p  fruits - peaches, plums, pears, and prunes - as well as anise/black licorice are generally helpful.)      Antibiotics may be prescribed to limit the risk of infection only in limited circumstances.  Kelfex (cephalexin) 500 mg - This is an antibiotic given in addition to the antibiotic given during surgery to help reduce the chance of post-operative infection.   Dosage: 1 tablet by mouth 4 times a day.  OR   Cleocin  (clindamycin) 600 mg - This is an antibiotic given to those patients who are allergic to penicillin in addition to the antibiotic given during surgery to help reduce the chance of post-operative infection.   Dosage: 1 tablet by mouth 3 times a day.      Anti-nausea and spasms  Hydroxyzine 25 mg  - This medicine should be taken if you experience any nausea or vomiting. If you know you are sensitive to narcotics please take 1 tablet 30 minutes prior to pain medication. This may also help with any mild itching experienced with the pain medication or muscle spasms.  Dosage: 1 tablet by mouth every 6 hours as needed for nausea, itching, spasms, or adjuvant pain control.      Pain medications (you will only be given a prescription for ONE of the following!)   Ultram (tramadol) 50mg - This is a pain medication that should be taken EVERY 4 TO 6 HOURS for pain relief. You should start the medication when you arrive home after surgery, before the nerve block wears off.  The first 1-2 nights you may need to take 2 tablets every 4 hours.  You should be given enough medication to last to the first office visit.  This medication cannot be refilled over the phone!  Dosage: 1-2 tablets every 4-6 hours as needed for pain relief.  OR  Oxycodone 5mg - This is a pain medication that may be taken EVERY 3 to 4 HOURS for pain relief.  You should start the medication when you arrive home after surgery, before the nerve block wears off.  The first 1-2 nights you may need to take up to 2 or even 3 tablets every 3-4 hours. You should be given enough medication to last to the first office visit.  This medication cannot be refilled over the phone!  Dosage: 1-2 (or 3) tablets every 3-4 hours as needed for pain relief.  OR  Norco (hydrocodone/acetominophen) 5/325 mg - This is a pain medication that should be taken EVERY 4 TO 6 HOURS for pain relief. You should start the medication when you arrive home after surgery, before the nerve block wears  off.  The first 1-2 nights you may need to take 2 tablets every 4 hours.  You should be given enough medication to last to the first office visit.  This medication cannot be refilled over the phone!  Dosage: 1-2 tablets every 4-6 hours as needed for pain relief.  *Do NOT take any Tylenol while taking this medication!  You may alternate Tylenol with this medication provided you do not take greater than 3 grams of Tylenol in total over a 24 hour time period.      Blood thinner  Depending on the type of surgery and your personal risk factors, I may prescribe a medication to help limit the risk of developing a blood clot after your surgery.  The length of time to take the recommended medication will be provided and typically lasts until you are moving about normally or bearing weight on the operated foot/ankle.  ECASA (enteric coated aspirin) 325mg tablet daily.  Lovenox (enoxaparin) 40mg subcutaneous injection daily.  Xarelto 10mg tablet daily.      Stool Softener  Take an over the counter stool softener such as senna or Miralax starting the day after your surgery to prevent constipation. This is a common side effect of the narcotic pain medications.  You may stop taking this after you have regular bowel movements or no longer require use of narcotic pain medications.    Dental Implications  Dental procedures should be avoided until your incisions are healed. Furthermore, surgical procedures including hardware or an allograft require taking an antibiotic within one hour of all dental work within 6 months of surgery. Total ankle replacements require indefinite use of antibiotics prior to dental procedures. We would be happy to provide you with the necessary prescription upon request.    Follow-up Visits  You should have your initial post-operative visits already scheduled but if you do not recall the exact dates or do not believe they have been scheduled, please contact [facility] right away to ensure that we have the  appropriate visits in the system.    You will likely follow-up with my physician assistant for your first post-operative visit and for a few of the additional follow-up visits.  He works directly with me on all patients and will be able to inform me if there are any concerns during your recovery process!        You can often find additional information about your procedure or condition on the [facility] website at [email or web address] or [email or web address].    Additional information from reputable orthopaedic foot and ankle surgeons affiliated with the American Orth[employer]edic Foot and Ankle Society can be found at [email or web address].    Post-operative Foot and Ankle Surgery Instructions and Tips for Pain Control  Dr. Mccoy, [facility]    Non-medication Interventions    Read your post-operative instruction handout!    Elevate the leg at the heart level 95% of the time for the first 2-3 days.    Limit the amount of time the foot and ankle are  down  for no more than 10 minutes at a time the first few days.    Ice consistently for the first 2-3 days.  o If on bare skin or a thin dressing, limit icing to 20 minutes per hour.  o If around a splint, apply the ice behind the knee for 20 minutes per hours or over the splint around the ankle as much as tolerated.    Do not plan extra activity for the first 2-3 days after surgery.    Expect the foot or ankle will be painful - surgery hurts!  The pain will get better.  Trust the process.    Non-opiate Medications  Start these medications right away after you get home from surgery and continue on a regular schedule for at least 3 days.  As you pain allows, start to use as needed until your first clinic visit after surgery.  It may be helpful to alternate the ibuprofen/Celebrex and Tylenol to maximize pain relief.  In rare cases, I may recommend avoiding ibuprofen or aleve after surgery - this will be made clear  at the time of surgery.    Motrin or Advil (ibuprofen) 600mg every 6 hours OR Celebrex 100mg every 12 hours.    Tylenol (acetaminophen) 650mg every 6 hours.      Neurontin (gabapentin) 300mg every 8 hours for the first 3 days only.      Hydroxyzine 25mg (or 10mg if >65 years of age) every 6 hours.    Opiate Medications  These medications should be used sparingly, just as needed, and for the first few days after surgery.  Please see the below guidelines for details.    Ultram (tramadol) 50mg, 1-2 tablets every 4 hours as needed.  OR    Oxycodone 5mg, 1-2 tablets every 3 hours as needed.    Opiate pain medications can be very effective, but carry a number of potentially harmful side effects including tolerance and addiction if used inappropriately.  They will be the most effective the first few days following surgery in the following situations:    If you had a nerve block before surgery, take one pill a few hours after you get home from the surgery center.  Keep to a regular schedule with the medication, taking just one pill every 4 hours until the block wears off (tingling, pins and needles, increased movement in the toes, increased pain, etc.).  Take 1-2 pills again 4 hours later.    Take 1-2 pills at bedtime the first few days after surgery to help sleep and limit pain through the night.    Take 1-2 pills for  rescue  when pain is not controlled by the regularly scheduled medications and non-medication interventions.    You should generally feel less need for the opiate pain medication after the first few days after surgery.  Remember, foot and ankle surgery hurts!  The goal of medication management is to  take the edge off  and keep the pain level tolerable.  Trust the process - the pain will get better!    Multiple studies indicate that opioid pain medication is not needed beyond a few days postoperatively and prolonged use of these medications can actually lead to increased perception of pain and  tolerance/addiction issues.  The CDC and state boards have been recommending and enforcing restrictions on opioid prescribing in light of the significant consequences that arise from chronic opioid use and [facility] is adopting many of these recommendations for your safety and well-being.      1. Lawson et al, Satisfaction with Pain Relief After Operative Treatment of an Ankle Fracture, Injury. 2012; 43(11):1958-61.  2. Lawson et al, Pain Relief After Operative Treatment of an Extremity Fracture, [facility] Am. 2017; 99:1908-15.  3. Christopher et al, Support for Safer Opioid Prescribing Practices, [facility] Am. 2017; 99:1945-55.  4. Geoffrey et al, Liposomal Bupivacaine in Forefoot Surgery, Foot Ankle Int. 2015; 36(5):503-7.  5. Elena et al, Addition of Pregabalin to Multimodal Analgesic Therapy Following Ankle Surgery, Reg Anesth Pain Med. 2012; 37(3):302-7.

## 2019-09-27 LAB
BACTERIA SPEC CULT: ABNORMAL
Lab: ABNORMAL
SPECIMEN SOURCE: ABNORMAL

## 2019-09-29 ENCOUNTER — DOCUMENTATION ONLY (OUTPATIENT)
Dept: CARE COORDINATION | Facility: CLINIC | Age: 49
End: 2019-09-29

## 2019-09-29 NOTE — PROGRESS NOTES
RENETTA med warnings during the med rec:  Multiple medications in the Antidepressants class lexapro/wellbutrin  Multiple medications in the Serotonin Reuptake Inhib. Antidepressants (SSRIs,SNRIs,SDRI) class savella/wellbutrin/lexapro  Multiple medications in the Anticonvulsants class lamictal/gabapentin  Lexapro (escitalopram oxalate) interacts with Zofran (ondansetron HCl)

## 2019-12-08 ENCOUNTER — HEALTH MAINTENANCE LETTER (OUTPATIENT)
Age: 49
End: 2019-12-08

## 2020-03-13 ENCOUNTER — TRANSFERRED RECORDS (OUTPATIENT)
Dept: HEALTH INFORMATION MANAGEMENT | Facility: CLINIC | Age: 50
End: 2020-03-13

## 2021-01-09 ENCOUNTER — HEALTH MAINTENANCE LETTER (OUTPATIENT)
Age: 51
End: 2021-01-09

## 2021-09-03 ENCOUNTER — APPOINTMENT (OUTPATIENT)
Dept: GENERAL RADIOLOGY | Facility: CLINIC | Age: 51
End: 2021-09-03
Attending: EMERGENCY MEDICINE
Payer: COMMERCIAL

## 2021-09-03 ENCOUNTER — HOSPITAL ENCOUNTER (EMERGENCY)
Facility: CLINIC | Age: 51
Discharge: HOME | End: 2021-09-03
Attending: EMERGENCY MEDICINE | Admitting: EMERGENCY MEDICINE
Payer: COMMERCIAL

## 2021-09-03 ENCOUNTER — NURSE TRIAGE (OUTPATIENT)
Dept: NURSING | Facility: CLINIC | Age: 51
End: 2021-09-03

## 2021-09-03 VITALS
RESPIRATION RATE: 16 BRPM | HEART RATE: 78 BPM | DIASTOLIC BLOOD PRESSURE: 80 MMHG | OXYGEN SATURATION: 94 % | SYSTOLIC BLOOD PRESSURE: 113 MMHG | TEMPERATURE: 99.2 F

## 2021-09-03 DIAGNOSIS — U07.1 INFECTION DUE TO 2019 NOVEL CORONAVIRUS: ICD-10-CM

## 2021-09-03 DIAGNOSIS — D86.9 SARCOIDOSIS: ICD-10-CM

## 2021-09-03 LAB
ALBUMIN SERPL-MCNC: 3.8 G/DL (ref 3.4–5)
ALP SERPL-CCNC: 111 U/L (ref 40–150)
ALT SERPL W P-5'-P-CCNC: 43 U/L (ref 0–70)
ANION GAP SERPL CALCULATED.3IONS-SCNC: 5 MMOL/L (ref 3–14)
AST SERPL W P-5'-P-CCNC: 20 U/L (ref 0–45)
BASE EXCESS BLDV CALC-SCNC: 1 MMOL/L (ref -7.7–1.9)
BASOPHILS # BLD AUTO: 0.1 10E3/UL (ref 0–0.2)
BASOPHILS NFR BLD AUTO: 1 %
BILIRUB SERPL-MCNC: 0.5 MG/DL (ref 0.2–1.3)
BUN SERPL-MCNC: 17 MG/DL (ref 7–30)
CALCIUM SERPL-MCNC: 8.1 MG/DL (ref 8.5–10.1)
CHLORIDE BLD-SCNC: 108 MMOL/L (ref 94–109)
CO2 SERPL-SCNC: 24 MMOL/L (ref 20–32)
CREAT SERPL-MCNC: 1.07 MG/DL (ref 0.66–1.25)
CRP SERPL-MCNC: 13.2 MG/L (ref 0–8)
EOSINOPHIL # BLD AUTO: 0.1 10E3/UL (ref 0–0.7)
EOSINOPHIL NFR BLD AUTO: 1 %
ERYTHROCYTE [DISTWIDTH] IN BLOOD BY AUTOMATED COUNT: 12 % (ref 10–15)
FERRITIN SERPL-MCNC: 128 NG/ML (ref 26–388)
GFR SERPL CREATININE-BSD FRML MDRD: 82 ML/MIN/1.73M2
GLUCOSE BLD-MCNC: 107 MG/DL (ref 70–99)
HCO3 BLDV-SCNC: 24 MMOL/L (ref 21–28)
HCT VFR BLD AUTO: 42.1 % (ref 40–53)
HGB BLD-MCNC: 14.6 G/DL (ref 13.3–17.7)
IMM GRANULOCYTES # BLD: 0 10E3/UL
IMM GRANULOCYTES NFR BLD: 0 %
LACTATE SERPL-SCNC: 0.6 MMOL/L (ref 0.7–2)
LDH SERPL L TO P-CCNC: 151 U/L (ref 85–227)
LYMPHOCYTES # BLD AUTO: 0.4 10E3/UL (ref 0.8–5.3)
LYMPHOCYTES NFR BLD AUTO: 7 %
MAGNESIUM SERPL-MCNC: 2.1 MG/DL (ref 1.6–2.3)
MCH RBC QN AUTO: 28.9 PG (ref 26.5–33)
MCHC RBC AUTO-ENTMCNC: 34.7 G/DL (ref 31.5–36.5)
MCV RBC AUTO: 83 FL (ref 78–100)
MONOCYTES # BLD AUTO: 0.9 10E3/UL (ref 0–1.3)
MONOCYTES NFR BLD AUTO: 16 %
NEUTROPHILS # BLD AUTO: 4.2 10E3/UL (ref 1.6–8.3)
NEUTROPHILS NFR BLD AUTO: 75 %
NRBC # BLD AUTO: 0 10E3/UL
NRBC BLD AUTO-RTO: 0 /100
O2/TOTAL GAS SETTING VFR VENT: 21 %
PCO2 BLDV: 34 MM HG (ref 40–50)
PH BLDV: 7.47 [PH] (ref 7.32–7.43)
PLATELET # BLD AUTO: 195 10E3/UL (ref 150–450)
PO2 BLDV: 65 MM HG (ref 25–47)
POTASSIUM BLD-SCNC: 3.9 MMOL/L (ref 3.4–5.3)
PROCALCITONIN SERPL-MCNC: <0.05 NG/ML
PROT SERPL-MCNC: 7.1 G/DL (ref 6.8–8.8)
RBC # BLD AUTO: 5.05 10E6/UL (ref 4.4–5.9)
SARS-COV-2 RNA RESP QL NAA+PROBE: POSITIVE
SODIUM SERPL-SCNC: 137 MMOL/L (ref 133–144)
TROPONIN I SERPL-MCNC: <0.015 UG/L (ref 0–0.04)
WBC # BLD AUTO: 5.6 10E3/UL (ref 4–11)

## 2021-09-03 PROCEDURE — 250N000013 HC RX MED GY IP 250 OP 250 PS 637: Performed by: EMERGENCY MEDICINE

## 2021-09-03 PROCEDURE — 99285 EMERGENCY DEPT VISIT HI MDM: CPT | Mod: 25 | Performed by: EMERGENCY MEDICINE

## 2021-09-03 PROCEDURE — 87635 SARS-COV-2 COVID-19 AMP PRB: CPT | Performed by: EMERGENCY MEDICINE

## 2021-09-03 PROCEDURE — 96361 HYDRATE IV INFUSION ADD-ON: CPT

## 2021-09-03 PROCEDURE — 82803 BLOOD GASES ANY COMBINATION: CPT | Performed by: EMERGENCY MEDICINE

## 2021-09-03 PROCEDURE — 83735 ASSAY OF MAGNESIUM: CPT | Performed by: EMERGENCY MEDICINE

## 2021-09-03 PROCEDURE — 84484 ASSAY OF TROPONIN QUANT: CPT | Performed by: EMERGENCY MEDICINE

## 2021-09-03 PROCEDURE — 85025 COMPLETE CBC W/AUTO DIFF WBC: CPT | Performed by: EMERGENCY MEDICINE

## 2021-09-03 PROCEDURE — 71045 X-RAY EXAM CHEST 1 VIEW: CPT

## 2021-09-03 PROCEDURE — 83615 LACTATE (LD) (LDH) ENZYME: CPT | Performed by: EMERGENCY MEDICINE

## 2021-09-03 PROCEDURE — 82728 ASSAY OF FERRITIN: CPT | Performed by: EMERGENCY MEDICINE

## 2021-09-03 PROCEDURE — 84145 PROCALCITONIN (PCT): CPT | Performed by: EMERGENCY MEDICINE

## 2021-09-03 PROCEDURE — 83605 ASSAY OF LACTIC ACID: CPT | Performed by: EMERGENCY MEDICINE

## 2021-09-03 PROCEDURE — 80053 COMPREHEN METABOLIC PANEL: CPT | Performed by: EMERGENCY MEDICINE

## 2021-09-03 PROCEDURE — 250N000011 HC RX IP 250 OP 636: Performed by: EMERGENCY MEDICINE

## 2021-09-03 PROCEDURE — 36415 COLL VENOUS BLD VENIPUNCTURE: CPT | Performed by: EMERGENCY MEDICINE

## 2021-09-03 PROCEDURE — 96365 THER/PROPH/DIAG IV INF INIT: CPT

## 2021-09-03 PROCEDURE — C9803 HOPD COVID-19 SPEC COLLECT: HCPCS

## 2021-09-03 PROCEDURE — 86140 C-REACTIVE PROTEIN: CPT | Performed by: EMERGENCY MEDICINE

## 2021-09-03 PROCEDURE — 258N000003 HC RX IP 258 OP 636: Performed by: EMERGENCY MEDICINE

## 2021-09-03 PROCEDURE — 96375 TX/PRO/DX INJ NEW DRUG ADDON: CPT

## 2021-09-03 PROCEDURE — 99285 EMERGENCY DEPT VISIT HI MDM: CPT | Mod: 25

## 2021-09-03 PROCEDURE — 93005 ELECTROCARDIOGRAM TRACING: CPT

## 2021-09-03 PROCEDURE — 93010 ELECTROCARDIOGRAM REPORT: CPT | Performed by: EMERGENCY MEDICINE

## 2021-09-03 RX ORDER — ACETAMINOPHEN 325 MG/1
650 TABLET ORAL ONCE
Status: COMPLETED | OUTPATIENT
Start: 2021-09-03 | End: 2021-09-03

## 2021-09-03 RX ORDER — ONDANSETRON 2 MG/ML
4 INJECTION INTRAMUSCULAR; INTRAVENOUS EVERY 30 MIN PRN
Status: DISCONTINUED | OUTPATIENT
Start: 2021-09-03 | End: 2021-09-03 | Stop reason: HOSPADM

## 2021-09-03 RX ORDER — KETOROLAC TROMETHAMINE 15 MG/ML
10 INJECTION, SOLUTION INTRAMUSCULAR; INTRAVENOUS ONCE
Status: COMPLETED | OUTPATIENT
Start: 2021-09-03 | End: 2021-09-03

## 2021-09-03 RX ORDER — DEXAMETHASONE SODIUM PHOSPHATE 10 MG/ML
6 INJECTION, SOLUTION INTRAMUSCULAR; INTRAVENOUS ONCE
Status: COMPLETED | OUTPATIENT
Start: 2021-09-03 | End: 2021-09-03

## 2021-09-03 RX ORDER — ACETAMINOPHEN 650 MG/1
650 SUPPOSITORY RECTAL ONCE
Status: COMPLETED | OUTPATIENT
Start: 2021-09-03 | End: 2021-09-03

## 2021-09-03 RX ORDER — DEXAMETHASONE 6 MG/1
6 TABLET ORAL DAILY
Qty: 5 TABLET | Refills: 0 | Status: SHIPPED | OUTPATIENT
Start: 2021-09-03 | End: 2021-09-08

## 2021-09-03 RX ADMIN — ONDANSETRON 4 MG: 2 INJECTION INTRAMUSCULAR; INTRAVENOUS at 08:20

## 2021-09-03 RX ADMIN — DEXAMETHASONE SODIUM PHOSPHATE 6 MG: 10 INJECTION, SOLUTION INTRAMUSCULAR; INTRAVENOUS at 10:18

## 2021-09-03 RX ADMIN — ACETAMINOPHEN 650 MG: 325 TABLET, FILM COATED ORAL at 07:50

## 2021-09-03 RX ADMIN — KETOROLAC TROMETHAMINE 10 MG: 15 INJECTION, SOLUTION INTRAMUSCULAR; INTRAVENOUS at 10:16

## 2021-09-03 RX ADMIN — SODIUM CHLORIDE, POTASSIUM CHLORIDE, SODIUM LACTATE AND CALCIUM CHLORIDE 500 ML: 600; 310; 30; 20 INJECTION, SOLUTION INTRAVENOUS at 08:15

## 2021-09-03 RX ADMIN — CASIRIVIMAB AND IMDEVIMAB: 600; 600 INJECTION, SOLUTION, CONCENTRATE INTRAVENOUS at 10:28

## 2021-09-03 NOTE — PROGRESS NOTES
Patient has been assessed for Home Oxygen needs.  Oxygen readings:   *   RA - at rest  Pulse oximetry SPO2 88 %  *   RA - during activity/with exercise SPO2 88 %  *   O2 at  2 liters/minute (at rest) ...SPO2 96 %  *   O2 at  2 liters/minute (during activity/with exercise) ...SPO2 97 %    Ray Rogers RT on 9/3/2021 at 11:58 AM

## 2021-09-03 NOTE — PROGRESS NOTES
[facility]  Pt was instructed on how to use equipment.      Ray Rogers, RT on 9/3/2021 at 1:05 PM

## 2021-09-03 NOTE — ED NOTES
Per patient request: his wife was updated at this time. She is concerned about him being discharged to home and having such an extensive medical history. Dr Becerra updated.

## 2021-09-03 NOTE — TELEPHONE ENCOUNTER
Wife is calling and says patient is complaining of fever of 101, headache and rates headache pain 5/10. Patient is also complaining of mild to moderate chest pressure. Patient is unvaccinated and they have recently traveled to [place].   Triage guidelines recommend to go to ED. Caller verbalized and understands directives.  COVID 19 Nurse Triage Plan/Patient Instructions    Please be aware that novel coronavirus (COVID-19) may be circulating in the community. If you develop symptoms such as fever, cough, or SOB or if you have concerns about the presence of another infection including coronavirus (COVID-19), please contact your health care provider or visit [email or web address].     Disposition/Instructions    ED Visit recommended. Follow protocol based instructions.     Bring Your Own Device:  Please also bring your smart device(s) (smart phones, tablets, laptops) and their charging cables for your personal use and to communicate with your care team during your visit.    Thank you for taking steps to prevent the spread of this virus.  o Limit your contact with others.  o Wear a simple mask to cover your cough.  o Wash your hands well and often.    Resources    [facility]: About COVID-19: [email or web address]    CDC: What to Do If You're Sick: www.cdc.gov/coronavirus/2019-ncov/about/steps-when-sick.html    CDC: Ending Home Isolation: www.cdc.gov/coronavirus/2019-ncov/hcp/disposition-in-home-patients.html     CDC: Caring for Someone: www.cdc.gov/coronavirus/2019-ncov/if-you-are-sick/care-for-someone.html     [facility]: Interim Guidance for Hospital Discharge to Home: www.health.[facility].[place]/diseases/coronavirus/hcp/hospdischarge.pdf    [facility] clinical trials (COVID-19 research studies): [email or web address]     Below are the COVID-19 hotlines at the [place] Department of Health ([facility]). Interpreters are available.   o For health questions: Call  [phone] or [phone] (7 a.m. to 7 p.m.)  o For questions about schools and childcare: Call [phone] or 1-181.805.2155 (7 a.m. to 7 p.m.)                     Reason for Disposition    Chest pain or pressure    Patient sounds very sick or weak to the triager    Additional Information    Negative: SEVERE difficulty breathing (e.g., struggling for each breath, speaks in single words)    Negative: Difficult to awaken or acting confused (e.g., disoriented, slurred speech)    Negative: Bluish (or gray) lips or face now    Negative: Shock suspected (e.g., cold/pale/clammy skin, too weak to stand, low BP, rapid pulse)    Negative: Sounds like a life-threatening emergency to the triager    Negative: [1] COVID-19 exposure AND [2] has not completed COVID-19 vaccine series AND [3] no symptoms    Negative: [1] COVID-19 exposure AND [2] completed COVID-19 vaccine series (fully vaccinated) AND [3] no symptoms    Negative: COVID-19 vaccine reaction suspected (e.g., fever, headache, muscle aches) occurring during days 1-3 after getting vaccine    Negative: COVID-19 vaccine, questions about    Negative: [1] COVID-19 vaccine series completed (fully vaccinated) in past 3 months AND [2] new-onset of COVID-19 symptoms BUT [3] no known exposure    Negative: [1] Had lab test confirmed COVID-19 infection within last 3 monthsAND [2] new-onset of COVID-19 symptoms BUT [3] no known exposure    Negative: [1] Lives with someone known to have influenza (flu test positive) AND [2] flu-like symptoms (e.g., cough, runny nose, sore throat, SOB; with or without fever)    Negative: SEVERE or constant chest pain or pressure (Exception: mild central chest pain, present only when coughing)    Negative: MODERATE difficulty breathing (e.g., speaks in phrases, SOB even at rest, pulse 100-120)    Negative: [1] Headache AND [2] stiff neck (can't touch chin to chest)    Negative: MILD difficulty breathing (e.g., minimal/no SOB at rest, SOB with  walking, pulse <100)    Negative: [1] Adult with possible COVID-19 symptoms AND [2] triager concerned about severity of symptoms or other causes    Protocols used: CORONAVIRUS (COVID-19) DIAGNOSED OR [id number] 3.25

## 2021-09-03 NOTE — ED PROVIDER NOTES
History     Chief Complaint   Patient presents with     Covid Concern     HPI  History per patient and medical records    This is a 50-year-old male, history of malignant melanoma on [place], sarcoidosis, RODRIGO on CPAP, left BKA secondary to trauma, hypertension, hyperlipidemia, GERD, migraine presenting with concerns of Covid.  Patient first started feeling ill last night.  He complains of headache, fever, congestion, scratchy throat, chest pressure, fatigue, body aches, nausea.  He has not had any vomiting or diarrhea.  No known sick contacts.  He is not Covid immunized.  Patient does not smoke.  He is not diabetic.    Allergies:  Allergies   Allergen Reactions     Contrast Dye      PN: LW CM1: CONTRAST- nka Reaction :     Lansoprazole      prevacid-arm rash     Steri Strips      blisters     Adhesive Tape Rash     Other Environmental Allergy Rash     Tegaderm- broke out on back and abdomen, but tolerates fine on feet and hands     Silver Nitrate Rash     hives       Problem List:    Patient Active Problem List    Diagnosis Date Noted     Acute kidney failure, unspecified (H) 09/17/2019     Priority: Medium     Below knee amputation status, left 09/12/2019     Priority: Medium     Infection 07/11/2015     Priority: Medium     Cellulitis 07/10/2015     Priority: Medium     Sarcoidosis 09/14/2012     Priority: Medium     Impaired fasting glucose 10/17/2011     Priority: Medium     Migraine 10/10/2011     Priority: Medium     HYPERLIPIDEMIA LDL GOAL <130 10/31/2010     Priority: Medium     Tobacco use disorder 01/05/2010     Priority: Medium     Sleep apnea 06/11/2009     Priority: Medium     Anxiety state 11/09/2007     Priority: Medium     Problem list name updated by automated process. Provider to review       PURE HYPERCHOLESTEROLEM 05/16/2007     Priority: Medium     Impotence of organic origin 03/15/2006     Priority: Medium     Migraine 08/09/2005     Priority: Medium     Problem list name updated by automated  process. Provider to review       Open fracture of calcaneus 05/13/2004     Priority: Medium     Generalized hyperhidrosis 03/26/2004     Priority: Medium     Contact dermatitis and other eczema, due to unspecified cause 03/19/2002     Priority: Medium     Essential hypertension, benign 11/20/2001     Priority: Medium     Reflux esophagitis      Priority: Medium     Congenital spondylolisthesis      Priority: Medium     Obesity      Priority: Medium     Problem list name updated by automated process. Provider to review          Past Medical History:    Past Medical History:   Diagnosis Date     Alcohol abuse, episodic      Attention deficit disorder with hyperactivity(314.01)      Congenital spondylolisthesis      Depressive disorder, not elsewhere classified      Dyspnea on exertion      Essential hypertension, benign      Gastro-oesophageal reflux disease      Generalized anxiety disorder      Migraine, unspecified, with intractable migraine, so stated, without mention of status migrainosus 8/01/05     Pneumonia, organism unspecified(486) 09/21/08     Reflux esophagitis      Renal disease      RSD (reflex sympathetic dystrophy)      Sarcoidoses 6/27/12     Sleep apnea 6/11/2009       Past Surgical History:    Past Surgical History:   Procedure Laterality Date     AMPUTATE LEG BELOW KNEE Left 9/12/2019    Procedure: LEFT BELOW KNEE AMPUTATION;  Surgeon: Amauri Mccoy MD;  Location:  OR     ARTHRODESIS ANKLE  12/31/2013    Procedure: ARTHRODESIS ANKLE;  LEFT SAPHENOUS NERVE BLOCK, LEFT REVISION SUBTALAR FUSION, POSTERIOR BONE BLOCK, TENDOACHILLES LENGTHENING, PLANTAR CALCANEAL EXCOSTECTOMY, LEFT SURAL NEURECTOMY  (FEMORAL HEAD ALLOGRAFT, ALLOSTEM STRIPS, SYNTHES 6.5 MM HEADLESS COMPRESSION SCREWS) ;  Surgeon: Amauri Mccoy MD;  Location:  OR     ARTHROSCOPY ANKLE Left 7/15/2015    Procedure: ARTHROSCOPY ANKLE;  Surgeon: Amauri Mccoy MD;  Location:  OR     BRONCHOSCOPY FLEXIBLE   6/27/2012    Procedure: BRONCHOSCOPY FLEXIBLE;  Flexible Bronchoscopy, Endobronchial Ultrasound with Biopsies;  Surgeon: Justice Dos Santos MD;  Location: [facility] OR     C LUMBAR SPINE FUSION,ANTER APPRCH  12/00     EXCISE EXOSTOSIS FOOT  12/31/2013    Procedure: EXCISE EXOSTOSIS FOOT;  PLANTAR CALCANEOUS EXOSTECOMY;  Surgeon: Amauri Mccoy MD;  Location: [facility] OR     EXERCISE STRESS TEST NL  Jan 2010    normal     GRAFT BONE FROM ILIAC CREST  12/31/2013    Procedure: GRAFT BONE FROM ILIAC CREST;  BONE MARROW  ASPIRATION FROM RIGHTILIAC CREST;  Surgeon: Amauri Mccoy MD;  Location: [facility] OR     HC ARTHROTOMY/EXPLORE/TREAT KNEE JOINT      x3 left; x1 right     HC REMOVAL DEEP IMPLANT  6/16/2009    Left foot. Open wedge osteotomy through the calcaneus. Fusion of left calcaneocuboid joint.     HC REPAIR OF NASAL SEPTUM  12/02/08    [facility]     IRRIGATION AND DEBRIDEMENT FOOT, COMBINED Left 7/15/2015    Procedure: COMBINED IRRIGATION AND DEBRIDEMENT FOOT;  Surgeon: Amauri Mccoy MD;  Location:  OR     IRRIGATION AND DEBRIDEMENT LOWER EXTREMITY, COMBINED Left 7/10/2015    Procedure: COMBINED IRRIGATION AND DEBRIDEMENT LOWER EXTREMITY;  Surgeon: Kirt Godwin MD;  Location:  OR     LENGTHEN TENDON ACHILLES  12/31/2013    Procedure: LENGTHEN TENDON ACHILLES;;  Surgeon: Amauri Mccoy MD;  Location:  OR     ZZC NONSPECIFIC PROCEDURE  2/03    Multiple ortho injuries after 38 foot fall - Fx x 3 right foot, Tibial plateau fx - with ORIF; Left heel fx - plate/screws.       Family History:    Family History   Problem Relation Age of Onset     Diabetes Mother         age 50s     Hypertension Mother      C.A.D. Mother         age 50s     Cerebrovascular Disease Mother      Heart Disease Mother      Obesity Mother      Diabetes Father      Obesity Sister        Social History:  Marital Status:   [2]  Social History     Tobacco Use     Smoking status: Never Smoker      Smokeless tobacco: Current User     Types: Chew     Tobacco comment: Chews 1 can every other day   Substance Use Topics     Alcohol use: No     Comment: No alcohol since 1/03.     Drug use: Not Currently     Frequency: 2.0 times per week     Types: Methamphetamines, Cocaine     Comment: stopped in 03        Medications:    acetaminophen (TYLENOL) 325 MG tablet  dexamethasone (DECADRON) 6 MG tablet  albuterol (PROAIR HFA/PROVENTIL HFA/VENTOLIN HFA) 108 (90 Base) MCG/ACT inhaler  aspirin (ASA) 325 MG tablet  buPROPion (WELLBUTRIN) 75 MG tablet  cyclobenzaprine (FLEXERIL) 10 MG tablet  diphenhydrAMINE (BENADRYL) 25 MG capsule  escitalopram (LEXAPRO) 10 MG tablet  fluticasone-vilanterol (BREO ELLIPTA) 100-25 MCG/INH inhaler  gabapentin (NEURONTIN) 300 MG capsule  HYDROmorphone (DILAUDID) 2 MG tablet  IMITREX 20 MG/ACT nasal spray  lamoTRIgine (LAMICTAL) 200 MG tablet  lisinopril-hydrochlorothiazide (PRINZIDE/ZESTORETIC) 20-12.5 MG tablet  milnacipran (SAVELLA) 50 MG TABS  ondansetron (ZOFRAN-ODT) 8 MG ODT tab  order for DME  order for DME  order for DME  ORDER FOR DME  ORDER FOR DME  polyethylene glycol (MIRALAX/GLYCOLAX) packet  pravastatin (PRAVACHOL) 10 MG tablet  predniSONE (DELTASONE) 10 MG tablet  senna-docusate (SENOKOT-S/PERICOLACE) 8.6-50 MG tablet  sennosides (SENOKOT) 8.6 MG tablet  vitamin C (ASCORBIC ACID) 1000 MG TABS          Review of Systems   All other ROS reviewed and are negative or non-contributory except as stated in HPI.     Physical Exam   BP: 128/78  Pulse: 95  Temp: 100.3  F (37.9  C)  Resp: 20  SpO2: 95 %      Physical Exam  Vitals and nursing note reviewed.   Constitutional:       Appearance: He is obese. He is ill-appearing.      Comments: Ill-appearing male lying in the bed   HENT:      Head: Normocephalic.      Nose: Nose normal.      Mouth/Throat:      Pharynx: Oropharynx is clear.      Comments: Tacky mucous membranes  Eyes:      Extraocular Movements: Extraocular movements intact.       Comments: Bilateral conjunctival injection without discharge   Cardiovascular:      Rate and Rhythm: Regular rhythm. Tachycardia present.      Pulses: Normal pulses.      Heart sounds: Normal heart sounds.   Pulmonary:      Effort: Pulmonary effort is normal.      Breath sounds: Normal breath sounds.   Abdominal:      Palpations: Abdomen is soft.      Tenderness: There is no abdominal tenderness.   Musculoskeletal:         General: No tenderness. Normal range of motion.      Cervical back: Normal range of motion and neck supple.      Right lower leg: No edema.      Left lower leg: No edema.   Skin:     General: Skin is warm and dry.      Findings: No rash.   Neurological:      General: No focal deficit present.      Mental Status: He is alert and oriented to person, place, and time.   Psychiatric:         Mood and Affect: Mood normal.         Behavior: Behavior normal.         ED Course (with Medical Decision Making)    Pt seen and examined by me.  RN and EPIC notes reviewed.      Patient with viral-like illness, highly suspect COVID-19 infection.  He has significant risk factors with his history of sarcoidosis and malignant melanoma.  I am going to check labs, chest x-ray.  Continue to monitor.  O2 if needed.    Patient noted to have intermittent oxygen saturations as low as 88% on room air but they do improve with repositioning and movement.      I certify that this patient, Juancho Mohan has been under my care (or a nurse practitioner or physican's assistant working with me). This is the face-to-face encounter for oxygen medical necessity.      Juancho Mohan is now in a chronic stable state and continues to require supplemental oxygen. Patient has continued oxygen desaturation due to Covid-19 pneumonia + sarcoidosis + RODRIGO.    Alternative treatment(s) tried or considered and deemed clinically infective for treatment of COVID-19 pneumonia/sarcoidosis/RODRIGO include pulmonary toileting.  If portability is  ordered, is the patient mobile within the home? yes    **Patients who qualify for home O2 coverage under the CMS guidelines require ABG tests or O2 sat readings obtained closest to, but no earlier than 2 days prior to the discharge, as evidence of the need for home oxygen therapy. Testing must be performed while patient is in the chronic stable state. See notes for O2 sats.**    Comprehensive metabolic panel is normal.  Lactate is not elevated.  Ferritin and LDH are normal.  His CRP is mildly elevated at 13.2.  CBC normal.  Covid swab is positive for COVID-19.    Chest x-ray shows minimal scattered interstitial changes, nonspecific, no consolidation.    I am concerned about the patient because he does have risk factors but he does not meet actual inpatient criteria at this point.  I am going to give him Regen-Cov and Decadron.    He is complaining about generalized pain, especially in the right shoulder.  He notes that he has chronic right shoulder pain and is supposed to get rotator cuff repair.  He was given a small dose of Toradol for pain.    Despite his intermittent oxygen desaturations and his risk factors, his labs are at this point not abnormal and I do not think he meets actual criteria for admission nor are there any beds available within the system.  However, I have a low threshold for him to return to the ED at anytime for concerns and likely admission.    I have spoken at length with the patient and wife.  He will need close monitoring and if he has any worsening symptoms return at any time.  In the meantime, I am very concerned because of his risk factors and I am sending him home with a pulse oximeter and oxygen to use.  I will set him up with the get well loop.  Discharged with O2 and Decadron.        Procedures       EKG Interpretation:      Interpreted by Thu Becerra MD  Time reviewed: 3685  Symptoms at time of EKG: chest pressure   Rhythm: normal sinus   Rate: 94  Axis: Normal  Ectopy:  Occasional ectopic ventricular beat  Conduction: right bundle branch block (complete)  ST Segments/ T Waves: No ST-T wave changes and No acute ischemic changes  Q Waves: none  Comparison to prior: Previous right bundle branch block    Clinical Impression: no acute changes      Results for orders placed or performed during the hospital encounter of 09/03/21 (from the past 24 hour(s))   XR Chest Port 1 View    Narrative    CHEST ONE VIEW September 3, 2021 7:50 AM     HISTORY: Dyspnea/shortness of breath.    COMPARISON: September 22, 2019.      Impression    IMPRESSION: Minimal scattered interstitial changes which are  nonspecific. No consolidation. No definite effusion.    AL BOX MD         SYSTEM ID:  [id number]   CBC with platelets differential    Narrative    The following orders were created for panel order CBC with platelets differential.  Procedure                               Abnormality         Status                     ---------                               -----------         ------                     CBC with platelets and d...[[id number]]  Abnormal            Final result                 Please view results for these tests on the individual orders.   Comprehensive metabolic panel   Result Value Ref Range    Sodium 137 133 - 144 mmol/L    Potassium 3.9 3.4 - 5.3 mmol/L    Chloride 108 94 - 109 mmol/L    Carbon Dioxide (CO2) 24 20 - 32 mmol/L    Anion Gap 5 3 - 14 mmol/L    Urea Nitrogen 17 7 - 30 mg/dL    Creatinine 1.07 0.66 - 1.25 mg/dL    Calcium 8.1 (L) 8.5 - 10.1 mg/dL    Glucose 107 (H) 70 - 99 mg/dL    Alkaline Phosphatase 111 40 - 150 U/L    AST 20 0 - 45 U/L    ALT 43 0 - 70 U/L    Protein Total 7.1 6.8 - 8.8 g/dL    Albumin 3.8 3.4 - 5.0 g/dL    Bilirubin Total 0.5 0.2 - 1.3 mg/dL    GFR Estimate 82 >60 mL/min/1.73m2   Magnesium   Result Value Ref Range    Magnesium 2.1 1.6 - 2.3 mg/dL   Lactic acid whole blood   Result Value Ref Range    Lactic Acid 0.6 (L) 0.7 - 2.0 mmol/L   Blood gas  venous   Result Value Ref Range    pH Venous 7.47 (H) 7.32 - 7.43    pCO2 Venous 34 (L) 40 - 50 mm Hg    pO2 Venous 65 (H) 25 - 47 mm Hg    Bicarbonate Venous 24 21 - 28 mmol/L    Base Excess/Deficit (+/-) 1.0 -7.7 - 1.9 mmol/L    FIO2 21    Troponin I   Result Value Ref Range    Troponin I <0.015 0.000 - 0.045 ug/L   Lactate Dehydrogenase   Result Value Ref Range    Lactate Dehydrogenase 151 85 - 227 U/L   CRP inflammation   Result Value Ref Range    CRP Inflammation 13.2 (H) 0.0 - 8.0 mg/L   Ferritin   Result Value Ref Range    Ferritin 128 26 - 388 ng/mL   CBC with platelets and differential   Result Value Ref Range    WBC Count 5.6 4.0 - 11.0 10e3/uL    RBC Count 5.05 4.40 - 5.90 10e6/uL    Hemoglobin 14.6 13.3 - 17.7 g/dL    Hematocrit 42.1 40.0 - 53.0 %    MCV 83 78 - 100 fL    MCH 28.9 26.5 - 33.0 pg    MCHC 34.7 31.5 - 36.5 g/dL    RDW 12.0 10.0 - 15.0 %    Platelet Count 195 150 - 450 10e3/uL    % Neutrophils 75 %    % Lymphocytes 7 %    % Monocytes 16 %    % Eosinophils 1 %    % Basophils 1 %    % Immature Granulocytes 0 %    NRBCs per 100 WBC 0 <1 /100    Absolute Neutrophils 4.2 1.6 - 8.3 10e3/uL    Absolute Lymphocytes 0.4 (L) 0.8 - 5.3 10e3/uL    Absolute Monocytes 0.9 0.0 - 1.3 10e3/uL    Absolute Eosinophils 0.1 0.0 - 0.7 10e3/uL    Absolute Basophils 0.1 0.0 - 0.2 10e3/uL    Absolute Immature Granulocytes 0.0 <=0.0 10e3/uL    Absolute NRBCs 0.0 10e3/uL   Symptomatic COVID-19 Virus (Coronavirus) by PCR Nasopharyngeal    Specimen: Nasopharyngeal; Swab   Result Value Ref Range    SARS CoV2 PCR Positive (A) Negative    Narrative    Testing was performed using the dl  SARS-CoV-2 & Influenza A/B Assay on the dl  [employer]  System.  This test should be ordered for the detection of SARS-COV-2 in individuals who meet SARS-CoV-2 clinical and/or epidemiological criteria. Test performance is unknown in asymptomatic patients.  This test is for in vitro diagnostic use under the FDA EUA for laboratories certified  under CLIA to perform moderate and/or high complexity testing. This test has not been FDA cleared or approved.  A negative test does not rule out the presence of PCR inhibitors in the specimen or target RNA in concentration below the limit of detection for the assay. The possibility of a false negative should be considered if the patient's recent exposure or clinical presentation suggests COVID-19.  [facility] are certified under the Clinical Laboratory Improvement Amendments of 1988 (CLIA-88) as qualified to perform moderate and/or high complexity laboratory testing.       Medications   sodium chloride (PF) 0.9% PF flush 3 mL (has no administration in time range)   ondansetron (ZOFRAN) injection 4 mg (4 mg Intravenous Given 9/3/21 0820)   lactated ringers BOLUS 500 mL (0 mLs Intravenous Stopped 9/3/21 0852)   acetaminophen (TYLENOL) tablet 650 mg (650 mg Oral Given 9/3/21 0750)     Or   acetaminophen (TYLENOL) Suppository 650 mg ( Rectal See Alternative 9/3/21 0750)   casirivimab-imdevimab (REGEN-COV) 1,200 mg in sodium chloride 0.9 % 60 mL intermittent infusion ( Intravenous Stopped 9/3/21 1100)   dexamethasone PF (DECADRON) injection 6 mg (6 mg Intravenous Given 9/3/21 1018)   ketorolac (TORADOL) injection 10 mg (10 mg Intravenous Given 9/3/21 1016)       Assessments & Plan (with Medical Decision Making)   I have reviewed the findings, diagnosis, plan and need for follow up with the patient.    New Prescriptions    DEXAMETHASONE (DECADRON) 6 MG TABLET    Take 1 tablet (6 mg) by mouth daily for 5 days       Final diagnoses:   Infection due to 2019 novel coronavirus   Sarcoidosis     Disposition: Patient discharged home in stable condition.  Plan as above.  Return for concerns.     Note: Chart documentation done in part with Dragon Voice Recognition software. Although reviewed after completion, some word and grammatical errors may remain.       9/3/2021   [facility]  DEPT     Thu Becerra MD  09/03/21 0749

## 2021-09-03 NOTE — DISCHARGE INSTRUCTIONS
"Drink plenty fluids and get lots of rest.    Monitor your oxygen saturations.  If they are consistently at or below 88% despite being on the oxygen please return to the ED.  It is also recommended that you try to change positions while resting including lying on your stomach to help aerate all parts of your lungs and keep your oxygen saturations within normal limits.    You were given a dose of dexamethasone to decrease inflammation in the ED.  Continue for 5 more days.  Take with food or milk.    You were also given a dose of monoclonal antibody to fight against the infection.    Okay to use Tylenol or ibuprofen if needed for fevers or pain.    Return promptly to the ED at anytime for concerns.    I hope that you start to feel much better quickly!!!        Discharge Instructions for COVID-19 Patients  You have--or may have--COVID-19. Please follow the instructions listed below.   If you have a weakened immune system, discuss with your doctor any other actions you need to take.  How can I protect others?  If you have symptoms (fever, cough, body aches or trouble breathing):  Stay home and away from others (self-isolate) until:  Your other symptoms have resolved (gotten better). And   You've had no fever--and no medicine that reduces fever--for 1 full day (24 hours). And   At least 10 days have passed since your symptoms started. (You may need to wait 20 days. Follow the advice of your care team.)  If you don't show symptoms, but testing showed that you have COVID-19:  Stay home and away from others (self-isolate) until at least 10 days have passed since the date of your first positive COVID-19 test.  During this time  Stay in your own room, even for meals. Use your own bathroom if you can.  Stay away from others in your home. No hugging, kissing or shaking hands. No visitors.  Don't go to work, school or anywhere else.  Clean \"high touch\" surfaces often (doorknobs, counters, handles). Use household cleaning spray or " wipes.  You'll find a full list of  on the EPA website: www.epa.gov/pesticide-registration/list-n-disinfectants-use-against-sars-cov-2.  Cover your mouth and nose with a mask or other face covering to avoid spreading germs.  Wash your hands and face often. Use soap and water.  Caregivers in these groups are at risk for severe illness due to COVID-19:  People 65 years and older  People who live in a nursing home or long-term care facility  People with chronic disease (lung, heart, cancer, diabetes, kidney, liver, immunologic)  People who have a weakened immune system, including those who:  Are in cancer treatment  Take medicine that weakens the immune system, such as corticosteroids  Had a bone marrow or organ transplant  Have an immune deficiency  Have poorly controlled HIV or AIDS  Are obese (body mass index of 40 or higher)  Smoke regularly  Caregivers should wear gloves while washing dishes, handling laundry and cleaning bedrooms and bathrooms.  Use caution when washing and drying laundry: Don't shake dirty laundry and use the warmest water setting that you can.  For more tips on managing your health at home, go to www.cdc.gov/coronavirus/2019-ncov/downloads/10Things.pdf.  How can I take care of myself at home?  Get lots of rest. Drink extra fluids (unless a doctor has told you not to).  Take Tylenol (acetaminophen) for fever or pain. If you have liver or kidney problems, ask your family doctor if it's okay to take Tylenol.   Adults can take either:   650 mg (two 325 mg pills) every 4 to 6 hours, or   1,000 mg (two 500 mg pills) every 8 hours as needed.  Note: Don't take more than 3,000 mg in one day. Acetaminophen is found in many medicines (both prescribed and over-the-counter medicines). Read all labels to be sure you don't take too much.   For children, check the Tylenol bottle for the right dose. The dose is based on the child's age or weight.  If you have other health problems (like cancer, heart  failure, an organ transplant or severe kidney disease): Call your specialty clinic if you don't feel better in the next 2 days.  Know when to call 911. Emergency warning signs include:  Trouble breathing or shortness of breath  Pain or pressure in the chest that doesn't go away  Feeling confused like you haven't felt before, or not being able to wake up  Bluish-colored lips or face  Your doctor may have prescribed a blood thinner medicine. Follow their instructions.  Where can I get more information?  [facility] - About COVID-19:   [email or web address]  CDC - What to Do If You're Sick: www.cdc.gov/coronavirus/2019-ncov/about/steps-when-sick.html  CDC - Ending Home Isolation: www.cdc.gov/coronavirus/2019-ncov/hcp/disposition-in-home-patients.html  CDC - Caring for Someone: www.cdc.gov/coronavirus/2019-ncov/if-you-are-sick/care-for-someone.html  [facility] - Interim Guidance for Hospital Discharge to Home: www.health.[facility].[place]./diseases/coronavirus/hcp/hospdischarge.pdf  Below are the COVID-19 hotlines at the [place] Department of Health ([facility]). Interpreters are available.  For health questions: Call 956-482-2432 or 1-227.289.8064 (7 a.m. to 7 p.m.)  For questions about schools and childcare: Call 326-590-3996 or 1-822.245.7028 (7 a.m. to 7 p.m.)    For informational purposes only. Not to replace the advice of your health care provider. Clinically reviewed by Dr. Kota Rodgers.   Copyright   2020 [employer]. All rights reserved. [employer] [id number] - REV 01/05/21.

## 2021-09-07 ENCOUNTER — DOCUMENTATION ONLY (OUTPATIENT)
Dept: SLEEP MEDICINE | Facility: CLINIC | Age: 51
End: 2021-09-07

## 2021-09-07 NOTE — PROGRESS NOTES
Received intake for oxygen setup. All documentation received and can be setup on O2 by [facility]. RT Ray delivered poc to pts bedside since pt is Covid positive. Pt was setup prior to discharging

## 2021-10-23 ENCOUNTER — HEALTH MAINTENANCE LETTER (OUTPATIENT)
Age: 51
End: 2021-10-23

## 2022-02-12 ENCOUNTER — HEALTH MAINTENANCE LETTER (OUTPATIENT)
Age: 52
End: 2022-02-12

## 2022-10-09 ENCOUNTER — HEALTH MAINTENANCE LETTER (OUTPATIENT)
Age: 52
End: 2022-10-09

## 2023-05-21 ENCOUNTER — HEALTH MAINTENANCE LETTER (OUTPATIENT)
Age: 53
End: 2023-05-21

## 2024-05-25 ENCOUNTER — HEALTH MAINTENANCE LETTER (OUTPATIENT)
Age: 54
End: 2024-05-25

## (undated) DEVICE — LINEN TOWEL PACK X5 5464

## (undated) DEVICE — MANIFOLD NEPTUNE 4 PORT 700-20

## (undated) DEVICE — GLOVE PROTEXIS POWDER FREE 7.5 ORTHOPEDIC 2D73ET75

## (undated) DEVICE — SU SILK 2-0 SH 30" K833H

## (undated) DEVICE — SU ETHIBOND 2 V-37 4X30" MX69G

## (undated) DEVICE — NDL 22GA 1.5"

## (undated) DEVICE — CAST PADDING 4" STERILE 9044S

## (undated) DEVICE — PACK EXTREMITY SOP15EXFSD

## (undated) DEVICE — CAST PADDING 4" UNSTERILE 9044

## (undated) DEVICE — SU MONOCRYL 3-0 PS-2 27" Y427H

## (undated) DEVICE — GLOVE PROTEXIS W/NEU-THERA 7.5  2D73TE75

## (undated) DEVICE — KIT SURGICAL TURNOVER FVSD-01D

## (undated) DEVICE — SU SILK 2-0 TIE 24" SA75H

## (undated) DEVICE — DRAPE SHEET REV FOLD 3/4 9349

## (undated) DEVICE — SOL NACL 0.9% IRRIG 1000ML BOTTLE 2F7124

## (undated) DEVICE — DRSG XEROFORM 5X9" 8884431605

## (undated) DEVICE — CAST PLASTER SPLINT 5X30" 7395

## (undated) DEVICE — SU VICRYL 2-0 CT-1 27" UND J259H

## (undated) DEVICE — SU ETHILON 3-0 PS-1 18" 1663G

## (undated) DEVICE — SU ETHIBOND 0 CT-2 30" X412H

## (undated) DEVICE — GLOVE PROTEXIS POWDER FREE 8.0 ORTHOPEDIC 2D73ET80

## (undated) DEVICE — SYR 10ML FINGER CONTROL W/O NDL 309695

## (undated) DEVICE — ESU GROUND PAD UNIVERSAL W/O CORD

## (undated) DEVICE — DRSG KERLIX 4 1/2"X4YDS ROLL 6715

## (undated) DEVICE — DRAPE STERI U 1015

## (undated) DEVICE — SOL WATER IRRIG 1000ML BOTTLE 2F7114

## (undated) DEVICE — DRAPE C-ARM 60X42" 1013

## (undated) DEVICE — GLOVE PROTEXIS BLUE W/NEU-THERA 8.0  2D73EB80

## (undated) DEVICE — DRSG KERLIX FLUFFS X5

## (undated) RX ORDER — PROPOFOL 10 MG/ML
INJECTION, EMULSION INTRAVENOUS
Status: DISPENSED
Start: 2019-09-12

## (undated) RX ORDER — CEFAZOLIN SODIUM 2 G/100ML
INJECTION, SOLUTION INTRAVENOUS
Status: DISPENSED
Start: 2019-09-12

## (undated) RX ORDER — KETAMINE HCL IN NACL, ISO-OSM 100MG/10ML
SYRINGE (ML) INJECTION
Status: DISPENSED
Start: 2019-09-12

## (undated) RX ORDER — LIDOCAINE HYDROCHLORIDE 10 MG/ML
INJECTION, SOLUTION EPIDURAL; INFILTRATION; INTRACAUDAL; PERINEURAL
Status: DISPENSED
Start: 2019-09-12

## (undated) RX ORDER — BUPIVACAINE HYDROCHLORIDE 5 MG/ML
INJECTION, SOLUTION EPIDURAL; INTRACAUDAL
Status: DISPENSED
Start: 2019-09-12

## (undated) RX ORDER — CEFAZOLIN SODIUM IN 0.9 % NACL 3 G/100 ML
INTRAVENOUS SOLUTION, PIGGYBACK (ML) INTRAVENOUS
Status: DISPENSED
Start: 2019-09-12

## (undated) RX ORDER — CEFAZOLIN SODIUM 1 G/3ML
INJECTION, POWDER, FOR SOLUTION INTRAMUSCULAR; INTRAVENOUS
Status: DISPENSED
Start: 2019-09-12

## (undated) RX ORDER — FENTANYL CITRATE 50 UG/ML
INJECTION, SOLUTION INTRAMUSCULAR; INTRAVENOUS
Status: DISPENSED
Start: 2019-09-12

## (undated) RX ORDER — LIDOCAINE HYDROCHLORIDE 20 MG/ML
INJECTION, SOLUTION EPIDURAL; INFILTRATION; INTRACAUDAL; PERINEURAL
Status: DISPENSED
Start: 2019-09-12

## (undated) RX ORDER — ACETAMINOPHEN 325 MG/1
TABLET ORAL
Status: DISPENSED
Start: 2019-09-12